# Patient Record
Sex: MALE | Race: WHITE | Employment: PART TIME | URBAN - METROPOLITAN AREA
[De-identification: names, ages, dates, MRNs, and addresses within clinical notes are randomized per-mention and may not be internally consistent; named-entity substitution may affect disease eponyms.]

---

## 2017-01-18 RX ORDER — VENLAFAXINE 75 MG/1
TABLET ORAL
Qty: 90 TABLET | Refills: 3 | Status: SHIPPED | OUTPATIENT
Start: 2017-01-18 | End: 2018-07-07 | Stop reason: SDUPTHER

## 2017-03-20 RX ORDER — ALLOPURINOL 100 MG/1
TABLET ORAL
Qty: 90 TABLET | Refills: 3 | Status: SHIPPED | OUTPATIENT
Start: 2017-03-20 | End: 2018-03-18 | Stop reason: SDUPTHER

## 2017-04-20 ENCOUNTER — OFFICE VISIT (OUTPATIENT)
Dept: INTERNAL MEDICINE CLINIC | Age: 67
End: 2017-04-20

## 2017-04-20 VITALS
BODY MASS INDEX: 28.64 KG/M2 | SYSTOLIC BLOOD PRESSURE: 156 MMHG | DIASTOLIC BLOOD PRESSURE: 70 MMHG | HEART RATE: 64 BPM | WEIGHT: 189 LBS | HEIGHT: 68 IN

## 2017-04-20 DIAGNOSIS — I10 ESSENTIAL HYPERTENSION, BENIGN: Primary | ICD-10-CM

## 2017-04-20 PROCEDURE — 4040F PNEUMOC VAC/ADMIN/RCVD: CPT | Performed by: INTERNAL MEDICINE

## 2017-04-20 PROCEDURE — 1123F ACP DISCUSS/DSCN MKR DOCD: CPT | Performed by: INTERNAL MEDICINE

## 2017-04-20 PROCEDURE — G8428 CUR MEDS NOT DOCUMENT: HCPCS | Performed by: INTERNAL MEDICINE

## 2017-04-20 PROCEDURE — 1036F TOBACCO NON-USER: CPT | Performed by: INTERNAL MEDICINE

## 2017-04-20 PROCEDURE — 99213 OFFICE O/P EST LOW 20 MIN: CPT | Performed by: INTERNAL MEDICINE

## 2017-04-20 PROCEDURE — G8420 CALC BMI NORM PARAMETERS: HCPCS | Performed by: INTERNAL MEDICINE

## 2017-04-20 PROCEDURE — 3017F COLORECTAL CA SCREEN DOC REV: CPT | Performed by: INTERNAL MEDICINE

## 2017-04-20 RX ORDER — ATENOLOL 25 MG/1
25 TABLET ORAL DAILY
Qty: 90 TABLET | Refills: 3 | Status: SHIPPED | OUTPATIENT
Start: 2017-04-20 | End: 2018-04-15 | Stop reason: SDUPTHER

## 2017-04-20 ASSESSMENT — PATIENT HEALTH QUESTIONNAIRE - PHQ9
SUM OF ALL RESPONSES TO PHQ9 QUESTIONS 1 & 2: 0
2. FEELING DOWN, DEPRESSED OR HOPELESS: 0
SUM OF ALL RESPONSES TO PHQ QUESTIONS 1-9: 0
1. LITTLE INTEREST OR PLEASURE IN DOING THINGS: 0

## 2017-05-24 RX ORDER — LISINOPRIL 40 MG/1
TABLET ORAL
Qty: 90 TABLET | Refills: 3 | Status: SHIPPED | OUTPATIENT
Start: 2017-05-24 | End: 2017-08-01 | Stop reason: SDUPTHER

## 2017-07-05 RX ORDER — SIMVASTATIN 40 MG
TABLET ORAL
Qty: 90 TABLET | Refills: 3 | Status: SHIPPED | OUTPATIENT
Start: 2017-07-05 | End: 2017-10-23 | Stop reason: ALTCHOICE

## 2017-08-01 RX ORDER — LISINOPRIL 40 MG/1
40 TABLET ORAL DAILY
Qty: 90 TABLET | Refills: 3 | Status: SHIPPED | OUTPATIENT
Start: 2017-08-01 | End: 2018-02-13 | Stop reason: SDUPTHER

## 2017-09-08 ENCOUNTER — HOSPITAL ENCOUNTER (OUTPATIENT)
Dept: CT IMAGING | Age: 67
Discharge: HOME | End: 2017-09-08
Attending: INTERNAL MEDICINE | Admitting: INTERNAL MEDICINE

## 2017-09-08 DIAGNOSIS — D48.1 NEOPLASM OF UNCERTAIN BEHAVIOR OF CONNECTIVE AND OTHER SOFT TISSUE: ICD-10-CM

## 2017-09-08 LAB
BUN BLDV-MCNC: 17 MG/DL (ref 7–20)
CREAT SERPL-MCNC: 1.2 MG/DL (ref 0.8–1.3)
GFR AFRICAN AMERICAN: >60
GFR NON-AFRICAN AMERICAN: >60

## 2017-10-19 ENCOUNTER — OFFICE VISIT (OUTPATIENT)
Dept: INTERNAL MEDICINE CLINIC | Age: 67
End: 2017-10-19

## 2017-10-19 VITALS
SYSTOLIC BLOOD PRESSURE: 142 MMHG | WEIGHT: 187 LBS | HEIGHT: 69 IN | DIASTOLIC BLOOD PRESSURE: 80 MMHG | BODY MASS INDEX: 27.7 KG/M2 | HEART RATE: 60 BPM

## 2017-10-19 DIAGNOSIS — Z00.00 ROUTINE GENERAL MEDICAL EXAMINATION AT A HEALTH CARE FACILITY: ICD-10-CM

## 2017-10-19 DIAGNOSIS — I10 ESSENTIAL HYPERTENSION, BENIGN: Primary | ICD-10-CM

## 2017-10-19 DIAGNOSIS — Z12.5 PROSTATE CANCER SCREENING: ICD-10-CM

## 2017-10-19 DIAGNOSIS — R73.9 HYPERGLYCEMIA: ICD-10-CM

## 2017-10-19 LAB
A/G RATIO: 1.4 (ref 1.1–2.2)
ALBUMIN SERPL-MCNC: 4.4 G/DL (ref 3.4–5)
ALP BLD-CCNC: 89 U/L (ref 40–129)
ALT SERPL-CCNC: 31 U/L (ref 10–40)
ANION GAP SERPL CALCULATED.3IONS-SCNC: 13 MMOL/L (ref 3–16)
AST SERPL-CCNC: 20 U/L (ref 15–37)
BILIRUB SERPL-MCNC: 0.6 MG/DL (ref 0–1)
BUN BLDV-MCNC: 17 MG/DL (ref 7–20)
CALCIUM SERPL-MCNC: 9.9 MG/DL (ref 8.3–10.6)
CHLORIDE BLD-SCNC: 100 MMOL/L (ref 99–110)
CHOLESTEROL, TOTAL: 189 MG/DL (ref 0–199)
CO2: 30 MMOL/L (ref 21–32)
CREAT SERPL-MCNC: 1.1 MG/DL (ref 0.8–1.3)
GFR AFRICAN AMERICAN: >60
GFR NON-AFRICAN AMERICAN: >60
GLOBULIN: 3.1 G/DL
GLUCOSE BLD-MCNC: 100 MG/DL (ref 70–99)
HDLC SERPL-MCNC: 38 MG/DL (ref 40–60)
LDL CHOLESTEROL CALCULATED: ABNORMAL MG/DL
LDL CHOLESTEROL DIRECT: 76 MG/DL
POTASSIUM SERPL-SCNC: 4.4 MMOL/L (ref 3.5–5.1)
PROSTATE SPECIFIC ANTIGEN: 0.98 NG/ML (ref 0–4)
SODIUM BLD-SCNC: 143 MMOL/L (ref 136–145)
TOTAL PROTEIN: 7.5 G/DL (ref 6.4–8.2)
TRIGL SERPL-MCNC: 513 MG/DL (ref 0–150)
TSH REFLEX FT4: 2.25 UIU/ML (ref 0.27–4.2)
VLDLC SERPL CALC-MCNC: ABNORMAL MG/DL

## 2017-10-19 PROCEDURE — 99215 OFFICE O/P EST HI 40 MIN: CPT | Performed by: INTERNAL MEDICINE

## 2017-10-19 PROCEDURE — 3017F COLORECTAL CA SCREEN DOC REV: CPT | Performed by: INTERNAL MEDICINE

## 2017-10-19 PROCEDURE — G8427 DOCREV CUR MEDS BY ELIG CLIN: HCPCS | Performed by: INTERNAL MEDICINE

## 2017-10-19 PROCEDURE — 1036F TOBACCO NON-USER: CPT | Performed by: INTERNAL MEDICINE

## 2017-10-19 PROCEDURE — G8417 CALC BMI ABV UP PARAM F/U: HCPCS | Performed by: INTERNAL MEDICINE

## 2017-10-19 PROCEDURE — G8484 FLU IMMUNIZE NO ADMIN: HCPCS | Performed by: INTERNAL MEDICINE

## 2017-10-19 PROCEDURE — 4040F PNEUMOC VAC/ADMIN/RCVD: CPT | Performed by: INTERNAL MEDICINE

## 2017-10-19 PROCEDURE — 1123F ACP DISCUSS/DSCN MKR DOCD: CPT | Performed by: INTERNAL MEDICINE

## 2017-10-19 NOTE — PROGRESS NOTES
Chief Complaint   Patient presents with    Annual Exam       Xiang Hunter 78 y.o. male is here for   hypertension, Hyperlipidemia, status post colon resection in for comprehensive physical examination. He recently had a CT scan done in follow-up of the colon resection. He had some changes of fatty liver, prostatic hypertrophy and some thickening of the bladder wall. Examination is otherwise unremarkable. He has no personal history of diabetes but his father was diabetic in his later years. He does not have polydipsia polyuria or polyphagia. The patient denies   symptoms suggesting end organ damage from their hypertension. There has been compliance to medications      Current Outpatient Prescriptions on File Prior to Visit   Medication Sig Dispense Refill    lisinopril (PRINIVIL;ZESTRIL) 40 MG tablet Take 1 tablet by mouth daily 90 tablet 3    simvastatin (ZOCOR) 40 MG tablet TAKE ONE TABLET BY MOUTH EVERY NIGHT AT BEDTIME 90 tablet 3    atenolol (TENORMIN) 25 MG tablet Take 1 tablet by mouth daily 90 tablet 3    allopurinol (ZYLOPRIM) 100 MG tablet TAKE ONE TABLET BY MOUTH DAILY 90 tablet 3    venlafaxine (EFFEXOR) 75 MG tablet TAKE ONE TABLET BY MOUTH DAILY 90 tablet 3    Fish Oil-Cholecalciferol (FISH OIL + D3 PO) Take by mouth daily      aspirin 81 MG EC tablet Take 81 mg by mouth daily. No current facility-administered medications on file prior to visit. Past Medical History:   Diagnosis Date    Hyperlipidemia     Hypertension     Sleep apnea     uses c-pap     Review of systems does have some urinary hesitancy otherwise 10 system review negative      BP (!) 142/80   Pulse 60   Ht 5' 8.5\" (1.74 m)   Wt 187 lb (84.8 kg)   BMI 28.02 kg/m²   Physical Exam   Constitutional: He appears well-developed and well-nourished. HENT:   Head: Normocephalic and atraumatic.    Right Ear: External ear normal.   Left Ear: External ear normal.   Eyes: Conjunctivae are normal. No

## 2017-10-20 LAB
ESTIMATED AVERAGE GLUCOSE: 128.4 MG/DL
HBA1C MFR BLD: 6.1 %

## 2017-10-23 RX ORDER — ATORVASTATIN CALCIUM 40 MG/1
40 TABLET, FILM COATED ORAL DAILY
Qty: 90 TABLET | Refills: 3 | Status: SHIPPED | OUTPATIENT
Start: 2017-10-23 | End: 2018-12-06 | Stop reason: SDUPTHER

## 2018-02-13 DIAGNOSIS — I10 ESSENTIAL HYPERTENSION, BENIGN: Primary | ICD-10-CM

## 2018-02-13 RX ORDER — LISINOPRIL 40 MG/1
40 TABLET ORAL DAILY
Qty: 90 TABLET | Refills: 3 | Status: SHIPPED | OUTPATIENT
Start: 2018-02-13 | End: 2019-02-13 | Stop reason: SDUPTHER

## 2018-02-22 ENCOUNTER — OFFICE VISIT (OUTPATIENT)
Dept: INTERNAL MEDICINE CLINIC | Age: 68
End: 2018-02-22

## 2018-02-22 ENCOUNTER — TELEPHONE (OUTPATIENT)
Dept: INTERNAL MEDICINE CLINIC | Age: 68
End: 2018-02-22

## 2018-02-22 VITALS
HEART RATE: 112 BPM | OXYGEN SATURATION: 98 % | BODY MASS INDEX: 28.77 KG/M2 | DIASTOLIC BLOOD PRESSURE: 90 MMHG | WEIGHT: 192 LBS | SYSTOLIC BLOOD PRESSURE: 170 MMHG

## 2018-02-22 DIAGNOSIS — J10.1 INFLUENZA A: Primary | ICD-10-CM

## 2018-02-22 LAB
INFLUENZA A ANTIBODY: NORMAL
INFLUENZA B ANTIBODY: NORMAL

## 2018-02-22 PROCEDURE — 99213 OFFICE O/P EST LOW 20 MIN: CPT | Performed by: FAMILY MEDICINE

## 2018-02-22 PROCEDURE — G8417 CALC BMI ABV UP PARAM F/U: HCPCS | Performed by: FAMILY MEDICINE

## 2018-02-22 PROCEDURE — 1123F ACP DISCUSS/DSCN MKR DOCD: CPT | Performed by: FAMILY MEDICINE

## 2018-02-22 PROCEDURE — G8427 DOCREV CUR MEDS BY ELIG CLIN: HCPCS | Performed by: FAMILY MEDICINE

## 2018-02-22 PROCEDURE — 4040F PNEUMOC VAC/ADMIN/RCVD: CPT | Performed by: FAMILY MEDICINE

## 2018-02-22 PROCEDURE — 1036F TOBACCO NON-USER: CPT | Performed by: FAMILY MEDICINE

## 2018-02-22 PROCEDURE — G8484 FLU IMMUNIZE NO ADMIN: HCPCS | Performed by: FAMILY MEDICINE

## 2018-02-22 PROCEDURE — 3017F COLORECTAL CA SCREEN DOC REV: CPT | Performed by: FAMILY MEDICINE

## 2018-02-22 PROCEDURE — 87804 INFLUENZA ASSAY W/OPTIC: CPT | Performed by: FAMILY MEDICINE

## 2018-02-22 RX ORDER — OSELTAMIVIR PHOSPHATE 75 MG/1
75 CAPSULE ORAL 2 TIMES DAILY
Qty: 10 CAPSULE | Refills: 0 | Status: SHIPPED | OUTPATIENT
Start: 2018-02-22 | End: 2018-02-27

## 2018-02-22 RX ORDER — BENZONATATE 100 MG/1
100 CAPSULE ORAL EVERY 6 HOURS PRN
Qty: 30 CAPSULE | Refills: 0 | Status: SHIPPED | OUTPATIENT
Start: 2018-02-22 | End: 2018-03-24

## 2018-02-22 ASSESSMENT — ENCOUNTER SYMPTOMS
SHORTNESS OF BREATH: 0
COUGH: 1
TROUBLE SWALLOWING: 0
STRIDOR: 0
EYE ITCHING: 0
FACIAL SWELLING: 0
EYE DISCHARGE: 0
CHEST TIGHTNESS: 0

## 2018-02-22 NOTE — TELEPHONE ENCOUNTER
Pt called and said that he started getting sick on tue this week and has head/chest congestion, cough, body aches and he had a fever of 101 last night    No appt left with demian today or tomorrow    Can he be worked in

## 2018-03-19 RX ORDER — ALLOPURINOL 100 MG/1
TABLET ORAL
Qty: 90 TABLET | Refills: 2 | Status: SHIPPED | OUTPATIENT
Start: 2018-03-19 | End: 2018-12-14 | Stop reason: SDUPTHER

## 2018-04-18 ENCOUNTER — OFFICE VISIT (OUTPATIENT)
Dept: INTERNAL MEDICINE CLINIC | Age: 68
End: 2018-04-18

## 2018-04-18 VITALS
DIASTOLIC BLOOD PRESSURE: 80 MMHG | HEART RATE: 60 BPM | SYSTOLIC BLOOD PRESSURE: 158 MMHG | BODY MASS INDEX: 28.92 KG/M2 | WEIGHT: 193 LBS

## 2018-04-18 DIAGNOSIS — I10 ESSENTIAL HYPERTENSION, BENIGN: Primary | ICD-10-CM

## 2018-04-18 PROCEDURE — 3017F COLORECTAL CA SCREEN DOC REV: CPT | Performed by: INTERNAL MEDICINE

## 2018-04-18 PROCEDURE — G8417 CALC BMI ABV UP PARAM F/U: HCPCS | Performed by: INTERNAL MEDICINE

## 2018-04-18 PROCEDURE — G8427 DOCREV CUR MEDS BY ELIG CLIN: HCPCS | Performed by: INTERNAL MEDICINE

## 2018-04-18 PROCEDURE — 1123F ACP DISCUSS/DSCN MKR DOCD: CPT | Performed by: INTERNAL MEDICINE

## 2018-04-18 PROCEDURE — 99213 OFFICE O/P EST LOW 20 MIN: CPT | Performed by: INTERNAL MEDICINE

## 2018-04-18 PROCEDURE — 4040F PNEUMOC VAC/ADMIN/RCVD: CPT | Performed by: INTERNAL MEDICINE

## 2018-04-18 PROCEDURE — 1036F TOBACCO NON-USER: CPT | Performed by: INTERNAL MEDICINE

## 2018-04-18 RX ORDER — ATENOLOL 50 MG/1
50 TABLET ORAL DAILY
Qty: 90 TABLET | Refills: 3 | Status: SHIPPED | OUTPATIENT
Start: 2018-04-18 | End: 2019-04-11 | Stop reason: SDUPTHER

## 2018-07-09 RX ORDER — VENLAFAXINE 75 MG/1
TABLET ORAL
Qty: 90 TABLET | Refills: 3 | Status: SHIPPED | OUTPATIENT
Start: 2018-07-09 | End: 2019-12-31

## 2018-08-05 PROBLEM — H53.462 HOMONYMOUS HEMIANOPIA, LEFT: Status: ACTIVE | Noted: 2018-08-05

## 2018-08-07 ENCOUNTER — NURSE ONLY (OUTPATIENT)
Dept: CARDIOLOGY CLINIC | Age: 68
End: 2018-08-07

## 2018-08-07 DIAGNOSIS — R00.2 PALPITATIONS: Primary | ICD-10-CM

## 2018-08-07 DIAGNOSIS — R00.2 PALPITATIONS: ICD-10-CM

## 2018-08-09 ENCOUNTER — TELEPHONE (OUTPATIENT)
Dept: INTERNAL MEDICINE CLINIC | Age: 68
End: 2018-08-09

## 2018-08-21 ENCOUNTER — OFFICE VISIT (OUTPATIENT)
Dept: INTERNAL MEDICINE CLINIC | Age: 68
End: 2018-08-21

## 2018-08-21 VITALS
SYSTOLIC BLOOD PRESSURE: 124 MMHG | HEART RATE: 72 BPM | BODY MASS INDEX: 26.88 KG/M2 | WEIGHT: 182 LBS | DIASTOLIC BLOOD PRESSURE: 80 MMHG

## 2018-08-21 DIAGNOSIS — I63.443 CEREBROVASCULAR ACCIDENT (CVA) DUE TO BILATERAL EMBOLISM OF CEREBELLAR ARTERIES (HCC): ICD-10-CM

## 2018-08-21 DIAGNOSIS — R00.0 TACHYCARDIA: Primary | ICD-10-CM

## 2018-08-21 PROCEDURE — G8510 SCR DEP NEG, NO PLAN REQD: HCPCS | Performed by: INTERNAL MEDICINE

## 2018-08-21 PROCEDURE — 99495 TRANSJ CARE MGMT MOD F2F 14D: CPT | Performed by: INTERNAL MEDICINE

## 2018-08-21 PROCEDURE — 93000 ELECTROCARDIOGRAM COMPLETE: CPT | Performed by: INTERNAL MEDICINE

## 2018-08-21 PROCEDURE — 1111F DSCHRG MED/CURRENT MED MERGE: CPT | Performed by: INTERNAL MEDICINE

## 2018-08-21 PROCEDURE — 3288F FALL RISK ASSESSMENT DOCD: CPT | Performed by: INTERNAL MEDICINE

## 2018-08-21 ASSESSMENT — PATIENT HEALTH QUESTIONNAIRE - PHQ9
SUM OF ALL RESPONSES TO PHQ QUESTIONS 1-9: 0
1. LITTLE INTEREST OR PLEASURE IN DOING THINGS: 0
SUM OF ALL RESPONSES TO PHQ9 QUESTIONS 1 & 2: 0
2. FEELING DOWN, DEPRESSED OR HOPELESS: 0
SUM OF ALL RESPONSES TO PHQ QUESTIONS 1-9: 0

## 2018-08-22 NOTE — PROGRESS NOTES
Post-Discharge Transitional Care Management Services or Hospital Follow Up      Huyen Gil   YOB: 1950    Date of Office Visit:  8/21/2018  Date of Hospital Admission: 8/5/18  Date of Hospital Discharge: 8/7/18  Readmission Risk Score(high >=14%.  Medium >=10%):Readmission Risk Score: 9    Care management risk score Rising risk (score 2-5) and Complex Care (Scores >=6): 0     Non face to face  following discharge, date last encounter closed (first attempt may have been earlier): 8/9/2018  4:51 PM 8/9/2018  4:51 PM    Call initiated 2 business days of discharge: Yes     Patient Active Problem List   Diagnosis    Essential hypertension, benign    Colonic mass;  benign    Homonymous hemianopia, left    Cerebrovascular accident (CVA) due to embolism of right vertebral artery ([address]    Cerebral infarction due to embolism of right posterior cerebral artery ([address]       No Known Allergies    Medications listed as ordered at the time of discharge from [facility] Medication Instructions FRANCES:    Printed on:08/22/18 0801   Medication Information                      allopurinol (ZYLOPRIM) 100 MG tablet  TAKE ONE TABLET BY MOUTH DAILY             aspirin 81 MG chewable tablet  Take 1 tablet by mouth daily             atenolol (TENORMIN) 50 MG tablet  Take 1 tablet by mouth daily             atorvastatin (LIPITOR) 40 MG tablet  Take 1 tablet by mouth daily             clopidogrel (PLAVIX) 75 MG tablet  Take 1 tablet by mouth daily             Fish Oil-Cholecalciferol (FISH OIL + D3 PO)  Take by mouth daily             lisinopril (PRINIVIL;ZESTRIL) 40 MG tablet  Take 1 tablet by mouth daily             venlafaxine (EFFEXOR) 75 MG tablet  TAKE ONE TABLET BY MOUTH DAILY                   Medications marked \"taking\" at this time  No outpatient prescriptions have been marked as taking for the 8/21/18 encounter (Office Visit) with Scott Gillespie MD.        Medications patient taking as of now reconciled against medications ordered at time of hospital discharge: Yes    Chief Complaint   Patient presents with    Follow-Up from Hospital     CVA       HPI    Inpatient course: Discharge summary reviewed- see chart. Interval history/Current status:  Slow improvement since dismissal from the hospital    The patient was admitted to the hospitalist cerebellar CVA due to embolic disease of the vertebral arteries. Review the results of hospitalization in detail. Transesophageal echo was negative for intramural thrombus or ASD. He had no significant carotid stenosis. Atrial fibrillation was not documented during the course of his hospitalization although the patient states he has had some irregularities in his heart rhythm and a indwelling monitor is in place for possible atrial fibrillation. Review of Systems    Vitals:    08/21/18 1629   BP: 124/80   Pulse: 72   Weight: 182 lb (82.6 kg)     Body mass index is 26.88 kg/m². Wt Readings from Last 3 Encounters:   08/21/18 182 lb (82.6 kg)   08/07/18 192 lb 8 oz (87.3 kg)   04/18/18 193 lb (87.5 kg)     BP Readings from Last 3 Encounters:   08/21/18 124/80   08/07/18 (!) 169/92   04/18/18 (!) 158/80       Physical Exam  Rhythm was initially noted to be irregular  Lungs clear  Cardiac exam normal  Neurological examination he is alert and oriented, cranial nerves II through XII are intact, documented left-sided visual field defect        Assessment/Plan:  1. Tachycardia  Normal sinus rhythm  - EKG 12 Lead    2. Cerebrovascular accident (CVA) due to bilateral embolism of cerebellar arteries ([address]  Because of the noted arrhythmia at the beginning of the visit we performed an EKG, he continues to be in sinus rhythm    We discussed at length activity level diet, exercise, current medical regimen with both the patient and his wife was in attendance during the visit.     At this point in time it appears antiplatelet agents are most appropriate. I did discuss with him. Anticoagulation if his monitor shows evidence of atrial fibrillation.   He currently is in sinus rhythm  - ID DISCHARGE MEDS RECONCILED W/ CURRENT OUTPATIENT MED LIST        Medical Decision Making: high complexity

## 2018-08-28 ENCOUNTER — TELEPHONE (OUTPATIENT)
Dept: CARDIOLOGY CLINIC | Age: 68
End: 2018-08-28

## 2018-08-29 ENCOUNTER — OFFICE VISIT (OUTPATIENT)
Dept: CARDIOLOGY CLINIC | Age: 68
End: 2018-08-29

## 2018-08-29 VITALS
SYSTOLIC BLOOD PRESSURE: 172 MMHG | HEIGHT: 69 IN | OXYGEN SATURATION: 99 % | BODY MASS INDEX: 27.46 KG/M2 | WEIGHT: 185.4 LBS | DIASTOLIC BLOOD PRESSURE: 92 MMHG | HEART RATE: 55 BPM

## 2018-08-29 DIAGNOSIS — I48.0 PAROXYSMAL ATRIAL FIBRILLATION (HCC): ICD-10-CM

## 2018-08-29 DIAGNOSIS — I63.111 CEREBROVASCULAR ACCIDENT (CVA) DUE TO EMBOLISM OF RIGHT VERTEBRAL ARTERY (HCC): Primary | ICD-10-CM

## 2018-08-29 DIAGNOSIS — I10 ESSENTIAL HYPERTENSION, BENIGN: ICD-10-CM

## 2018-08-29 PROCEDURE — 99214 OFFICE O/P EST MOD 30 MIN: CPT | Performed by: NURSE PRACTITIONER

## 2018-08-29 PROCEDURE — 3017F COLORECTAL CA SCREEN DOC REV: CPT | Performed by: NURSE PRACTITIONER

## 2018-08-29 PROCEDURE — 1123F ACP DISCUSS/DSCN MKR DOCD: CPT | Performed by: NURSE PRACTITIONER

## 2018-08-29 PROCEDURE — 4040F PNEUMOC VAC/ADMIN/RCVD: CPT | Performed by: NURSE PRACTITIONER

## 2018-08-29 PROCEDURE — 1111F DSCHRG MED/CURRENT MED MERGE: CPT | Performed by: NURSE PRACTITIONER

## 2018-08-29 PROCEDURE — G8598 ASA/ANTIPLAT THER USED: HCPCS | Performed by: NURSE PRACTITIONER

## 2018-08-29 PROCEDURE — 1101F PT FALLS ASSESS-DOCD LE1/YR: CPT | Performed by: NURSE PRACTITIONER

## 2018-08-29 PROCEDURE — 1036F TOBACCO NON-USER: CPT | Performed by: NURSE PRACTITIONER

## 2018-08-29 PROCEDURE — G8427 DOCREV CUR MEDS BY ELIG CLIN: HCPCS | Performed by: NURSE PRACTITIONER

## 2018-08-29 PROCEDURE — G8417 CALC BMI ABV UP PARAM F/U: HCPCS | Performed by: NURSE PRACTITIONER

## 2018-08-29 PROCEDURE — 93000 ELECTROCARDIOGRAM COMPLETE: CPT | Performed by: NURSE PRACTITIONER

## 2018-08-29 NOTE — PROGRESS NOTES
[facility]     Outpatient Follow Up Note    CHIEF COMPLAINT / HPI: Hospital Follow Up secondary to atrial fibrillation/ CVA/ Hypertension/ and hyperlipidemia       Clara Haines is 80 y.o. male who presents today for a routine follow up related to the above mentioned issues. Subjective:   Since the time of discharge, the patient admits their symptoms have improved. Today patient is being seen due to atrial fibrillation. Patient was admitted to [facility] on 8/5/18  due to acute  CVA. He underwent a BRI results negative findings. He was discharge from the hospital with an event monitor, on 8/25/18 patient had an episode of atrial fibrillation . Patient was asymptomatic. Today's EKG sinus bradycardia 50's. He denies any chest pain, palpitations, SOB, dizziness, or edema. His wife is at today's OV. With regard to medication therapy the patient has been compliant with prescribed regimen. They have tolerated therapy to date.      Past Medical History:   Diagnosis Date    Hyperlipidemia     Hypertension     Sleep apnea     uses c-pap     Social History:    History   Smoking Status    Never Smoker   Smokeless Tobacco    Never Used     Current Medications:  Current Outpatient Prescriptions   Medication Sig Dispense Refill    aspirin 81 MG chewable tablet Take 1 tablet by mouth daily 30 tablet 3    clopidogrel (PLAVIX) 75 MG tablet Take 1 tablet by mouth daily 30 tablet 3    venlafaxine (EFFEXOR) 75 MG tablet TAKE ONE TABLET BY MOUTH DAILY 90 tablet 3    atenolol (TENORMIN) 50 MG tablet Take 1 tablet by mouth daily 90 tablet 3    allopurinol (ZYLOPRIM) 100 MG tablet TAKE ONE TABLET BY MOUTH DAILY 90 tablet 2    lisinopril (PRINIVIL;ZESTRIL) 40 MG tablet Take 1 tablet by mouth daily 90 tablet 3    atorvastatin (LIPITOR) 40 MG tablet Take 1 tablet by mouth daily 90 tablet 3    Fish Oil-Cholecalciferol (FISH OIL + D3 PO) Take by mouth daily       No current facility-administered medications for this visit. REVIEW OF SYSTEMS:   CONSTITUTIONAL: No major weight gain or loss, fatigue, weakness, night sweats or fever. There's been no change in energy level, sleep pattern, or activity level. HEENT: No new vision difficulties or ringing in the ears. RESPIRATORY: No new SOB, PND, orthopnea or cough. CARDIOVASCULAR: See HPI  GI: No nausea, vomiting, diarrhea, constipation, abdominal pain or changes in bowel habits. : No urinary frequency, urgency, incontinence hematuria or dysuria. SKIN: No cyanosis or skin lesions. MUSCULOSKELETAL: No new muscle or joint pain. NEUROLOGICAL: No syncope or TIA-like symptoms. PSYCHIATRIC: No anxiety, pain, insomnia or depression    Objective:   PHYSICAL EXAM:        VITALS:    Vitals:    08/29/18 1046   BP: (!) 172/92   Pulse:    SpO2:          CONSTITUTIONAL: Cooperative, no apparent distress, and appears well nourished / developed  NEUROLOGIC:  Awake and orientated to person, place and time. PSYCH: Calm affect. SKIN: Warm and dry. HEENT: Sclera non-icteric, normocephalic, neck supple, no elevation of JVP, normal carotid pulses with no bruits and thyroid normal size. LUNGS:  No increased work of breathing and clear to auscultation, no crackles or wheezing. CARDIOVASCULAR:  Regular rate and rhythm with no murmurs, gallops, rubs, or abnormal heart sounds, normal PMI. The apical impulses not displaced. Heart tones are crisp and normal                                                       Cervical veins are not engorged                 JVP less than 8 cm H2O                                                                              The carotid upstroke is normal in amplitude and contour without delay or bruit    ABDOMEN:  Normal bowel sounds, non-distended and non-tender to palpation   EXT: No edema, no calf tenderness. Pulses are present bilaterally.     DATA:    Lab Results   Component Value Date    ALT 27 08/05/2018 prescribed for this patient. Education conducted on adverse reactions including bleeding was discussed. The patient verbalizes understanding. Pt is on a BB  Pt is on an ace-i/ARB  Pt is on a statin    Saturated fat diet discussed  Exercise program discussed    Thank you for allowing to us to participate in the care of Jaclyn Steele CNP

## 2018-09-10 ENCOUNTER — TELEPHONE (OUTPATIENT)
Dept: CARDIOLOGY CLINIC | Age: 68
End: 2018-09-10

## 2018-09-10 PROCEDURE — 93228 REMOTE 30 DAY ECG REV/REPORT: CPT | Performed by: INTERNAL MEDICINE

## 2018-09-10 NOTE — TELEPHONE ENCOUNTER
Pt calling to speak with npkk about blood pressure since taking the medication rivaroxaban (XARELTO) 20 MG TABS tablet. Please call to advise,Thank You!

## 2018-09-13 ENCOUNTER — TELEPHONE (OUTPATIENT)
Dept: CARDIOLOGY CLINIC | Age: 68
End: 2018-09-13

## 2018-09-17 ENCOUNTER — TELEPHONE (OUTPATIENT)
Dept: CARDIOLOGY CLINIC | Age: 68
End: 2018-09-17

## 2018-09-26 PROBLEM — I49.5 TACHY-BRADY SYNDROME (HCC): Status: ACTIVE | Noted: 2018-09-26

## 2018-09-26 NOTE — PROGRESS NOTES
regurgitation with a estimated RVSP of 43 mmHg.   -Moderate pulmonic regurgitation present.   -The right ventricle is borderline enlarged.   -A bubble study was performed and fails to show evidence of shunting.   -IVC size is normal (<2.1 cm) but collapses < 50% with respiration.   -Normal diastolic function. BRI:  Indication:  CVA     Findings:      Normal LV function  No cardiac source of emboli  Negative bubble study  Mild-moderate MR and AI  Cath: N/A    Medication:  Current Outpatient Prescriptions   Medication Sig Dispense Refill    rivaroxaban (XARELTO) 20 MG TABS tablet Take 1 tablet by mouth daily (with breakfast) 30 tablet 11    aspirin 81 MG chewable tablet Take 1 tablet by mouth daily 30 tablet 3    venlafaxine (EFFEXOR) 75 MG tablet TAKE ONE TABLET BY MOUTH DAILY (Patient taking differently: TAKE ONE  HALF TABLET BY MOUTH DAILY) 90 tablet 3    atenolol (TENORMIN) 50 MG tablet Take 1 tablet by mouth daily 90 tablet 3    allopurinol (ZYLOPRIM) 100 MG tablet TAKE ONE TABLET BY MOUTH DAILY 90 tablet 2    lisinopril (PRINIVIL;ZESTRIL) 40 MG tablet Take 1 tablet by mouth daily 90 tablet 3    atorvastatin (LIPITOR) 40 MG tablet Take 1 tablet by mouth daily 90 tablet 3    Fish Oil-Cholecalciferol (FISH OIL + D3 PO) Take by mouth daily       No current facility-administered medications for this visit. Assessment and plan:     - Symptomatic paroxysmal atrial fibrillation with antiarrhythmic therapy failure. I have discussed treatment options including antiarrhythmics, rate control with anticoagulation and pacemaker implantation, and ablation in detail with patient. Risks, benefits and alternatives were explained to patient. He is bradycardic at baseline and adding antiarrhythmic therapy will make him more bradycardic. Atrial fibrillation ablation procedure has been discussed in detail with patient.   Risk, benefit, alternative, and rationale of the procedure has been discussed with the

## 2018-10-01 ENCOUNTER — OFFICE VISIT (OUTPATIENT)
Dept: CARDIOLOGY CLINIC | Age: 68
End: 2018-10-01
Payer: MEDICARE

## 2018-10-01 VITALS
DIASTOLIC BLOOD PRESSURE: 82 MMHG | HEART RATE: 57 BPM | WEIGHT: 182.12 LBS | BODY MASS INDEX: 26.97 KG/M2 | SYSTOLIC BLOOD PRESSURE: 138 MMHG | HEIGHT: 69 IN

## 2018-10-01 DIAGNOSIS — I63.111 CEREBROVASCULAR ACCIDENT (CVA) DUE TO EMBOLISM OF RIGHT VERTEBRAL ARTERY (HCC): ICD-10-CM

## 2018-10-01 DIAGNOSIS — I49.5 TACHY-BRADY SYNDROME (HCC): Primary | ICD-10-CM

## 2018-10-01 DIAGNOSIS — I48.0 PAROXYSMAL ATRIAL FIBRILLATION (HCC): ICD-10-CM

## 2018-10-01 DIAGNOSIS — I10 ESSENTIAL HYPERTENSION, BENIGN: ICD-10-CM

## 2018-10-01 DIAGNOSIS — R94.31 ABNORMAL EKG: ICD-10-CM

## 2018-10-01 PROCEDURE — G8427 DOCREV CUR MEDS BY ELIG CLIN: HCPCS | Performed by: INTERNAL MEDICINE

## 2018-10-01 PROCEDURE — G8417 CALC BMI ABV UP PARAM F/U: HCPCS | Performed by: INTERNAL MEDICINE

## 2018-10-01 PROCEDURE — 93000 ELECTROCARDIOGRAM COMPLETE: CPT | Performed by: INTERNAL MEDICINE

## 2018-10-01 PROCEDURE — G8484 FLU IMMUNIZE NO ADMIN: HCPCS | Performed by: INTERNAL MEDICINE

## 2018-10-01 PROCEDURE — G8598 ASA/ANTIPLAT THER USED: HCPCS | Performed by: INTERNAL MEDICINE

## 2018-10-01 PROCEDURE — 1101F PT FALLS ASSESS-DOCD LE1/YR: CPT | Performed by: INTERNAL MEDICINE

## 2018-10-01 PROCEDURE — 1123F ACP DISCUSS/DSCN MKR DOCD: CPT | Performed by: INTERNAL MEDICINE

## 2018-10-01 PROCEDURE — 3017F COLORECTAL CA SCREEN DOC REV: CPT | Performed by: INTERNAL MEDICINE

## 2018-10-01 PROCEDURE — 99204 OFFICE O/P NEW MOD 45 MIN: CPT | Performed by: INTERNAL MEDICINE

## 2018-10-01 PROCEDURE — 4040F PNEUMOC VAC/ADMIN/RCVD: CPT | Performed by: INTERNAL MEDICINE

## 2018-10-01 PROCEDURE — 1036F TOBACCO NON-USER: CPT | Performed by: INTERNAL MEDICINE

## 2018-10-01 NOTE — COMMUNICATION BODY
[address]   Electrophysiology Consultation   Date: 10/1/2018  Reason for Consultation: Atrial fibrillation   Consult Requesting Physician: Katherine Cortez MD    CC: Atrial fibrillation   HPI: Angie Kate is a 80 y.o. male being seen today as a new patient for atrial fibrillation and tachy delroy syndrome referred by Dr Byron Hernandez. He has a past medical history of CVA, paroxysmal atrial fibrillation, hypertension, hyperlipidemia, and sleep apnea (treated with cpap). [place] [facility] [place] with acute CVA. BRI was normal, negative bubble study. He was discharged home with an [facility]. MCOT revealed many episodes of atrial fib with several low and high heart rates. The lowest heart rate being 29 and highest being 196. Patient did not report symptoms. He continued Atenolol and Aspirin. Xarelto was started and Plavix discontinued. He reports having feelings of a rapid heart rate for a few months prior to his hospitalization. He states that when he wore the mcot he did not feel his heart race at all. He is physically active and walks on occasion. He is a nonsmoker but uses alcohol occasionally. His father has history of CAD with CABG and carotid endarterectomy. He is planning a knee surgery in the near future. Past Medical History:   Diagnosis Date    Hyperlipidemia     Hypertension     Sleep apnea     uses c-pap        Past Surgical History:   Procedure Laterality Date    COLONOSCOPY  11/09    HEMICOLECTOMY  10/21/2016     OPEN RIGHT HEMICOLECTOMY                  TONSILLECTOMY         Allergies:  No Known Allergies    Social History:   reports that he has never smoked. He has never used smokeless tobacco. He reports that he drinks alcohol. He reports that he does not use drugs. Family History:  family history includes Coronary Art Dis in his brother; High Blood Pressure in his father and mother. Review of System:  All other systems reviewed except for that noted above. regurgitation with a estimated RVSP of 43 mmHg.   -Moderate pulmonic regurgitation present.   -The right ventricle is borderline enlarged.   -A bubble study was performed and fails to show evidence of shunting.   -IVC size is normal (<2.1 cm) but collapses < 50% with respiration.   -Normal diastolic function. BRI:  Indication:  CVA     Findings:      Normal LV function  No cardiac source of emboli  Negative bubble study  Mild-moderate MR and AI  Cath: N/A    Medication:  Current Outpatient Prescriptions   Medication Sig Dispense Refill    rivaroxaban (XARELTO) 20 MG TABS tablet Take 1 tablet by mouth daily (with breakfast) 30 tablet 11    aspirin 81 MG chewable tablet Take 1 tablet by mouth daily 30 tablet 3    venlafaxine (EFFEXOR) 75 MG tablet TAKE ONE TABLET BY MOUTH DAILY (Patient taking differently: TAKE ONE  HALF TABLET BY MOUTH DAILY) 90 tablet 3    atenolol (TENORMIN) 50 MG tablet Take 1 tablet by mouth daily 90 tablet 3    allopurinol (ZYLOPRIM) 100 MG tablet TAKE ONE TABLET BY MOUTH DAILY 90 tablet 2    lisinopril (PRINIVIL;ZESTRIL) 40 MG tablet Take 1 tablet by mouth daily 90 tablet 3    atorvastatin (LIPITOR) 40 MG tablet Take 1 tablet by mouth daily 90 tablet 3    Fish Oil-Cholecalciferol (FISH OIL + D3 PO) Take by mouth daily       No current facility-administered medications for this visit. Assessment and plan:     - Symptomatic paroxysmal atrial fibrillation with antiarrhythmic therapy failure. I have discussed treatment options including antiarrhythmics, rate control with anticoagulation and pacemaker implantation, and ablation in detail with patient. Risks, benefits and alternatives were explained to patient. He is bradycardic at baseline and adding antiarrhythmic therapy will make him more bradycardic. Atrial fibrillation ablation procedure has been discussed in detail with patient.   Risk, benefit, alternative, and rationale of the procedure has been discussed with the

## 2018-10-01 NOTE — LETTER
[address] [address] [address] [address]  Phone: [phone]  Fax: [phone]    Olive Navas MD        October 1, 2018     Jenaro Babin, [address] [place] [address]    Patient: Mercedes Ashraf  MR Number: [id number]  YOB: 1950  Date of Visit: 10/1/2018    Dear Dr. Jenaro Babin:      [address]   Electrophysiology Consultation   Date: 10/1/2018  Reason for Consultation: Atrial fibrillation   Consult Requesting Physician: Jenaro Babin MD    CC: Atrial fibrillation   HPI: Mercedes Ashraf is a 80 y.o. male being seen today as a new patient for atrial fibrillation and tachy delroy syndrome referred by Dr Joni Schmitz. He has a past medical history of CVA, paroxysmal atrial fibrillation, hypertension, hyperlipidemia, and sleep apnea (treated with cpap). He was admitted to [facility] 8/5/18 with acute CVA. BRI was normal, negative bubble study. He was discharged home with an [facility]. MCOT revealed many episodes of atrial fib with several low and high heart rates. The lowest heart rate being 29 and highest being 196. Patient did not report symptoms. He continued Atenolol and Aspirin. Xarelto was started and Plavix discontinued. He reports having feelings of a rapid heart rate for a few months prior to his hospitalization. He states that when he wore the mcot he did not feel his heart race at all. He is physically active and walks on occasion. He is a nonsmoker but uses alcohol occasionally. His father has history of CAD with CABG and carotid endarterectomy. He is planning a knee surgery in the near future.       Past Medical History:   Diagnosis Date    Hyperlipidemia     Hypertension     Sleep apnea     uses c-pap        Past Surgical History:   Procedure Laterality Date    COLONOSCOPY  11/09    HEMICOLECTOMY  10/21/2016     OPEN RIGHT HEMICOLECTOMY                  TONSILLECTOMY         Allergies:

## 2018-10-02 ENCOUNTER — HOSPITAL ENCOUNTER (OUTPATIENT)
Dept: NON INVASIVE DIAGNOSTICS | Age: 68
Discharge: HOME | End: 2018-10-02
Payer: MEDICARE

## 2018-10-02 DIAGNOSIS — I48.0 PAROXYSMAL ATRIAL FIBRILLATION (HCC): ICD-10-CM

## 2018-10-02 DIAGNOSIS — I48.19 PERSISTENT ATRIAL FIBRILLATION (HCC): Primary | ICD-10-CM

## 2018-10-02 DIAGNOSIS — R94.31 ABNORMAL EKG: ICD-10-CM

## 2018-10-02 DIAGNOSIS — I49.5 TACHY-BRADY SYNDROME (HCC): ICD-10-CM

## 2018-10-02 LAB
LV EF: 67 %
LVEF MODALITY: NORMAL

## 2018-10-02 PROCEDURE — A9502 TC99M TETROFOSMIN: HCPCS | Performed by: INTERNAL MEDICINE

## 2018-10-02 PROCEDURE — 93017 CV STRESS TEST TRACING ONLY: CPT | Performed by: INTERNAL MEDICINE

## 2018-10-02 PROCEDURE — 3430000000 HC RX DIAGNOSTIC RADIOPHARMACEUTICAL: Performed by: INTERNAL MEDICINE

## 2018-10-02 PROCEDURE — 78452 HT MUSCLE IMAGE SPECT MULT: CPT

## 2018-10-02 RX ADMIN — TETROFOSMIN 10 MILLICURIE: 0.23 INJECTION, POWDER, LYOPHILIZED, FOR SOLUTION INTRAVENOUS at 13:55

## 2018-10-02 RX ADMIN — TETROFOSMIN 30 MILLICURIE: 0.23 INJECTION, POWDER, LYOPHILIZED, FOR SOLUTION INTRAVENOUS at 15:19

## 2018-10-04 ENCOUNTER — OFFICE VISIT (OUTPATIENT)
Dept: INTERNAL MEDICINE CLINIC | Age: 68
End: 2018-10-04
Payer: MEDICARE

## 2018-10-04 VITALS
DIASTOLIC BLOOD PRESSURE: 80 MMHG | WEIGHT: 184 LBS | BODY MASS INDEX: 27.17 KG/M2 | SYSTOLIC BLOOD PRESSURE: 120 MMHG | HEART RATE: 60 BPM

## 2018-10-04 DIAGNOSIS — I10 ESSENTIAL HYPERTENSION: Primary | ICD-10-CM

## 2018-10-04 DIAGNOSIS — Z23 NEED FOR PROPHYLACTIC VACCINATION AGAINST STREPTOCOCCUS PNEUMONIAE (PNEUMOCOCCUS) AND INFLUENZA: ICD-10-CM

## 2018-10-04 DIAGNOSIS — Z23 NEED FOR INFLUENZA VACCINATION: ICD-10-CM

## 2018-10-04 PROCEDURE — G8598 ASA/ANTIPLAT THER USED: HCPCS | Performed by: INTERNAL MEDICINE

## 2018-10-04 PROCEDURE — 99213 OFFICE O/P EST LOW 20 MIN: CPT | Performed by: INTERNAL MEDICINE

## 2018-10-04 PROCEDURE — 90732 PPSV23 VACC 2 YRS+ SUBQ/IM: CPT | Performed by: INTERNAL MEDICINE

## 2018-10-04 PROCEDURE — 1101F PT FALLS ASSESS-DOCD LE1/YR: CPT | Performed by: INTERNAL MEDICINE

## 2018-10-04 PROCEDURE — G0009 ADMIN PNEUMOCOCCAL VACCINE: HCPCS | Performed by: INTERNAL MEDICINE

## 2018-10-04 PROCEDURE — G0008 ADMIN INFLUENZA VIRUS VAC: HCPCS | Performed by: INTERNAL MEDICINE

## 2018-10-04 PROCEDURE — G8427 DOCREV CUR MEDS BY ELIG CLIN: HCPCS | Performed by: INTERNAL MEDICINE

## 2018-10-04 PROCEDURE — G8417 CALC BMI ABV UP PARAM F/U: HCPCS | Performed by: INTERNAL MEDICINE

## 2018-10-04 PROCEDURE — 1123F ACP DISCUSS/DSCN MKR DOCD: CPT | Performed by: INTERNAL MEDICINE

## 2018-10-04 PROCEDURE — 3017F COLORECTAL CA SCREEN DOC REV: CPT | Performed by: INTERNAL MEDICINE

## 2018-10-04 PROCEDURE — 90662 IIV NO PRSV INCREASED AG IM: CPT | Performed by: INTERNAL MEDICINE

## 2018-10-04 PROCEDURE — G8482 FLU IMMUNIZE ORDER/ADMIN: HCPCS | Performed by: INTERNAL MEDICINE

## 2018-10-04 PROCEDURE — 4040F PNEUMOC VAC/ADMIN/RCVD: CPT | Performed by: INTERNAL MEDICINE

## 2018-10-04 PROCEDURE — 1036F TOBACCO NON-USER: CPT | Performed by: INTERNAL MEDICINE

## 2018-10-05 NOTE — PROGRESS NOTES
Chief Complaint   Patient presents with    Hypertension    Other     CVA       Fara Corral 80 y.o. male is here for follow-up of hypertension. He is making a good recovery from his stroke. He continues having one quadrant visual field loss but has been given permission for driving. Review of his record shows that his monitor has shown recurrent episodes of atrial fibrillation. His Plavix was discontinued and Xarelto has been started. No new focal neurological symptoms are recorded    He is scheduled for a transesophageal echo, CT angiography of his coronaries and ablation later this year     Current Outpatient Prescriptions on File Prior to Visit   Medication Sig Dispense Refill    rivaroxaban (XARELTO) 20 MG TABS tablet Take 1 tablet by mouth daily (with breakfast) 30 tablet 11    aspirin 81 MG chewable tablet Take 1 tablet by mouth daily 30 tablet 3    venlafaxine (EFFEXOR) 75 MG tablet TAKE ONE TABLET BY MOUTH DAILY (Patient taking differently: TAKE ONE  HALF TABLET BY MOUTH DAILY) 90 tablet 3    atenolol (TENORMIN) 50 MG tablet Take 1 tablet by mouth daily 90 tablet 3    allopurinol (ZYLOPRIM) 100 MG tablet TAKE ONE TABLET BY MOUTH DAILY 90 tablet 2    lisinopril (PRINIVIL;ZESTRIL) 40 MG tablet Take 1 tablet by mouth daily 90 tablet 3    atorvastatin (LIPITOR) 40 MG tablet Take 1 tablet by mouth daily 90 tablet 3    Fish Oil-Cholecalciferol (FISH OIL + D3 PO) Take by mouth daily       No current facility-administered medications on file prior to visit.         Past Medical History:   Diagnosis Date    Hyperlipidemia     Hypertension     Sleep apnea     uses c-pap           /80   Pulse 60   Wt 184 lb (83.5 kg)   BMI 27.17 kg/m²     General Appearance:  Alert, cooperative, no distress, appears stated age   Head:  Normocephalic, without obvious abnormality, atraumatic   Neck: Supple, symmetrical, trachea midline, no adenopathy, thyroid: not enlarged, symmetric, no

## 2018-11-20 ENCOUNTER — HOSPITAL ENCOUNTER (OUTPATIENT)
Age: 68
Discharge: HOME | End: 2018-11-20
Payer: MEDICARE

## 2018-11-20 DIAGNOSIS — I48.19 PERSISTENT ATRIAL FIBRILLATION (HCC): ICD-10-CM

## 2018-11-20 LAB
ANION GAP SERPL CALCULATED.3IONS-SCNC: 11 MMOL/L (ref 3–16)
BUN BLDV-MCNC: 14 MG/DL (ref 7–20)
CALCIUM SERPL-MCNC: 9.4 MG/DL (ref 8.3–10.6)
CHLORIDE BLD-SCNC: 104 MMOL/L (ref 99–110)
CO2: 28 MMOL/L (ref 21–32)
CREAT SERPL-MCNC: 1 MG/DL (ref 0.8–1.3)
GFR AFRICAN AMERICAN: >60
GFR NON-AFRICAN AMERICAN: >60
GLUCOSE BLD-MCNC: 96 MG/DL (ref 70–99)
POTASSIUM SERPL-SCNC: 4.5 MMOL/L (ref 3.5–5.1)
SODIUM BLD-SCNC: 143 MMOL/L (ref 136–145)

## 2018-11-20 PROCEDURE — 80048 BASIC METABOLIC PNL TOTAL CA: CPT

## 2018-11-20 PROCEDURE — 36415 COLL VENOUS BLD VENIPUNCTURE: CPT

## 2018-11-27 ENCOUNTER — HOSPITAL ENCOUNTER (OUTPATIENT)
Dept: CT IMAGING | Age: 68
Discharge: HOME | End: 2018-11-27
Payer: MEDICARE

## 2018-11-27 DIAGNOSIS — I48.19 PERSISTENT ATRIAL FIBRILLATION (HCC): ICD-10-CM

## 2018-11-27 PROCEDURE — 71275 CT ANGIOGRAPHY CHEST: CPT

## 2018-11-27 PROCEDURE — 6360000004 HC RX CONTRAST MEDICATION: Performed by: INTERNAL MEDICINE

## 2018-11-27 RX ADMIN — IOPAMIDOL 75 ML: 755 INJECTION, SOLUTION INTRAVENOUS at 08:48

## 2018-12-06 RX ORDER — ATORVASTATIN CALCIUM 40 MG/1
TABLET, FILM COATED ORAL
Qty: 90 TABLET | Refills: 3 | Status: SHIPPED | OUTPATIENT
Start: 2018-12-06 | End: 2019-12-09 | Stop reason: SDUPTHER

## 2018-12-10 ENCOUNTER — TELEPHONE (OUTPATIENT)
Dept: CARDIOLOGY CLINIC | Age: 68
End: 2018-12-10

## 2018-12-10 NOTE — TELEPHONE ENCOUNTER
Pt calling is [facility] for an Ablation on 12/12/18 with ELAINE and on his paper it says to \"stop taking Xarelto __ days prior\" pt needs to know how many days prior? Also what about the ASA? Pt hasn't taking any today last one taken was yesterday.  Pls call to advise Thank you

## 2018-12-12 ENCOUNTER — ANESTHESIA (OUTPATIENT)
Dept: CARDIAC CATH/INVASIVE PROCEDURES | Age: 68
End: 2018-12-12
Payer: MEDICARE

## 2018-12-12 ENCOUNTER — ANESTHESIA EVENT (OUTPATIENT)
Dept: CARDIAC CATH/INVASIVE PROCEDURES | Age: 68
End: 2018-12-12
Payer: MEDICARE

## 2018-12-12 ENCOUNTER — HOSPITAL ENCOUNTER (OUTPATIENT)
Dept: CARDIAC CATH/INVASIVE PROCEDURES | Age: 68
Discharge: HOME HEALTH | End: 2018-12-13
Attending: INTERNAL MEDICINE | Admitting: INTERNAL MEDICINE
Payer: MEDICARE

## 2018-12-12 VITALS
TEMPERATURE: 97.7 F | RESPIRATION RATE: 1 BRPM | DIASTOLIC BLOOD PRESSURE: 71 MMHG | SYSTOLIC BLOOD PRESSURE: 168 MMHG | OXYGEN SATURATION: 92 %

## 2018-12-12 PROBLEM — I48.0 PAF (PAROXYSMAL ATRIAL FIBRILLATION) (HCC): Status: ACTIVE | Noted: 2018-12-12

## 2018-12-12 LAB
A/G RATIO: 1.2 (ref 1.1–2.2)
ABO/RH: NORMAL
ALBUMIN SERPL-MCNC: 4.1 G/DL (ref 3.4–5)
ALP BLD-CCNC: 98 U/L (ref 40–129)
ALT SERPL-CCNC: 22 U/L (ref 10–40)
ANION GAP SERPL CALCULATED.3IONS-SCNC: 10 MMOL/L (ref 3–16)
ANTIBODY SCREEN: NORMAL
AST SERPL-CCNC: 17 U/L (ref 15–37)
BILIRUB SERPL-MCNC: 0.5 MG/DL (ref 0–1)
BUN BLDV-MCNC: 17 MG/DL (ref 7–20)
CALCIUM SERPL-MCNC: 9.4 MG/DL (ref 8.3–10.6)
CHLORIDE BLD-SCNC: 105 MMOL/L (ref 99–110)
CO2: 26 MMOL/L (ref 21–32)
CREAT SERPL-MCNC: 1 MG/DL (ref 0.8–1.3)
EKG ATRIAL RATE: 53 BPM
EKG DIAGNOSIS: NORMAL
EKG P AXIS: 61 DEGREES
EKG P-R INTERVAL: 194 MS
EKG Q-T INTERVAL: 436 MS
EKG QRS DURATION: 80 MS
EKG QTC CALCULATION (BAZETT): 409 MS
EKG R AXIS: 54 DEGREES
EKG T AXIS: 25 DEGREES
EKG VENTRICULAR RATE: 53 BPM
GFR AFRICAN AMERICAN: >60
GFR NON-AFRICAN AMERICAN: >60
GLOBULIN: 3.4 G/DL
GLUCOSE BLD-MCNC: 100 MG/DL (ref 70–99)
HCT VFR BLD CALC: 45 % (ref 40.5–52.5)
HEMOGLOBIN: 15.2 G/DL (ref 13.5–17.5)
INR BLD: 1.25 (ref 0.86–1.14)
LV EF: 58 %
LVEF MODALITY: NORMAL
MAGNESIUM: 2.3 MG/DL (ref 1.8–2.4)
MCH RBC QN AUTO: 30.3 PG (ref 26–34)
MCHC RBC AUTO-ENTMCNC: 33.7 G/DL (ref 31–36)
MCV RBC AUTO: 90 FL (ref 80–100)
PDW BLD-RTO: 13.4 % (ref 12.4–15.4)
PLATELET # BLD: 170 K/UL (ref 135–450)
PMV BLD AUTO: 9 FL (ref 5–10.5)
POC ACT LR: 257 SEC
POC ACT LR: 303 SEC
POC ACT LR: 310 SEC
POC ACT LR: 376 SEC
POC ACT LR: 383 SEC
POTASSIUM SERPL-SCNC: 4.2 MMOL/L (ref 3.5–5.1)
PROTHROMBIN TIME: 14.2 SEC (ref 9.8–13)
RBC # BLD: 5.01 M/UL (ref 4.2–5.9)
SODIUM BLD-SCNC: 141 MMOL/L (ref 136–145)
TOTAL PROTEIN: 7.5 G/DL (ref 6.4–8.2)
WBC # BLD: 5.6 K/UL (ref 4–11)

## 2018-12-12 PROCEDURE — 2580000003 HC RX 258

## 2018-12-12 PROCEDURE — 83735 ASSAY OF MAGNESIUM: CPT

## 2018-12-12 PROCEDURE — 93662 INTRACARDIAC ECG (ICE): CPT | Performed by: INTERNAL MEDICINE

## 2018-12-12 PROCEDURE — 93312 ECHO TRANSESOPHAGEAL: CPT | Performed by: INTERNAL MEDICINE

## 2018-12-12 PROCEDURE — C1732 CATH, EP, DIAG/ABL, 3D/VECT: HCPCS

## 2018-12-12 PROCEDURE — 93655 ICAR CATH ABLTJ DSCRT ARRHYT: CPT | Performed by: INTERNAL MEDICINE

## 2018-12-12 PROCEDURE — 93656 COMPRE EP EVAL ABLTJ ATR FIB: CPT | Performed by: INTERNAL MEDICINE

## 2018-12-12 PROCEDURE — 80053 COMPREHEN METABOLIC PANEL: CPT

## 2018-12-12 PROCEDURE — 93010 ELECTROCARDIOGRAM REPORT: CPT | Performed by: INTERNAL MEDICINE

## 2018-12-12 PROCEDURE — 93613 INTRACARDIAC EPHYS 3D MAPG: CPT | Performed by: INTERNAL MEDICINE

## 2018-12-12 PROCEDURE — 2500000003 HC RX 250 WO HCPCS

## 2018-12-12 PROCEDURE — 3700000000 HC ANESTHESIA ATTENDED CARE

## 2018-12-12 PROCEDURE — 93623 PRGRMD STIMJ&PACG IV RX NFS: CPT | Performed by: INTERNAL MEDICINE

## 2018-12-12 PROCEDURE — 85347 COAGULATION TIME ACTIVATED: CPT

## 2018-12-12 PROCEDURE — 6370000000 HC RX 637 (ALT 250 FOR IP): Performed by: INTERNAL MEDICINE

## 2018-12-12 PROCEDURE — 85610 PROTHROMBIN TIME: CPT

## 2018-12-12 PROCEDURE — 6360000002 HC RX W HCPCS

## 2018-12-12 PROCEDURE — C1759 CATH, INTRA ECHOCARDIOGRAPHY: HCPCS

## 2018-12-12 PROCEDURE — 6360000002 HC RX W HCPCS: Performed by: NURSE ANESTHETIST, CERTIFIED REGISTERED

## 2018-12-12 PROCEDURE — 86901 BLOOD TYPING SEROLOGIC RH(D): CPT

## 2018-12-12 PROCEDURE — 2500000003 HC RX 250 WO HCPCS: Performed by: NURSE ANESTHETIST, CERTIFIED REGISTERED

## 2018-12-12 PROCEDURE — 2580000003 HC RX 258: Performed by: NURSE ANESTHETIST, CERTIFIED REGISTERED

## 2018-12-12 PROCEDURE — 3700000001 HC ADD 15 MINUTES (ANESTHESIA)

## 2018-12-12 PROCEDURE — 86850 RBC ANTIBODY SCREEN: CPT

## 2018-12-12 PROCEDURE — 2580000003 HC RX 258: Performed by: INTERNAL MEDICINE

## 2018-12-12 PROCEDURE — C1769 GUIDE WIRE: HCPCS

## 2018-12-12 PROCEDURE — C1894 INTRO/SHEATH, NON-LASER: HCPCS

## 2018-12-12 PROCEDURE — 93325 DOPPLER ECHO COLOR FLOW MAPG: CPT | Performed by: INTERNAL MEDICINE

## 2018-12-12 PROCEDURE — 36415 COLL VENOUS BLD VENIPUNCTURE: CPT

## 2018-12-12 PROCEDURE — 93005 ELECTROCARDIOGRAM TRACING: CPT | Performed by: INTERNAL MEDICINE

## 2018-12-12 PROCEDURE — 85027 COMPLETE CBC AUTOMATED: CPT

## 2018-12-12 PROCEDURE — 2720000010 HC SURG SUPPLY STERILE

## 2018-12-12 PROCEDURE — 86900 BLOOD TYPING SEROLOGIC ABO: CPT

## 2018-12-12 PROCEDURE — 93320 DOPPLER ECHO COMPLETE: CPT | Performed by: INTERNAL MEDICINE

## 2018-12-12 RX ORDER — SODIUM CHLORIDE 9 MG/ML
INJECTION, SOLUTION INTRAVENOUS CONTINUOUS PRN
Status: DISCONTINUED | OUTPATIENT
Start: 2018-12-12 | End: 2018-12-12 | Stop reason: SDUPTHER

## 2018-12-12 RX ORDER — ATORVASTATIN CALCIUM 40 MG/1
40 TABLET, FILM COATED ORAL NIGHTLY
Status: DISCONTINUED | OUTPATIENT
Start: 2018-12-12 | End: 2018-12-13 | Stop reason: HOSPADM

## 2018-12-12 RX ORDER — SODIUM CHLORIDE, SODIUM LACTATE, POTASSIUM CHLORIDE, CALCIUM CHLORIDE 600; 310; 30; 20 MG/100ML; MG/100ML; MG/100ML; MG/100ML
INJECTION, SOLUTION INTRAVENOUS CONTINUOUS PRN
Status: DISCONTINUED | OUTPATIENT
Start: 2018-12-12 | End: 2018-12-12 | Stop reason: SDUPTHER

## 2018-12-12 RX ORDER — ONDANSETRON 2 MG/ML
INJECTION INTRAMUSCULAR; INTRAVENOUS PRN
Status: DISCONTINUED | OUTPATIENT
Start: 2018-12-12 | End: 2018-12-12 | Stop reason: SDUPTHER

## 2018-12-12 RX ORDER — PROPOFOL 10 MG/ML
INJECTION, EMULSION INTRAVENOUS PRN
Status: DISCONTINUED | OUTPATIENT
Start: 2018-12-12 | End: 2018-12-12 | Stop reason: SDUPTHER

## 2018-12-12 RX ORDER — LIDOCAINE HYDROCHLORIDE 20 MG/ML
INJECTION, SOLUTION INFILTRATION; PERINEURAL PRN
Status: DISCONTINUED | OUTPATIENT
Start: 2018-12-12 | End: 2018-12-12 | Stop reason: SDUPTHER

## 2018-12-12 RX ORDER — SUCCINYLCHOLINE/SOD CL,ISO/PF 100 MG/5ML
SYRINGE (ML) INTRAVENOUS PRN
Status: DISCONTINUED | OUTPATIENT
Start: 2018-12-12 | End: 2018-12-12 | Stop reason: SDUPTHER

## 2018-12-12 RX ORDER — FENTANYL CITRATE 50 UG/ML
INJECTION, SOLUTION INTRAMUSCULAR; INTRAVENOUS PRN
Status: DISCONTINUED | OUTPATIENT
Start: 2018-12-12 | End: 2018-12-12 | Stop reason: SDUPTHER

## 2018-12-12 RX ORDER — EPHEDRINE SULFATE 50 MG/ML
INJECTION INTRAVENOUS PRN
Status: DISCONTINUED | OUTPATIENT
Start: 2018-12-12 | End: 2018-12-12 | Stop reason: SDUPTHER

## 2018-12-12 RX ORDER — ALLOPURINOL 100 MG/1
100 TABLET ORAL DAILY
Status: DISCONTINUED | OUTPATIENT
Start: 2018-12-13 | End: 2018-12-13 | Stop reason: HOSPADM

## 2018-12-12 RX ORDER — HEPARIN SODIUM 1000 [USP'U]/ML
INJECTION, SOLUTION INTRAVENOUS; SUBCUTANEOUS PRN
Status: DISCONTINUED | OUTPATIENT
Start: 2018-12-12 | End: 2018-12-12 | Stop reason: SDUPTHER

## 2018-12-12 RX ORDER — ASPIRIN 81 MG/1
81 TABLET, CHEWABLE ORAL DAILY
Status: DISCONTINUED | OUTPATIENT
Start: 2018-12-12 | End: 2018-12-13 | Stop reason: HOSPADM

## 2018-12-12 RX ORDER — HEPARIN SODIUM 10000 [USP'U]/100ML
INJECTION, SOLUTION INTRAVENOUS CONTINUOUS PRN
Status: DISCONTINUED | OUTPATIENT
Start: 2018-12-12 | End: 2018-12-12 | Stop reason: SDUPTHER

## 2018-12-12 RX ORDER — GLYCOPYRROLATE 1 MG/5 ML
SYRINGE (ML) INTRAVENOUS PRN
Status: DISCONTINUED | OUTPATIENT
Start: 2018-12-12 | End: 2018-12-12 | Stop reason: SDUPTHER

## 2018-12-12 RX ORDER — ATENOLOL 50 MG/1
50 TABLET ORAL DAILY
Status: DISCONTINUED | OUTPATIENT
Start: 2018-12-13 | End: 2018-12-13 | Stop reason: HOSPADM

## 2018-12-12 RX ORDER — VENLAFAXINE HYDROCHLORIDE 37.5 MG/1
75 CAPSULE, EXTENDED RELEASE ORAL
Status: DISCONTINUED | OUTPATIENT
Start: 2018-12-13 | End: 2018-12-13 | Stop reason: HOSPADM

## 2018-12-12 RX ORDER — LISINOPRIL 20 MG/1
40 TABLET ORAL DAILY
Status: DISCONTINUED | OUTPATIENT
Start: 2018-12-13 | End: 2018-12-13 | Stop reason: HOSPADM

## 2018-12-12 RX ORDER — SODIUM CHLORIDE 0.9 % (FLUSH) 0.9 %
10 SYRINGE (ML) INJECTION PRN
Status: DISCONTINUED | OUTPATIENT
Start: 2018-12-12 | End: 2018-12-13 | Stop reason: HOSPADM

## 2018-12-12 RX ORDER — SODIUM CHLORIDE 0.9 % (FLUSH) 0.9 %
10 SYRINGE (ML) INJECTION EVERY 12 HOURS SCHEDULED
Status: DISCONTINUED | OUTPATIENT
Start: 2018-12-12 | End: 2018-12-13 | Stop reason: HOSPADM

## 2018-12-12 RX ORDER — MIDAZOLAM HYDROCHLORIDE 1 MG/ML
INJECTION INTRAMUSCULAR; INTRAVENOUS PRN
Status: DISCONTINUED | OUTPATIENT
Start: 2018-12-12 | End: 2018-12-12 | Stop reason: SDUPTHER

## 2018-12-12 RX ORDER — VENLAFAXINE 75 MG/1
1 TABLET ORAL DAILY
Status: DISCONTINUED | OUTPATIENT
Start: 2018-12-12 | End: 2018-12-12

## 2018-12-12 RX ORDER — ACETAMINOPHEN 325 MG/1
650 TABLET ORAL EVERY 4 HOURS PRN
Status: DISCONTINUED | OUTPATIENT
Start: 2018-12-12 | End: 2018-12-13 | Stop reason: HOSPADM

## 2018-12-12 RX ADMIN — PHENYLEPHRINE HYDROCHLORIDE 25 MCG/MIN: 10 INJECTION INTRAVENOUS at 08:26

## 2018-12-12 RX ADMIN — RIVAROXABAN 20 MG: 20 TABLET, FILM COATED ORAL at 11:42

## 2018-12-12 RX ADMIN — SODIUM CHLORIDE: 9 INJECTION, SOLUTION INTRAVENOUS at 08:39

## 2018-12-12 RX ADMIN — ISOPROTERENOL HYDROCHLORIDE 5 MCG/MIN: 0.2 INJECTION, SOLUTION INTRAMUSCULAR; INTRAVENOUS at 09:47

## 2018-12-12 RX ADMIN — FENTANYL CITRATE 50 MCG: 50 INJECTION, SOLUTION INTRAMUSCULAR; INTRAVENOUS at 10:47

## 2018-12-12 RX ADMIN — HEPARIN SODIUM 1000 UNITS: 1000 INJECTION INTRAVENOUS; SUBCUTANEOUS at 09:11

## 2018-12-12 RX ADMIN — MIDAZOLAM HYDROCHLORIDE 2 MG: 1 INJECTION, SOLUTION INTRAMUSCULAR; INTRAVENOUS at 07:28

## 2018-12-12 RX ADMIN — EPHEDRINE SULFATE 10 MG: 50 INJECTION INTRAVENOUS at 07:53

## 2018-12-12 RX ADMIN — HEPARIN SODIUM 1000 UNITS/HR: 10000 INJECTION, SOLUTION INTRAVENOUS at 08:50

## 2018-12-12 RX ADMIN — ASPIRIN 81 MG CHEWABLE TABLET 81 MG: 81 TABLET CHEWABLE at 11:42

## 2018-12-12 RX ADMIN — ONDANSETRON 4 MG: 2 INJECTION INTRAMUSCULAR; INTRAVENOUS at 10:39

## 2018-12-12 RX ADMIN — FENTANYL CITRATE 50 MCG: 50 INJECTION, SOLUTION INTRAMUSCULAR; INTRAVENOUS at 10:19

## 2018-12-12 RX ADMIN — DESMOPRESSIN ACETATE 40 MG: 0.2 TABLET ORAL at 20:48

## 2018-12-12 RX ADMIN — Medication 0.2 MG: at 07:46

## 2018-12-12 RX ADMIN — Medication 60 MG: at 07:37

## 2018-12-12 RX ADMIN — PROPOFOL 160 MG: 10 INJECTION, EMULSION INTRAVENOUS at 07:36

## 2018-12-12 RX ADMIN — HEPARIN SODIUM 3000 UNITS: 1000 INJECTION INTRAVENOUS; SUBCUTANEOUS at 08:27

## 2018-12-12 RX ADMIN — SODIUM CHLORIDE: 9 INJECTION, SOLUTION INTRAVENOUS at 07:30

## 2018-12-12 RX ADMIN — EPHEDRINE SULFATE 10 MG: 50 INJECTION INTRAVENOUS at 08:19

## 2018-12-12 RX ADMIN — Medication 10 ML: at 11:42

## 2018-12-12 RX ADMIN — Medication 10 ML: at 20:49

## 2018-12-12 RX ADMIN — SODIUM CHLORIDE, POTASSIUM CHLORIDE, SODIUM LACTATE AND CALCIUM CHLORIDE: 600; 310; 30; 20 INJECTION, SOLUTION INTRAVENOUS at 10:49

## 2018-12-12 RX ADMIN — FENTANYL CITRATE 50 MCG: 50 INJECTION, SOLUTION INTRAMUSCULAR; INTRAVENOUS at 07:32

## 2018-12-12 RX ADMIN — HEPARIN SODIUM 5000 UNITS: 1000 INJECTION INTRAVENOUS; SUBCUTANEOUS at 08:31

## 2018-12-12 RX ADMIN — LIDOCAINE HYDROCHLORIDE 100 MG: 20 INJECTION, SOLUTION INFILTRATION; PERINEURAL at 07:36

## 2018-12-12 ASSESSMENT — PULMONARY FUNCTION TESTS
PIF_VALUE: 16
PIF_VALUE: 13
PIF_VALUE: 12
PIF_VALUE: 13
PIF_VALUE: 13
PIF_VALUE: 16
PIF_VALUE: 12
PIF_VALUE: 16
PIF_VALUE: 13
PIF_VALUE: 16
PIF_VALUE: 13
PIF_VALUE: 12
PIF_VALUE: 16
PIF_VALUE: 14
PIF_VALUE: 16
PIF_VALUE: 13
PIF_VALUE: 12
PIF_VALUE: 13
PIF_VALUE: 1
PIF_VALUE: 16
PIF_VALUE: 13
PIF_VALUE: 16
PIF_VALUE: 1
PIF_VALUE: 14
PIF_VALUE: 13
PIF_VALUE: 13
PIF_VALUE: 14
PIF_VALUE: 14
PIF_VALUE: 13
PIF_VALUE: 16
PIF_VALUE: 14
PIF_VALUE: 0
PIF_VALUE: 13
PIF_VALUE: 13
PIF_VALUE: 12
PIF_VALUE: 13
PIF_VALUE: 13
PIF_VALUE: 14
PIF_VALUE: 14
PIF_VALUE: 13
PIF_VALUE: 1
PIF_VALUE: 13
PIF_VALUE: 1
PIF_VALUE: 14
PIF_VALUE: 13
PIF_VALUE: 0
PIF_VALUE: 13
PIF_VALUE: 12
PIF_VALUE: 13
PIF_VALUE: 13
PIF_VALUE: 1
PIF_VALUE: 13
PIF_VALUE: 12
PIF_VALUE: 12
PIF_VALUE: 13
PIF_VALUE: 14
PIF_VALUE: 14
PIF_VALUE: 12
PIF_VALUE: 1
PIF_VALUE: 13
PIF_VALUE: 12
PIF_VALUE: 12
PIF_VALUE: 14
PIF_VALUE: 13
PIF_VALUE: 16
PIF_VALUE: 1
PIF_VALUE: 12
PIF_VALUE: 14
PIF_VALUE: 13
PIF_VALUE: 4
PIF_VALUE: 16
PIF_VALUE: 13
PIF_VALUE: 16
PIF_VALUE: 13
PIF_VALUE: 12
PIF_VALUE: 13
PIF_VALUE: 14
PIF_VALUE: 14
PIF_VALUE: 2
PIF_VALUE: 13
PIF_VALUE: 13
PIF_VALUE: 12
PIF_VALUE: 1
PIF_VALUE: 12
PIF_VALUE: 13
PIF_VALUE: 14
PIF_VALUE: 13
PIF_VALUE: 14
PIF_VALUE: 14
PIF_VALUE: 13
PIF_VALUE: 13
PIF_VALUE: 1
PIF_VALUE: 14
PIF_VALUE: 0
PIF_VALUE: 13
PIF_VALUE: 14
PIF_VALUE: 12
PIF_VALUE: 14
PIF_VALUE: 16
PIF_VALUE: 13
PIF_VALUE: 14
PIF_VALUE: 12
PIF_VALUE: 13
PIF_VALUE: 13
PIF_VALUE: 14
PIF_VALUE: 1
PIF_VALUE: 13
PIF_VALUE: 16
PIF_VALUE: 16
PIF_VALUE: 13
PIF_VALUE: 12
PIF_VALUE: 12
PIF_VALUE: 16
PIF_VALUE: 16
PIF_VALUE: 13
PIF_VALUE: 13
PIF_VALUE: 14
PIF_VALUE: 12
PIF_VALUE: 13
PIF_VALUE: 14
PIF_VALUE: 13
PIF_VALUE: 13
PIF_VALUE: 24
PIF_VALUE: 13
PIF_VALUE: 17
PIF_VALUE: 13
PIF_VALUE: 14
PIF_VALUE: 13
PIF_VALUE: 0
PIF_VALUE: 12
PIF_VALUE: 16
PIF_VALUE: 12
PIF_VALUE: 14
PIF_VALUE: 17
PIF_VALUE: 13
PIF_VALUE: 12
PIF_VALUE: 13
PIF_VALUE: 12
PIF_VALUE: 19
PIF_VALUE: 13
PIF_VALUE: 13
PIF_VALUE: 14
PIF_VALUE: 0
PIF_VALUE: 12
PIF_VALUE: 13
PIF_VALUE: 12
PIF_VALUE: 12
PIF_VALUE: 13
PIF_VALUE: 14
PIF_VALUE: 13
PIF_VALUE: 14
PIF_VALUE: 14
PIF_VALUE: 13
PIF_VALUE: 16
PIF_VALUE: 13
PIF_VALUE: 12
PIF_VALUE: 14
PIF_VALUE: 1
PIF_VALUE: 14
PIF_VALUE: 16
PIF_VALUE: 1
PIF_VALUE: 16
PIF_VALUE: 12
PIF_VALUE: 14
PIF_VALUE: 14
PIF_VALUE: 13
PIF_VALUE: 16
PIF_VALUE: 12
PIF_VALUE: 14
PIF_VALUE: 14
PIF_VALUE: 12
PIF_VALUE: 12
PIF_VALUE: 13
PIF_VALUE: 13
PIF_VALUE: 14
PIF_VALUE: 13
PIF_VALUE: 13
PIF_VALUE: 2
PIF_VALUE: 16
PIF_VALUE: 0
PIF_VALUE: 16
PIF_VALUE: 13
PIF_VALUE: 13
PIF_VALUE: 14

## 2018-12-12 ASSESSMENT — PAIN SCALES - GENERAL
PAINLEVEL_OUTOF10: 0

## 2018-12-12 NOTE — PLAN OF CARE
Problem: PAIN  Goal: Patient's pain/discomfort is manageable  Outcome: Ongoing  Pain level assessment complete. Pt denied c/o pain. Pt educated on pain scale and control interventions. PRN pain medication given per pt request. Pt VU to call out c new onset of pain or unrelieved pain. Will continue to monitor. Lizabeth Deutsch RN      Problem: Falls - Risk of:  Goal: Absence of physical injury  Absence of physical injury   Outcome: Ongoing  Fall risk precautions in place, Fall risk assessment protocol followed. Bed in lowest position with wheels locked, SR up X2, non-skid socks on pt, call light in reach, pt encouraged to call before getting out of bed and for any other needs or complaints. No falls this shift. Will continue to monitor S. Lyle Gaines RN

## 2018-12-12 NOTE — ANESTHESIA PRE PROCEDURE
Type & Screen (If Applicable):  No results found for: LABABO, [address]    Anesthesia Evaluation  Patient summary reviewed and Nursing notes reviewed no history of anesthetic complications:   Airway: Mallampati: II  TM distance: >3 FB   Neck ROM: full  Mouth opening: > = 3 FB Dental: normal exam         Pulmonary:normal exam  breath sounds clear to auscultation  (+) sleep apnea: on CPAP,      (-) COPD and asthma                           Cardiovascular:    (+) hypertension:, dysrhythmias (PAF, rate controlled and anticoagulated): atrial fibrillation, hyperlipidemia    (-) past MI, CAD (10/2/18 neg pefusion issues, rapid a-fib), CABG/stent,  angina and  CHF (echo 8/18 Ef 55-60%, mod AR/WA, mild MR, no RWMA, RVSP 43)    ECG reviewed  Rhythm: regular  Rate: normal  Echocardiogram reviewed  Stress test reviewed  Cleared by cardiology     Beta Blocker:  Dose within 24 Hrs         Neuro/Psych:   (+) CVA (8/18, residual vision issues, 2/2 a-fib, neg CTA at time):, depression/anxiety    (-) seizures           GI/Hepatic/Renal: Neg GI/Hepatic/Renal ROS       (-) GERD, liver disease and no renal disease       Endo/Other:    (+) : arthritis (Gout):., .    (-) diabetes mellitus, hypothyroidism, hyperthyroidism               Abdominal:           Vascular:                                        Anesthesia Plan      general     ASA 3       Induction: intravenous. MIPS: Postoperative opioids intended and Prophylactic antiemetics administered. Anesthetic plan and risks discussed with patient. Plan discussed with CRNA.                   Radha Ornelas MD   12/12/2018

## 2018-12-12 NOTE — H&P
enlarged.   -A bubble study was performed and fails to show evidence of shunting.   -IVC size is normal (<2.1 cm) but collapses < 50% with respiration.   -Normal diastolic function. BRI:  Indication:  CVA     Findings:      Normal LV function  No cardiac source of emboli  Negative bubble study  Mild-moderate MR and AI  Cath: N/A    Medication:  No current facility-administered medications for this encounter. Assessment and plan:     - Symptomatic paroxysmal atrial fibrillation with antiarrhythmic therapy failure. I have discussed treatment options including antiarrhythmics, rate control with anticoagulation and pacemaker implantation, and ablation in detail with patient. Risks, benefits and alternatives were explained to patient. He is bradycardic at baseline and adding antiarrhythmic therapy will make him more bradycardic. Atrial fibrillation ablation procedure has been discussed in detail with patient. Risk, benefit, alternative, and rationale of the procedure has been discussed with the patient. Risks associated with procedure include but not limited to allergic reaction to the medications, pain, bleeding, infection, nerve injury, injury to diaphragm(breathing muscle), pulmonary embolus(blood clot in lungs), deep vein blood clot, pneumothorax, hemothorax, acute renal failure, cardiac perforation,  tamponade, need for emergent surgery (open heart), permanent pacemaker, pulmonary vein stenosis, left atrial to esophageal fistula, stroke, myocardial infarction and death. I have explained that ablation therapy is symptoms driven and alternative such as anticoagulation and rate control can be considered. I also explained that atrial fibrillation cannot be cured by ablation. On average patients need two ablation procedures. The success rate with one procedure is about 65-70%.  A detailed educational information about ablation has been provided and patient has been encouraged to review information and voice recognition software. Every effort was made to ensure accuracy, however, inadvertent computerized transcription errors may be present.        Blanca Gordon MD, MPH  [place] [address]   Office: [phone]

## 2018-12-12 NOTE — PROGRESS NOTES
Figure 8 suture removed from right femoral ablation site. Pressure held x5 minutes. Walker catheter removed, pt tolerated well. Pt assisted up to BR, able to void on own. Mild burning and blood noted in urine. Encouraged to drink ample water. Pt ambulated back to chair. Right groin cath site soft, no hematoma/oozing noted.  Blake Fontaine RN

## 2018-12-13 VITALS
HEIGHT: 69 IN | HEART RATE: 70 BPM | WEIGHT: 184.75 LBS | BODY MASS INDEX: 27.36 KG/M2 | OXYGEN SATURATION: 97 % | RESPIRATION RATE: 18 BRPM | TEMPERATURE: 98.4 F | DIASTOLIC BLOOD PRESSURE: 64 MMHG | SYSTOLIC BLOOD PRESSURE: 139 MMHG

## 2018-12-13 LAB
ANION GAP SERPL CALCULATED.3IONS-SCNC: 10 MMOL/L (ref 3–16)
BILIRUBIN URINE: NEGATIVE
BLOOD, URINE: ABNORMAL
BUN BLDV-MCNC: 11 MG/DL (ref 7–20)
CALCIUM SERPL-MCNC: 8.4 MG/DL (ref 8.3–10.6)
CHLORIDE BLD-SCNC: 104 MMOL/L (ref 99–110)
CLARITY: ABNORMAL
CO2: 25 MMOL/L (ref 21–32)
COLOR: YELLOW
CREAT SERPL-MCNC: 1 MG/DL (ref 0.8–1.3)
EPITHELIAL CELLS, UA: 0 /HPF (ref 0–5)
GFR AFRICAN AMERICAN: >60
GFR NON-AFRICAN AMERICAN: >60
GLUCOSE BLD-MCNC: 113 MG/DL (ref 70–99)
GLUCOSE URINE: NEGATIVE MG/DL
HCT VFR BLD CALC: 41.6 % (ref 40.5–52.5)
HEMOGLOBIN: 14.3 G/DL (ref 13.5–17.5)
HYALINE CASTS: 0 /LPF (ref 0–8)
KETONES, URINE: NEGATIVE MG/DL
LEUKOCYTE ESTERASE, URINE: NEGATIVE
MCH RBC QN AUTO: 30.6 PG (ref 26–34)
MCHC RBC AUTO-ENTMCNC: 34.4 G/DL (ref 31–36)
MCV RBC AUTO: 88.9 FL (ref 80–100)
MICROSCOPIC EXAMINATION: YES
NITRITE, URINE: NEGATIVE
PDW BLD-RTO: 14.1 % (ref 12.4–15.4)
PH UA: 5.5
PLATELET # BLD: 151 K/UL (ref 135–450)
PMV BLD AUTO: 8.8 FL (ref 5–10.5)
POTASSIUM SERPL-SCNC: 4.2 MMOL/L (ref 3.5–5.1)
PROTEIN UA: NEGATIVE MG/DL
RBC # BLD: 4.68 M/UL (ref 4.2–5.9)
RBC UA: 4 /HPF (ref 0–4)
REASON FOR REJECTION: NORMAL
REASON FOR REJECTION: NORMAL
REJECTED TEST: NORMAL
REJECTED TEST: NORMAL
SODIUM BLD-SCNC: 139 MMOL/L (ref 136–145)
SPECIFIC GRAVITY UA: 1.01
URINE TYPE: ABNORMAL
UROBILINOGEN, URINE: 0.2 E.U./DL
WBC # BLD: 9.1 K/UL (ref 4–11)
WBC UA: 0 /HPF (ref 0–5)

## 2018-12-13 PROCEDURE — 36415 COLL VENOUS BLD VENIPUNCTURE: CPT

## 2018-12-13 PROCEDURE — 6370000000 HC RX 637 (ALT 250 FOR IP): Performed by: INTERNAL MEDICINE

## 2018-12-13 PROCEDURE — APPNB30 APP NON BILLABLE TIME 0-30 MINS: Performed by: NURSE PRACTITIONER

## 2018-12-13 PROCEDURE — 2580000003 HC RX 258: Performed by: INTERNAL MEDICINE

## 2018-12-13 PROCEDURE — 81001 URINALYSIS AUTO W/SCOPE: CPT

## 2018-12-13 PROCEDURE — 85027 COMPLETE CBC AUTOMATED: CPT

## 2018-12-13 PROCEDURE — 80048 BASIC METABOLIC PNL TOTAL CA: CPT

## 2018-12-13 RX ORDER — PANTOPRAZOLE SODIUM 40 MG/1
40 TABLET, DELAYED RELEASE ORAL
Qty: 30 TABLET | Refills: 1 | Status: SHIPPED | OUTPATIENT
Start: 2018-12-13 | End: 2020-07-07

## 2018-12-13 RX ADMIN — ASPIRIN 81 MG CHEWABLE TABLET 81 MG: 81 TABLET CHEWABLE at 09:16

## 2018-12-13 RX ADMIN — Medication 10 ML: at 09:27

## 2018-12-13 RX ADMIN — RIVAROXABAN 20 MG: 20 TABLET, FILM COATED ORAL at 09:16

## 2018-12-13 RX ADMIN — LISINOPRIL 40 MG: 20 TABLET ORAL at 09:15

## 2018-12-13 RX ADMIN — VENLAFAXINE HYDROCHLORIDE 75 MG: 37.5 CAPSULE, EXTENDED RELEASE ORAL at 09:16

## 2018-12-13 RX ADMIN — ATENOLOL 50 MG: 50 TABLET ORAL at 09:16

## 2018-12-13 RX ADMIN — ALLOPURINOL 100 MG: 100 TABLET ORAL at 09:16

## 2018-12-13 ASSESSMENT — PAIN SCALES - GENERAL
PAINLEVEL_OUTOF10: 0

## 2018-12-13 NOTE — DISCHARGE SUMMARY
a statin  The patient is not on antiplatelet therapy  The patient does have history of AF, and is not on anticoagulation    1. Overall the patient is stable from CV standpoint  2. Most recent cardiac testing has been reviewed as above. Further testing is not required at this time. 3. The patient is not currently smoking. The risks related to smoking were reviewed with the patient. Recommend maintaining a smoke-free lifestyle. Products available for smoking cessation were discussed in detail. Activity: activity as tolerated  Diet: cardiac diet  Wound Care: keep wound clean and dry    Follow-up with [address] in 2 weeks. Recommend follow up with PCP in 3-4 weeks. Signed:  Katie Mixon CNP, 12/13/2018, 12:34 PM  Time spent on discharge of patient: >31 minutes, including plan of care, patient education, and care coordination.

## 2018-12-13 NOTE — PROGRESS NOTES
Discharge instructions reviewed with patient and family member. Patient and family verbalized understanding. All home medications have been reviewed, questions answered and patient voiced understanding. All medication side effects reviewed and patient and family verbalized understanding. Patient given prescriptions, discharge instructions, and appointment times. Patient discharged to home with family via private car. Taken to lobby via wheelchair. Follow up appointment(s) reviewed with patient and all attempts made to schedule within 7 days of discharge. [facility]  Depression Screen    1. During the past month, have you often been bothered by feeling down, depressed, or hopeless? No    2. During the past month, have you often been bothered by little interest or pleasure in       doing things?    No

## 2018-12-14 RX ORDER — ALLOPURINOL 100 MG/1
TABLET ORAL
Qty: 90 TABLET | Refills: 1 | Status: SHIPPED | OUTPATIENT
Start: 2018-12-14 | End: 2019-06-09 | Stop reason: SDUPTHER

## 2019-01-15 ENCOUNTER — OFFICE VISIT (OUTPATIENT)
Dept: CARDIOLOGY CLINIC | Age: 69
End: 2019-01-15
Payer: MEDICARE

## 2019-01-15 VITALS
DIASTOLIC BLOOD PRESSURE: 98 MMHG | BODY MASS INDEX: 26.66 KG/M2 | HEIGHT: 69 IN | WEIGHT: 180 LBS | SYSTOLIC BLOOD PRESSURE: 182 MMHG

## 2019-01-15 DIAGNOSIS — I48.3 TYPICAL ATRIAL FLUTTER (HCC): ICD-10-CM

## 2019-01-15 DIAGNOSIS — I10 ESSENTIAL HYPERTENSION: ICD-10-CM

## 2019-01-15 DIAGNOSIS — I48.0 PAROXYSMAL ATRIAL FIBRILLATION (HCC): Primary | ICD-10-CM

## 2019-01-15 DIAGNOSIS — I49.5 TACHY-BRADY SYNDROME (HCC): ICD-10-CM

## 2019-01-15 PROCEDURE — 1101F PT FALLS ASSESS-DOCD LE1/YR: CPT | Performed by: INTERNAL MEDICINE

## 2019-01-15 PROCEDURE — 99214 OFFICE O/P EST MOD 30 MIN: CPT | Performed by: INTERNAL MEDICINE

## 2019-01-15 PROCEDURE — 4040F PNEUMOC VAC/ADMIN/RCVD: CPT | Performed by: INTERNAL MEDICINE

## 2019-01-15 PROCEDURE — G8598 ASA/ANTIPLAT THER USED: HCPCS | Performed by: INTERNAL MEDICINE

## 2019-01-15 PROCEDURE — 1123F ACP DISCUSS/DSCN MKR DOCD: CPT | Performed by: INTERNAL MEDICINE

## 2019-01-15 PROCEDURE — 93000 ELECTROCARDIOGRAM COMPLETE: CPT | Performed by: INTERNAL MEDICINE

## 2019-01-15 PROCEDURE — G8427 DOCREV CUR MEDS BY ELIG CLIN: HCPCS | Performed by: INTERNAL MEDICINE

## 2019-01-15 PROCEDURE — G8417 CALC BMI ABV UP PARAM F/U: HCPCS | Performed by: INTERNAL MEDICINE

## 2019-01-15 PROCEDURE — G8482 FLU IMMUNIZE ORDER/ADMIN: HCPCS | Performed by: INTERNAL MEDICINE

## 2019-01-15 PROCEDURE — 1036F TOBACCO NON-USER: CPT | Performed by: INTERNAL MEDICINE

## 2019-01-15 PROCEDURE — 3017F COLORECTAL CA SCREEN DOC REV: CPT | Performed by: INTERNAL MEDICINE

## 2019-01-15 RX ORDER — AMLODIPINE BESYLATE 5 MG/1
5 TABLET ORAL DAILY
Qty: 90 TABLET | Refills: 3 | Status: SHIPPED | OUTPATIENT
Start: 2019-01-15 | End: 2019-04-16

## 2019-02-05 ENCOUNTER — TELEPHONE (OUTPATIENT)
Dept: CARDIOLOGY CLINIC | Age: 69
End: 2019-02-05

## 2019-02-07 ENCOUNTER — TELEPHONE (OUTPATIENT)
Dept: INTERNAL MEDICINE CLINIC | Age: 69
End: 2019-02-07

## 2019-02-07 DIAGNOSIS — R31.0 GROSS HEMATURIA: Primary | ICD-10-CM

## 2019-02-11 ENCOUNTER — TELEPHONE (OUTPATIENT)
Dept: CARDIOLOGY CLINIC | Age: 69
End: 2019-02-11

## 2019-02-12 RX ORDER — RIVAROXABAN 20 MG/1
TABLET, FILM COATED ORAL
Qty: 30 TABLET | Refills: 11 | Status: SHIPPED | OUTPATIENT
Start: 2019-02-12 | End: 2019-04-16 | Stop reason: SDUPTHER

## 2019-02-13 DIAGNOSIS — I10 ESSENTIAL HYPERTENSION, BENIGN: ICD-10-CM

## 2019-02-14 RX ORDER — LISINOPRIL 40 MG/1
TABLET ORAL
Qty: 90 TABLET | Refills: 3 | Status: SHIPPED | OUTPATIENT
Start: 2019-02-14 | End: 2020-02-11

## 2019-02-20 ENCOUNTER — TELEPHONE (OUTPATIENT)
Dept: INTERNAL MEDICINE CLINIC | Age: 69
End: 2019-02-20

## 2019-03-06 ENCOUNTER — HOSPITAL ENCOUNTER (OUTPATIENT)
Dept: CT IMAGING | Age: 69
Discharge: HOME | End: 2019-03-06
Payer: MEDICARE

## 2019-03-06 ENCOUNTER — HOSPITAL ENCOUNTER (OUTPATIENT)
Age: 69
Discharge: HOME | End: 2019-03-06
Payer: MEDICARE

## 2019-03-06 DIAGNOSIS — R31.0 GROSS HEMATURIA: ICD-10-CM

## 2019-03-06 LAB — PROSTATE SPECIFIC ANTIGEN: 1.52 NG/ML (ref 0–4)

## 2019-03-06 PROCEDURE — 36415 COLL VENOUS BLD VENIPUNCTURE: CPT

## 2019-03-06 PROCEDURE — 84153 ASSAY OF PSA TOTAL: CPT

## 2019-03-06 PROCEDURE — 6360000004 HC RX CONTRAST MEDICATION: Performed by: UROLOGY

## 2019-03-06 PROCEDURE — 74178 CT ABD&PLV WO CNTR FLWD CNTR: CPT

## 2019-03-06 RX ADMIN — IOPAMIDOL 100 ML: 755 INJECTION, SOLUTION INTRAVENOUS at 07:01

## 2019-04-04 ENCOUNTER — NURSE ONLY (OUTPATIENT)
Dept: CARDIOLOGY CLINIC | Age: 69
End: 2019-04-04

## 2019-04-11 NOTE — PROGRESS NOTES
[address]   Electrophysiology Follow Up  Date: 4/16/2019  CC: Nose bleeding   HPI: Rani Cardenas is a 77 y.o. male history of CVA, paroxysmal atrial fibrillation, hypertension, hyperlipidemia, and sleep apnea (treated with cpap). He was admitted to [facility] 8/5/18 with acute CVA. BRI was normal, negative bubble study. He was discharged home with an [address]. MCOT revealed many episodes of atrial fib with several low and high heart rates. The lowest heart rate being 29 and highest being 196. Patient did not report symptoms. He continued Atenolol and Aspirin. Xarelto was started and Plavix discontinued. 12/12/18   - Pulmonary vein isolations using wide area circumferential radiofrequency ablation   - Additional ablation of CTI right atrial flutter     Interval History: Patient states that he hasn't had any irregular heartbeat or palpitations since ablation. He has occasional nose bleeding which is brief. He also had some hematuria and saw his urologist and was evaluated and all the tests were negative according to the patient. He was told that he had enlarged prostate. Denies having any chest pressure or shortness of breath or palpitation. Past Medical History:   Diagnosis Date    Cerebral artery occlusion with cerebral infarction ([address]     Hyperlipidemia     Hypertension     Sleep apnea     uses c-pap        Past Surgical History:   Procedure Laterality Date    APPENDECTOMY      ATRIAL ABLATION SURGERY  12/12/2018    Performed by Dr. Adryan Haines.  COLONOSCOPY  11/09    HEMICOLECTOMY  10/21/2016     OPEN RIGHT HEMICOLECTOMY                  TONSILLECTOMY         Allergies:  No Known Allergies    Social History:   reports that he has never smoked. He has never used smokeless tobacco. He reports that he drinks alcohol. He reports that he does not use drugs. Family History:  family history includes Coronary Art Dis in his brother; High Blood Pressure in his father and mother. thickness and systolic function with an   estimated ejection fraction of 55-60%.  -No regional wall motion abnormalities are seen.   -Mild-moderate aortic regurgitation.   -Mild mitral regurgitation.   -Mild tricuspid regurgitation with a estimated RVSP of 43 mmHg.   -Moderate pulmonic regurgitation present.   -The right ventricle is borderline enlarged.   -A bubble study was performed and fails to show evidence of shunting.   -IVC size is normal (<2.1 cm) but collapses < 50% with respiration.   -Normal diastolic function. BRI:  Indication:  CVA     Findings:      Normal LV function  No cardiac source of emboli  Negative bubble study  Mild-moderate MR and AI  Cath: N/A    Medication:  Current Outpatient Medications   Medication Sig Dispense Refill    amLODIPine (NORVASC) 10 MG tablet Take 1 tablet by mouth daily 90 tablet 3    atenolol (TENORMIN) 50 MG tablet TAKE ONE TABLET BY MOUTH DAILY 90 tablet 0    lisinopril (PRINIVIL;ZESTRIL) 40 MG tablet TAKE ONE TABLET BY MOUTH DAILY 90 tablet 3    allopurinol (ZYLOPRIM) 100 MG tablet TAKE ONE TABLET BY MOUTH DAILY 90 tablet 1    atorvastatin (LIPITOR) 40 MG tablet TAKE ONE TABLET BY MOUTH DAILY 90 tablet 3    rivaroxaban (XARELTO) 20 MG TABS tablet Take 1 tablet by mouth daily (with breakfast) 30 tablet 11    venlafaxine (EFFEXOR) 75 MG tablet TAKE ONE TABLET BY MOUTH DAILY (Patient taking differently: TAKE ONE  HALF TABLET BY MOUTH DAILY) 90 tablet 3    pantoprazole (PROTONIX) 40 MG tablet Take 1 tablet by mouth every morning (before breakfast) 30 tablet 1     No current facility-administered medications for this visit. Assessment and plan:       - Symptomatic paroxysmal atrial fibrillation with antiarrhythmic therapy failure. No recurrence of atrial fibrillation    Holter monitoring with normal atrial fibrillation. He had very brief episodes of PAT for a few beats.   Asymptomatic    12/12/18 PVI and additional ablation of CTI   Feeling much better Alia Benavides, RN    Physician Attestation: I, Dr. Melissa So, confirm that the scribe's documentation has been prepared under my direction and personally reviewed by me in its entirety. I also confirm that the note above accurately reflects all work, treatment, procedures, and medical decision making performed by me.

## 2019-04-16 ENCOUNTER — OFFICE VISIT (OUTPATIENT)
Dept: CARDIOLOGY CLINIC | Age: 69
End: 2019-04-16
Payer: MEDICARE

## 2019-04-16 VITALS
RESPIRATION RATE: 16 BRPM | BODY MASS INDEX: 27.55 KG/M2 | SYSTOLIC BLOOD PRESSURE: 152 MMHG | WEIGHT: 186 LBS | DIASTOLIC BLOOD PRESSURE: 79 MMHG | HEART RATE: 51 BPM | HEIGHT: 69 IN

## 2019-04-16 DIAGNOSIS — I49.5 TACHY-BRADY SYNDROME (HCC): ICD-10-CM

## 2019-04-16 DIAGNOSIS — I48.0 PAROXYSMAL ATRIAL FIBRILLATION (HCC): Primary | ICD-10-CM

## 2019-04-16 PROCEDURE — G8427 DOCREV CUR MEDS BY ELIG CLIN: HCPCS | Performed by: INTERNAL MEDICINE

## 2019-04-16 PROCEDURE — 1036F TOBACCO NON-USER: CPT | Performed by: INTERNAL MEDICINE

## 2019-04-16 PROCEDURE — 1123F ACP DISCUSS/DSCN MKR DOCD: CPT | Performed by: INTERNAL MEDICINE

## 2019-04-16 PROCEDURE — 4040F PNEUMOC VAC/ADMIN/RCVD: CPT | Performed by: INTERNAL MEDICINE

## 2019-04-16 PROCEDURE — 3017F COLORECTAL CA SCREEN DOC REV: CPT | Performed by: INTERNAL MEDICINE

## 2019-04-16 PROCEDURE — 93227 XTRNL ECG REC<48 HR R&I: CPT | Performed by: INTERNAL MEDICINE

## 2019-04-16 PROCEDURE — 99214 OFFICE O/P EST MOD 30 MIN: CPT | Performed by: INTERNAL MEDICINE

## 2019-04-16 PROCEDURE — 93000 ELECTROCARDIOGRAM COMPLETE: CPT | Performed by: INTERNAL MEDICINE

## 2019-04-16 PROCEDURE — G8417 CALC BMI ABV UP PARAM F/U: HCPCS | Performed by: INTERNAL MEDICINE

## 2019-04-16 PROCEDURE — G8598 ASA/ANTIPLAT THER USED: HCPCS | Performed by: INTERNAL MEDICINE

## 2019-04-16 RX ORDER — AMLODIPINE BESYLATE 10 MG/1
10 TABLET ORAL DAILY
Qty: 90 TABLET | Refills: 3 | Status: SHIPPED | OUTPATIENT
Start: 2019-04-16 | End: 2020-05-18 | Stop reason: SDUPTHER

## 2019-04-16 NOTE — LETTER
[address] [place] [address] [address] [address]  Phone: [phone]  Fax: [phone]    Fox Lee MD        April 16, 2019     Wenceslao Dawson, [address] [place] [address]    Patient: Vicente Aden  MR Number: [id number]  YOB: 1950  Date of Visit: 4/16/2019    Dear Dr. Wenceslao Dawson:    Below are the relevant portions of my assessment and plan of care. [employer]   Electrophysiology Follow Up  Date: 4/16/2019  CC: Nose bleeding   HPI: Vicente Aden is a 77 y.o. male history of CVA, paroxysmal atrial fibrillation, hypertension, hyperlipidemia, and sleep apnea (treated with cpap). He was admitted to [facility] 8/5/18 with acute CVA. BRI was normal, negative bubble study. He was discharged home with an [address] [facility]. MCOT revealed many episodes of atrial fib with several low and high heart rates. The lowest heart rate being 29 and highest being 196. Patient did not report symptoms. He continued Atenolol and Aspirin. Xarelto was started and Plavix discontinued. 12/12/18   - Pulmonary vein isolations using wide area circumferential radiofrequency ablation   - Additional ablation of CTI right atrial flutter     Interval History: Patient states that he hasn't had any irregular heartbeat or palpitations since ablation. He has occasional nose bleeding which is brief. He also had some hematuria and saw his urologist and was evaluated and all the tests were negative according to the patient. He was told that he had enlarged prostate. Denies having any chest pressure or shortness of breath or palpitation. Past Medical History:   Diagnosis Date    Cerebral artery occlusion with cerebral infarction [address] [address]     Hyperlipidemia     Hypertension     Sleep apnea     uses c-pap        Past Surgical History:   Procedure Laterality Date    APPENDECTOMY      ATRIAL ABLATION SURGERY  12/12/2018    Performed by Dr. Vasyl Ram Chief Complaint   Patient presents with   • Ear Problem     rm 2       HISTORY OF PRESENT ILLNESS:  Patient is a 16 mo M brought in by mom for cough, congestion, and ear tugging. Patient was treated for a similar episode 1 month ago with augmentin. Mom states the patient got better with the antibiotic and that this new episode began 4 days ago. States he has been having congestion and a cough. The cough started out dry but has become more productive since yesterday. She also notes that he has been pulling on his ears, the left more than the right. They have used the nebulizer which has helped, last time was yesterday afternoon. States he has been more tired and crabby. No known fevers.      Allergies as of 04/18/2017   • (No Known Allergies)       Current Outpatient Prescriptions on File Prior to Visit:  beclomethasone diprop (QVAR) 40 MCG/ACT inhaler   Spacer/Aero-Holding Chambers (AEROCHAMBER PLUS W/MASK) Misc   albuterol (VENTOLIN) (2.5 MG/3ML) 0.083% nebulizer solution     No current facility-administered medications on file prior to visit.   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      • Patent ductus arteriosus    • PDA (patent ductus arteriosus) 3/7/2016    S/p ligation at [facility]. 1/8/16 3/2016 Maria L--doing well, neg exam--f/u PRN    • Premature birth     27 weeks gestation   • Prematurity, 1,000-1,249 grams, 27-28 completed weeks 3/7/2016    - born after placental abruption Due for f/u clinic at 2 month? (said on d/c summary)--will ask mom--would think it is 6 months?    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  Nontoxic and well nourished.  HEAD:  Normocephalic, atraumatic.  EYES:  No conjunctival injection.  Pupils equal and reactive to light.  EARS:  External auditory canals patent.  Bilateral tympanic membranes erythematous and bulging with pus present.   NOSE:  Nares patent and clear.  THROAT:  Moist mucous membranes, no erythema or exudates.  NECK:  Supple.  No lymphadenopathy.  HEART:  Regular rate and rhythm.  No murmurs.  No gallop or heave.  LUNGS:  Diffuse wheezing throughout. No rhonchi, retractions.  No increased work of breathing. Albuterol neb given in office. Follow-up exam is much improved. Scattered wheezes present.  Good air entry to the bases  ABDOMEN:  Soft, non-distended, no hepatosplenomegaly, no masses.  EXTREMITIES:  Normal.  SKIN:  No rashes or nodules.    ASSESSMENT:  Otitis media, bilateral  -Omnicef QD for 10 days  -Watch for fevers, new symptoms  -Follow up if worsening or new symptoms arise.     Cough/Wheezing  -Albuterol nebs Q3-4 hours for 48 hours. Continue to monitor for new or worsening symptoms. Follow up in 2-3 days to recheck or sooner if needed    COLONOSCOPY  11/09    HEMICOLECTOMY  10/21/2016     OPEN RIGHT HEMICOLECTOMY                  TONSILLECTOMY         Allergies:  No Known Allergies    Social History:   reports that he has never smoked. He has never used smokeless tobacco. He reports that he drinks alcohol. He reports that he does not use drugs. Family History:  family history includes Coronary Art Dis in his brother; High Blood Pressure in his father and mother. Review of System:  All other systems reviewed except for that noted above. Pertinent negatives and positives are:     · General: negative for fever, chills   · Ophthalmic ROS: negative for - eye pain or loss of vision  · ENT ROS: negative for - headaches, sore throat   · Respiratory: negative for - cough, sputum  · Cardiovascular: Reviewed in HPI  · Gastrointestinal: negative for - abdominal pain, diarrhea, N/V  · Hematology: negative for - bleeding, blood clots, bruising or jaundice  · Genito-Urinary:  negative for - Dysuria or incontinence  · Musculoskeletal: negative for - Joint swelling, muscle pain  · Neurological: negative for - confusion, dizziness, headaches   · Psychiatric: No anxiety, no depression. · Dermatological: negative for - rash      Physical Examination:  Vitals:    04/16/19 0804   BP: (!) 152/79   Pulse: 51   Resp:       Wt Readings from Last 3 Encounters:   04/16/19 186 lb (84.4 kg)   01/15/19 180 lb (81.6 kg)   12/13/18 184 lb 11.9 oz (83.8 kg)   Constitutional: Oriented. No distress. Head: Normocephalic and atraumatic. Mouth/Throat: Oropharynx is clear and moist.   Eyes: Conjunctivae normal. EOM are normal.   Neck: Neck supple. No JVD present. Cardiovascular: Normal rate, regular rhythm, S1&S2. Pulmonary/Chest: Bilateral respiratory sounds. No rhonchi. Abdominal: Soft. No tenderness. Musculoskeletal: No tenderness. No edema    Lymphadenopathy: Has no cervical adenopathy. Neurological: Alert and oriented.  Follows command, No Gross deficit Skin: Skin is warm, No rash noted. Psychiatric: Has a normal behavior     Labs, diagnostic and imaging results reviewed. Reviewed. Lab Results   Component Value Date    TSH 2.54 2014    TSH 3.37 10/14/2011    TSH 2.36 10/06/2010    CREATININE 1.0 2018    CREATININE 1.0 2018    AST 17 2018    ALT 22 2018     EC19  Sinus rhythm     Echo:   TTE:   Summary   -Normal left ventricle size, wall thickness and systolic function with an   estimated ejection fraction of 55-60%.  -No regional wall motion abnormalities are seen.   -Mild-moderate aortic regurgitation.   -Mild mitral regurgitation.   -Mild tricuspid regurgitation with a estimated RVSP of 43 mmHg.   -Moderate pulmonic regurgitation present.   -The right ventricle is borderline enlarged.   -A bubble study was performed and fails to show evidence of shunting.   -IVC size is normal (<2.1 cm) but collapses < 50% with respiration.   -Normal diastolic function.     BRI:  Indication:  CVA     Findings:      Normal LV function  No cardiac source of emboli  Negative bubble study  Mild-moderate MR and AI  Cath: N/A    Medication:  Current Outpatient Medications   Medication Sig Dispense Refill    amLODIPine (NORVASC) 10 MG tablet Take 1 tablet by mouth daily 90 tablet 3    atenolol (TENORMIN) 50 MG tablet TAKE ONE TABLET BY MOUTH DAILY 90 tablet 0    lisinopril (PRINIVIL;ZESTRIL) 40 MG tablet TAKE ONE TABLET BY MOUTH DAILY 90 tablet 3    allopurinol (ZYLOPRIM) 100 MG tablet TAKE ONE TABLET BY MOUTH DAILY 90 tablet 1    atorvastatin (LIPITOR) 40 MG tablet TAKE ONE TABLET BY MOUTH DAILY 90 tablet 3    rivaroxaban (XARELTO) 20 MG TABS tablet Take 1 tablet by mouth daily (with breakfast) 30 tablet 11    venlafaxine (EFFEXOR) 75 MG tablet TAKE ONE TABLET BY MOUTH DAILY (Patient taking differently: TAKE ONE  HALF TABLET BY MOUTH DAILY) 90 tablet 3    pantoprazole (PROTONIX) 40 MG tablet Take 1 tablet by mouth every morning (before breakfast) 30 tablet 1     No current facility-administered medications for this visit. Assessment and plan:       - Symptomatic paroxysmal atrial fibrillation with antiarrhythmic therapy failure. No recurrence of atrial fibrillation    Holter monitoring with normal atrial fibrillation. He had very brief episodes of PAT for a few beats. Asymptomatic    12/12/18 PVI and additional ablation of CTI   Feeling much better since ablation when he is in normal rhythm. Continue with Xarelto. Continue on atenolol  [id number] score of 4 (Age, HTN, CVA) and high risk for stroke and thromboembolism. Anticoagulation is recommended. D/c ASA due to nose bleeding and history of hematuria     - Prior MCOT revealed tachycardic episodes up to 196 and bradycardic episodes as low as 29 mostly asymptomatic. No symptoms reported. Patient remains asymptomatic. Holter with no pauses. - CVA:    Resolved. Secondary to paroxysmal atrial fibrillation. Needs lifelong anticoagulations therapy    - HTN: Not controlled today. Increase Norvasc to 10 mg daily. Continue checking outpatient ambulatory blood pressure monitoring. Follow up with PCP. - The patient is counseled to follow a low salt diet to assure blood pressure remains controlled for cardiovascular risk factor modification.   - The patient is counseled to avoid excess caffeine, and energy drinks as this may exacerbated ectopy and arrhythmia. - The patient is counseled to get regular exercise 3-5 times per week to control cardiovascular risk factors. - The patient is counseled to lose weigt to control cardiovascular risk factors. - The patient is counseled to avoid tobacco use. Thank you for allowing me to participate in the care of Lidia Kimbrough. Further evaluation will be based upon the patient's clinical course and testing results. All questions and concerns were addressed to the patient/family. Alternatives to my treatment were discussed. I have discussed the above stated plan and the patient verbalized understanding and agreed with the plan. NOTE: This report was transcribed using voice recognition software. Every effort was made to ensure accuracy, however, inadvertent computerized transcription errors may be present. Lonnie Joshi MD, MPH  [place] [address]   Office: [phone]     Scribe attestation: This note was scribed by Diane Rich RN    Physician Attestation: I, Dr. Lonnie Joshi, confirm that the scribe's documentation has been prepared under my direction and personally reviewed by me in its entirety. I also confirm that the note above accurately reflects all work, treatment, procedures, and medical decision making performed by me. If you have questions, please do not hesitate to call me. I look forward to following Suze Yuba along with you.     Sincerely,        Lonnie Joshi MD

## 2019-04-25 ENCOUNTER — OFFICE VISIT (OUTPATIENT)
Dept: INTERNAL MEDICINE CLINIC | Age: 69
End: 2019-04-25
Payer: MEDICARE

## 2019-04-25 VITALS
SYSTOLIC BLOOD PRESSURE: 138 MMHG | BODY MASS INDEX: 27.02 KG/M2 | WEIGHT: 183 LBS | DIASTOLIC BLOOD PRESSURE: 76 MMHG | HEART RATE: 64 BPM

## 2019-04-25 DIAGNOSIS — I10 ESSENTIAL HYPERTENSION: Primary | ICD-10-CM

## 2019-04-25 PROCEDURE — G8510 SCR DEP NEG, NO PLAN REQD: HCPCS | Performed by: INTERNAL MEDICINE

## 2019-04-25 PROCEDURE — 4040F PNEUMOC VAC/ADMIN/RCVD: CPT | Performed by: INTERNAL MEDICINE

## 2019-04-25 PROCEDURE — G8427 DOCREV CUR MEDS BY ELIG CLIN: HCPCS | Performed by: INTERNAL MEDICINE

## 2019-04-25 PROCEDURE — G8598 ASA/ANTIPLAT THER USED: HCPCS | Performed by: INTERNAL MEDICINE

## 2019-04-25 PROCEDURE — 1123F ACP DISCUSS/DSCN MKR DOCD: CPT | Performed by: INTERNAL MEDICINE

## 2019-04-25 PROCEDURE — 1036F TOBACCO NON-USER: CPT | Performed by: INTERNAL MEDICINE

## 2019-04-25 PROCEDURE — 99213 OFFICE O/P EST LOW 20 MIN: CPT | Performed by: INTERNAL MEDICINE

## 2019-04-25 PROCEDURE — G8417 CALC BMI ABV UP PARAM F/U: HCPCS | Performed by: INTERNAL MEDICINE

## 2019-04-25 PROCEDURE — 3017F COLORECTAL CA SCREEN DOC REV: CPT | Performed by: INTERNAL MEDICINE

## 2019-04-25 ASSESSMENT — PATIENT HEALTH QUESTIONNAIRE - PHQ9
1. LITTLE INTEREST OR PLEASURE IN DOING THINGS: 0
SUM OF ALL RESPONSES TO PHQ QUESTIONS 1-9: 0
SUM OF ALL RESPONSES TO PHQ QUESTIONS 1-9: 0
SUM OF ALL RESPONSES TO PHQ9 QUESTIONS 1 & 2: 0
2. FEELING DOWN, DEPRESSED OR HOPELESS: 0

## 2019-06-10 RX ORDER — ALLOPURINOL 100 MG/1
TABLET ORAL
Qty: 90 TABLET | Refills: 0 | Status: SHIPPED | OUTPATIENT
Start: 2019-06-10 | End: 2019-09-11 | Stop reason: SDUPTHER

## 2019-07-15 RX ORDER — ATENOLOL 50 MG/1
TABLET ORAL
Qty: 90 TABLET | Refills: 0 | Status: SHIPPED | OUTPATIENT
Start: 2019-07-15 | End: 2019-10-14 | Stop reason: SDUPTHER

## 2019-09-12 RX ORDER — ALLOPURINOL 100 MG/1
TABLET ORAL
Qty: 90 TABLET | Refills: 0 | Status: SHIPPED | OUTPATIENT
Start: 2019-09-12 | End: 2019-12-09 | Stop reason: SDUPTHER

## 2019-10-15 RX ORDER — ATENOLOL 50 MG/1
TABLET ORAL
Qty: 90 TABLET | Refills: 0 | Status: SHIPPED | OUTPATIENT
Start: 2019-10-15 | End: 2020-01-07

## 2019-10-23 ENCOUNTER — OFFICE VISIT (OUTPATIENT)
Dept: INTERNAL MEDICINE CLINIC | Age: 69
End: 2019-10-23
Payer: MEDICARE

## 2019-10-23 VITALS
HEART RATE: 68 BPM | SYSTOLIC BLOOD PRESSURE: 140 MMHG | DIASTOLIC BLOOD PRESSURE: 74 MMHG | BODY MASS INDEX: 26.43 KG/M2 | WEIGHT: 179 LBS

## 2019-10-23 DIAGNOSIS — I10 ESSENTIAL HYPERTENSION, BENIGN: Primary | ICD-10-CM

## 2019-10-23 DIAGNOSIS — Z23 NEED FOR INFLUENZA VACCINATION: ICD-10-CM

## 2019-10-23 LAB
A/G RATIO: 2.6 (ref 1.1–2.2)
ALBUMIN SERPL-MCNC: 5.4 G/DL (ref 3.4–5)
ALP BLD-CCNC: 113 U/L (ref 40–129)
ALT SERPL-CCNC: 25 U/L (ref 10–40)
ANION GAP SERPL CALCULATED.3IONS-SCNC: 15 MMOL/L (ref 3–16)
AST SERPL-CCNC: 20 U/L (ref 15–37)
BILIRUB SERPL-MCNC: 0.5 MG/DL (ref 0–1)
BUN BLDV-MCNC: 15 MG/DL (ref 7–20)
CALCIUM SERPL-MCNC: 9.6 MG/DL (ref 8.3–10.6)
CHLORIDE BLD-SCNC: 104 MMOL/L (ref 99–110)
CHOLESTEROL, TOTAL: 142 MG/DL (ref 0–199)
CO2: 24 MMOL/L (ref 21–32)
CREAT SERPL-MCNC: 0.9 MG/DL (ref 0.8–1.3)
GFR AFRICAN AMERICAN: >60
GFR NON-AFRICAN AMERICAN: >60
GLOBULIN: 2.1 G/DL
GLUCOSE BLD-MCNC: 103 MG/DL (ref 70–99)
HDLC SERPL-MCNC: 46 MG/DL (ref 40–60)
LDL CHOLESTEROL CALCULATED: 62 MG/DL
POTASSIUM SERPL-SCNC: 4.6 MMOL/L (ref 3.5–5.1)
SODIUM BLD-SCNC: 143 MMOL/L (ref 136–145)
TOTAL PROTEIN: 7.5 G/DL (ref 6.4–8.2)
TRIGL SERPL-MCNC: 168 MG/DL (ref 0–150)
VLDLC SERPL CALC-MCNC: 34 MG/DL

## 2019-10-23 PROCEDURE — G0008 ADMIN INFLUENZA VIRUS VAC: HCPCS | Performed by: INTERNAL MEDICINE

## 2019-10-23 PROCEDURE — G8598 ASA/ANTIPLAT THER USED: HCPCS | Performed by: INTERNAL MEDICINE

## 2019-10-23 PROCEDURE — G8427 DOCREV CUR MEDS BY ELIG CLIN: HCPCS | Performed by: INTERNAL MEDICINE

## 2019-10-23 PROCEDURE — 1036F TOBACCO NON-USER: CPT | Performed by: INTERNAL MEDICINE

## 2019-10-23 PROCEDURE — 4040F PNEUMOC VAC/ADMIN/RCVD: CPT | Performed by: INTERNAL MEDICINE

## 2019-10-23 PROCEDURE — 3288F FALL RISK ASSESSMENT DOCD: CPT | Performed by: INTERNAL MEDICINE

## 2019-10-23 PROCEDURE — G8417 CALC BMI ABV UP PARAM F/U: HCPCS | Performed by: INTERNAL MEDICINE

## 2019-10-23 PROCEDURE — 3017F COLORECTAL CA SCREEN DOC REV: CPT | Performed by: INTERNAL MEDICINE

## 2019-10-23 PROCEDURE — 90653 IIV ADJUVANT VACCINE IM: CPT | Performed by: INTERNAL MEDICINE

## 2019-10-23 PROCEDURE — 99213 OFFICE O/P EST LOW 20 MIN: CPT | Performed by: INTERNAL MEDICINE

## 2019-10-23 PROCEDURE — 1123F ACP DISCUSS/DSCN MKR DOCD: CPT | Performed by: INTERNAL MEDICINE

## 2019-10-23 PROCEDURE — G8482 FLU IMMUNIZE ORDER/ADMIN: HCPCS | Performed by: INTERNAL MEDICINE

## 2019-11-05 ENCOUNTER — OFFICE VISIT (OUTPATIENT)
Dept: CARDIOLOGY CLINIC | Age: 69
End: 2019-11-05
Payer: MEDICARE

## 2019-11-05 VITALS
WEIGHT: 179 LBS | HEIGHT: 69 IN | HEART RATE: 53 BPM | BODY MASS INDEX: 26.51 KG/M2 | RESPIRATION RATE: 16 BRPM | SYSTOLIC BLOOD PRESSURE: 149 MMHG | DIASTOLIC BLOOD PRESSURE: 73 MMHG

## 2019-11-05 DIAGNOSIS — I48.3 TYPICAL ATRIAL FLUTTER (HCC): ICD-10-CM

## 2019-11-05 DIAGNOSIS — I63.9 CEREBROVASCULAR ACCIDENT (CVA), UNSPECIFIED MECHANISM (HCC): ICD-10-CM

## 2019-11-05 DIAGNOSIS — I49.5 TACHY-BRADY SYNDROME (HCC): ICD-10-CM

## 2019-11-05 DIAGNOSIS — I48.0 PAROXYSMAL ATRIAL FIBRILLATION (HCC): Primary | ICD-10-CM

## 2019-11-05 PROCEDURE — G8598 ASA/ANTIPLAT THER USED: HCPCS | Performed by: INTERNAL MEDICINE

## 2019-11-05 PROCEDURE — 1036F TOBACCO NON-USER: CPT | Performed by: INTERNAL MEDICINE

## 2019-11-05 PROCEDURE — 93000 ELECTROCARDIOGRAM COMPLETE: CPT | Performed by: INTERNAL MEDICINE

## 2019-11-05 PROCEDURE — 1123F ACP DISCUSS/DSCN MKR DOCD: CPT | Performed by: INTERNAL MEDICINE

## 2019-11-05 PROCEDURE — 99214 OFFICE O/P EST MOD 30 MIN: CPT | Performed by: INTERNAL MEDICINE

## 2019-11-05 PROCEDURE — 3017F COLORECTAL CA SCREEN DOC REV: CPT | Performed by: INTERNAL MEDICINE

## 2019-11-05 PROCEDURE — 4040F PNEUMOC VAC/ADMIN/RCVD: CPT | Performed by: INTERNAL MEDICINE

## 2019-11-05 PROCEDURE — G8482 FLU IMMUNIZE ORDER/ADMIN: HCPCS | Performed by: INTERNAL MEDICINE

## 2019-11-05 PROCEDURE — G8417 CALC BMI ABV UP PARAM F/U: HCPCS | Performed by: INTERNAL MEDICINE

## 2019-11-05 PROCEDURE — G8427 DOCREV CUR MEDS BY ELIG CLIN: HCPCS | Performed by: INTERNAL MEDICINE

## 2019-11-13 ENCOUNTER — APPOINTMENT (OUTPATIENT)
Dept: GENERAL RADIOLOGY | Age: 69
End: 2019-11-13
Payer: MEDICARE

## 2019-11-13 ENCOUNTER — HOSPITAL ENCOUNTER (EMERGENCY)
Age: 69
Discharge: HOME | End: 2019-11-13
Attending: EMERGENCY MEDICINE
Payer: MEDICARE

## 2019-11-13 VITALS
OXYGEN SATURATION: 98 % | DIASTOLIC BLOOD PRESSURE: 70 MMHG | HEIGHT: 69 IN | HEART RATE: 62 BPM | BODY MASS INDEX: 26.51 KG/M2 | RESPIRATION RATE: 16 BRPM | TEMPERATURE: 98.1 F | WEIGHT: 179 LBS | SYSTOLIC BLOOD PRESSURE: 177 MMHG

## 2019-11-13 DIAGNOSIS — S29.011A MUSCLE STRAIN OF CHEST WALL, INITIAL ENCOUNTER: ICD-10-CM

## 2019-11-13 DIAGNOSIS — R07.9 CHEST PAIN, UNSPECIFIED TYPE: Primary | ICD-10-CM

## 2019-11-13 LAB
ANION GAP SERPL CALCULATED.3IONS-SCNC: 12 MMOL/L (ref 3–16)
BASOPHILS ABSOLUTE: 0 K/UL (ref 0–0.2)
BASOPHILS RELATIVE PERCENT: 0.1 %
BUN BLDV-MCNC: 16 MG/DL (ref 7–20)
CALCIUM SERPL-MCNC: 9.3 MG/DL (ref 8.3–10.6)
CHLORIDE BLD-SCNC: 103 MMOL/L (ref 99–110)
CO2: 24 MMOL/L (ref 21–32)
CREAT SERPL-MCNC: 0.9 MG/DL (ref 0.8–1.3)
EOSINOPHILS ABSOLUTE: 0.2 K/UL (ref 0–0.6)
EOSINOPHILS RELATIVE PERCENT: 2.6 %
GFR AFRICAN AMERICAN: >60
GFR NON-AFRICAN AMERICAN: >60
GLUCOSE BLD-MCNC: 109 MG/DL (ref 70–99)
HCT VFR BLD CALC: 44.5 % (ref 40.5–52.5)
HEMOGLOBIN: 14.6 G/DL (ref 13.5–17.5)
LYMPHOCYTES ABSOLUTE: 1.6 K/UL (ref 1–5.1)
LYMPHOCYTES RELATIVE PERCENT: 25.5 %
MCH RBC QN AUTO: 30 PG (ref 26–34)
MCHC RBC AUTO-ENTMCNC: 32.9 G/DL (ref 31–36)
MCV RBC AUTO: 91.2 FL (ref 80–100)
MONOCYTES ABSOLUTE: 0.5 K/UL (ref 0–1.3)
MONOCYTES RELATIVE PERCENT: 7.7 %
NEUTROPHILS ABSOLUTE: 4.1 K/UL (ref 1.7–7.7)
NEUTROPHILS RELATIVE PERCENT: 64.1 %
PDW BLD-RTO: 13.4 % (ref 12.4–15.4)
PLATELET # BLD: 208 K/UL (ref 135–450)
PMV BLD AUTO: 8.7 FL (ref 5–10.5)
POTASSIUM SERPL-SCNC: 4.2 MMOL/L (ref 3.5–5.1)
RBC # BLD: 4.87 M/UL (ref 4.2–5.9)
SODIUM BLD-SCNC: 139 MMOL/L (ref 136–145)
TROPONIN: <0.01 NG/ML
TROPONIN: <0.01 NG/ML
WBC # BLD: 6.4 K/UL (ref 4–11)

## 2019-11-13 PROCEDURE — 99285 EMERGENCY DEPT VISIT HI MDM: CPT

## 2019-11-13 PROCEDURE — 71046 X-RAY EXAM CHEST 2 VIEWS: CPT

## 2019-11-13 PROCEDURE — 36415 COLL VENOUS BLD VENIPUNCTURE: CPT

## 2019-11-13 PROCEDURE — 80048 BASIC METABOLIC PNL TOTAL CA: CPT

## 2019-11-13 PROCEDURE — 85025 COMPLETE CBC W/AUTO DIFF WBC: CPT

## 2019-11-13 PROCEDURE — 93005 ELECTROCARDIOGRAM TRACING: CPT | Performed by: EMERGENCY MEDICINE

## 2019-11-13 PROCEDURE — 84484 ASSAY OF TROPONIN QUANT: CPT

## 2019-11-13 RX ORDER — CYCLOBENZAPRINE HCL 10 MG
10 TABLET ORAL ONCE
Status: DISCONTINUED | OUTPATIENT
Start: 2019-11-13 | End: 2019-11-13

## 2019-11-13 RX ORDER — ASPIRIN 81 MG/1
324 TABLET, CHEWABLE ORAL ONCE
Status: DISCONTINUED | OUTPATIENT
Start: 2019-11-13 | End: 2019-11-14 | Stop reason: HOSPADM

## 2019-11-13 RX ORDER — ACETAMINOPHEN 500 MG
1000 TABLET ORAL ONCE
Status: DISCONTINUED | OUTPATIENT
Start: 2019-11-13 | End: 2019-11-13

## 2019-11-13 ASSESSMENT — PAIN SCALES - GENERAL: PAINLEVEL_OUTOF10: 3

## 2019-11-14 LAB
EKG ATRIAL RATE: 61 BPM
EKG DIAGNOSIS: NORMAL
EKG P AXIS: 77 DEGREES
EKG P-R INTERVAL: 190 MS
EKG Q-T INTERVAL: 432 MS
EKG QRS DURATION: 88 MS
EKG QTC CALCULATION (BAZETT): 434 MS
EKG R AXIS: 44 DEGREES
EKG T AXIS: 28 DEGREES
EKG VENTRICULAR RATE: 61 BPM

## 2019-11-14 PROCEDURE — 93010 ELECTROCARDIOGRAM REPORT: CPT | Performed by: INTERNAL MEDICINE

## 2019-11-14 ASSESSMENT — ENCOUNTER SYMPTOMS
NAUSEA: 0
ABDOMINAL PAIN: 0
COUGH: 0
SHORTNESS OF BREATH: 0
VOMITING: 0
RHINORRHEA: 0
DIARRHEA: 0

## 2019-12-10 RX ORDER — ALLOPURINOL 100 MG/1
TABLET ORAL
Qty: 90 TABLET | Refills: 3 | Status: SHIPPED | OUTPATIENT
Start: 2019-12-10 | End: 2020-12-10

## 2019-12-10 RX ORDER — ATORVASTATIN CALCIUM 40 MG/1
TABLET, FILM COATED ORAL
Qty: 90 TABLET | Refills: 3 | Status: SHIPPED | OUTPATIENT
Start: 2019-12-10 | End: 2020-12-10

## 2019-12-31 RX ORDER — VENLAFAXINE 75 MG/1
TABLET ORAL
Qty: 90 TABLET | Refills: 2 | Status: SHIPPED | OUTPATIENT
Start: 2019-12-31 | End: 2021-07-01 | Stop reason: SDUPTHER

## 2020-01-07 RX ORDER — ATENOLOL 50 MG/1
TABLET ORAL
Qty: 90 TABLET | Refills: 3 | Status: SHIPPED | OUTPATIENT
Start: 2020-01-07 | End: 2021-01-05 | Stop reason: SDUPTHER

## 2020-01-07 NOTE — TELEPHONE ENCOUNTER
Called patient and informed him that he DOES needs to continue taking Xarelto at this time with his history of stroke and afib. Discussed that this will likely be necessary life-long for stoke prevention. He was concerned about cost of Xarelto, I discussed other [address] [place] [address] options including Eliquis, Pradaxa, or Coumadin. He would like to stick with Xarelto for now. He does not need an Rx refill at this time. I did give him the [employer] phone number for possible financial assistance.       Bhakti Christianson, AMANDO-CNP

## 2020-02-04 NOTE — TELEPHONE ENCOUNTER
Pended a refill request for your approval for Xarelto 20 mg #90. Last OV with RMM 11/5/19     - Symptomatic paroxysmal atrial fibrillation with antiarrhythmic therapy failure. Patient has remained in sinus rhythm              12/12/18 PVI and additional ablation of CTI               Denies having any palpitation or chest pressure. Feeling better ins sinus rhythm. Continue with Xarelto.                 Continue on atenolol

## 2020-02-11 RX ORDER — LISINOPRIL 40 MG/1
TABLET ORAL
Qty: 90 TABLET | Refills: 2 | Status: SHIPPED | OUTPATIENT
Start: 2020-02-11 | End: 2020-11-13

## 2020-04-13 ENCOUNTER — TELEPHONE (OUTPATIENT)
Dept: INTERNAL MEDICINE CLINIC | Age: 70
End: 2020-04-13

## 2020-05-18 RX ORDER — AMLODIPINE BESYLATE 10 MG/1
TABLET ORAL
Qty: 90 TABLET | Refills: 3 | Status: SHIPPED | OUTPATIENT
Start: 2020-05-18 | End: 2021-05-03

## 2020-05-28 ENCOUNTER — TELEPHONE (OUTPATIENT)
Dept: INTERNAL MEDICINE CLINIC | Age: 70
End: 2020-05-28

## 2020-05-28 ENCOUNTER — TELEPHONE (OUTPATIENT)
Dept: CARDIOLOGY CLINIC | Age: 70
End: 2020-05-28

## 2020-05-29 NOTE — TELEPHONE ENCOUNTER
Discussed with Kiera Sol and she will write letter. Will print and have him sign.     AMANDO Juárez-CNP

## 2020-06-05 ENCOUNTER — OFFICE VISIT (OUTPATIENT)
Dept: INTERNAL MEDICINE CLINIC | Age: 70
End: 2020-06-05
Payer: MEDICARE

## 2020-06-05 VITALS
SYSTOLIC BLOOD PRESSURE: 142 MMHG | WEIGHT: 177 LBS | HEART RATE: 56 BPM | OXYGEN SATURATION: 98 % | DIASTOLIC BLOOD PRESSURE: 80 MMHG | TEMPERATURE: 98.6 F | HEIGHT: 69 IN | BODY MASS INDEX: 26.22 KG/M2

## 2020-06-05 PROCEDURE — 1036F TOBACCO NON-USER: CPT | Performed by: INTERNAL MEDICINE

## 2020-06-05 PROCEDURE — G8427 DOCREV CUR MEDS BY ELIG CLIN: HCPCS | Performed by: INTERNAL MEDICINE

## 2020-06-05 PROCEDURE — 99214 OFFICE O/P EST MOD 30 MIN: CPT | Performed by: INTERNAL MEDICINE

## 2020-06-05 PROCEDURE — 4040F PNEUMOC VAC/ADMIN/RCVD: CPT | Performed by: INTERNAL MEDICINE

## 2020-06-05 PROCEDURE — G8417 CALC BMI ABV UP PARAM F/U: HCPCS | Performed by: INTERNAL MEDICINE

## 2020-06-05 PROCEDURE — 3017F COLORECTAL CA SCREEN DOC REV: CPT | Performed by: INTERNAL MEDICINE

## 2020-06-05 PROCEDURE — 1123F ACP DISCUSS/DSCN MKR DOCD: CPT | Performed by: INTERNAL MEDICINE

## 2020-06-05 SDOH — ECONOMIC STABILITY: TRANSPORTATION INSECURITY
IN THE PAST 12 MONTHS, HAS LACK OF TRANSPORTATION KEPT YOU FROM MEETINGS, WORK, OR FROM GETTING THINGS NEEDED FOR DAILY LIVING?: NO

## 2020-06-05 SDOH — ECONOMIC STABILITY: INCOME INSECURITY: HOW HARD IS IT FOR YOU TO PAY FOR THE VERY BASICS LIKE FOOD, HOUSING, MEDICAL CARE, AND HEATING?: NOT HARD AT ALL

## 2020-06-05 SDOH — ECONOMIC STABILITY: TRANSPORTATION INSECURITY
IN THE PAST 12 MONTHS, HAS THE LACK OF TRANSPORTATION KEPT YOU FROM MEDICAL APPOINTMENTS OR FROM GETTING MEDICATIONS?: NO

## 2020-06-05 ASSESSMENT — PATIENT HEALTH QUESTIONNAIRE - PHQ9
SUM OF ALL RESPONSES TO PHQ QUESTIONS 1-9: 0
SUM OF ALL RESPONSES TO PHQ QUESTIONS 1-9: 0
SUM OF ALL RESPONSES TO PHQ9 QUESTIONS 1 & 2: 0
1. LITTLE INTEREST OR PLEASURE IN DOING THINGS: 0
2. FEELING DOWN, DEPRESSED OR HOPELESS: 0

## 2020-06-05 NOTE — PROGRESS NOTES
DAILY 90 tablet 3    allopurinol (ZYLOPRIM) 100 MG tablet TAKE ONE TABLET BY MOUTH DAILY 90 tablet 3    pantoprazole (PROTONIX) 40 MG tablet Take 1 tablet by mouth every morning (before breakfast) 30 tablet 1     No current facility-administered medications on file prior to visit.         Past Medical History:   Diagnosis Date    Cerebral artery occlusion with cerebral infarction ([facility] [address]     Hyperlipidemia     Hypertension     Sleep apnea     uses c-pap           BP (!) 142/80   Pulse 56   Temp 98.6 °F (37 °C)   Ht 5' 9\" (1.753 m)   Wt 177 lb (80.3 kg)   SpO2 98%   BMI 26.14 kg/m²     General Appearance:  Alert, cooperative, no distress, appears stated age   Head:  Normocephalic, without obvious abnormality, atraumatic   Neck: Supple, symmetrical, trachea midline, no adenopathy, thyroid: not enlarged, symmetric, no tenderness/mass/nodules, no carotid bruit or JVD   Lungs:   Clear to auscultation bilaterally, respirations unlabored   Chest Wall:  No tenderness or deformity   Heart:  Regular rate and rhythm, S1, S2 normal, no murmur, rub or gallop   Abdomen:   Soft, non-tender, bowel sounds active all four quadrants,  no masses, no organomegaly   Skin: Skin color, texture, turgor normal, no rashes or lesions   Lymph nodes:    Musculoskeletal Cervical, supraclavicular  Adenopathy is absent    Bilateral knee effusions         No components found for: CHLPL  Lab Results   Component Value Date    TRIG 168 (H) 10/23/2019    TRIG 370 (H) 08/06/2018    TRIG 513 (H) 10/19/2017     Lab Results   Component Value Date    HDL 46 10/23/2019    HDL 32 (L) 08/06/2018    HDL 38 (L) 10/19/2017     Lab Results   Component Value Date    LDLCALC 62 10/23/2019    LDLCALC see below 08/06/2018    LDLCALC see below 10/19/2017     Lab Results   Component Value Date    LABVLDL 34 10/23/2019    LABVLDL see below 08/06/2018    LABVLDL see below 10/19/2017     Lab Results   Component Value Date    CREATININE 0.9 11/13/2019

## 2020-07-06 NOTE — PROGRESS NOTES
[facility]   Electrophysiology Follow Up  Date: 7/7/2020    CC: Atrial fibrillation     HPI: Wilmer Rose is a 72 y.o. male being seen today as a new patient for atrial fibrillation and tachy delroy syndrome referred by Dr Ebony Stacy. He has a past medical history of CVA, paroxysmal atrial fibrillation, hypertension, hyperlipidemia, and sleep apnea (treated with cpap). He was admitted to [facility] 8/5/18 with acute CVA. BRI was normal, negative bubble study. He was discharged home with an [facility]. MCOT revealed many episodes of atrial fib with several low and high heart rates. The lowest heart rate being 29 and highest being 196. Patient did not report symptoms. He continued Atenolol and Aspirin. Xarelto was started and Plavix discontinued. S/p 12/12/18  - Pulmonary vein isolations using wide area circumferential radiofrequency ablation   - Additional ablation of CTI right atrial flutter     Interval history: Today he states that he had his knee surgery done. Denies having any chest pain or shortness of breath. He has not had any atrial fibrillation since last visit. Compliant with his medication. Past Medical History:   Diagnosis Date    Atrial fibrillation ([address]     Cerebral artery occlusion with cerebral infarction ([address]     Hyperlipidemia     Hypertension     Sleep apnea     uses c-pap        Past Surgical History:   Procedure Laterality Date    APPENDECTOMY      ATRIAL ABLATION SURGERY  12/12/2018    Performed by Dr. Soto Muro.  COLONOSCOPY  11/09    HEMICOLECTOMY  10/21/2016     OPEN RIGHT HEMICOLECTOMY                  REVISION TOTAL KNEE ARTHROPLASTY Right 06/11/2020    TONSILLECTOMY         Allergies:  No Known Allergies    Social History:   reports that he has never smoked. He has never used smokeless tobacco. He reports current alcohol use. He reports that he does not use drugs.      Family History:  family history includes Coronary Art Dis in his brother; High Blood Pressure in his father and mother. Review of System:  All other systems reviewed except for that noted above. Pertinent negatives and positives are:      General: negative for fever, chills   Ophthalmic ROS: negative for - eye pain or loss of vision  ENT ROS: negative for - headaches, sore throat + occasional nosebleed  Respiratory: negative for - cough, sputum  Cardiovascular: Reviewed in HPI  Gastrointestinal: negative for - abdominal pain, diarrhea, N/V  Hematology: negative for - bleeding, blood clots, bruising or jaundice  Genito-Urinary:  negative for - Dysuria or incontinence  Musculoskeletal: negative for - Joint swelling, muscle pain  Neurological: negative for - confusion, dizziness, headaches   Psychiatric: No anxiety, no depression. Dermatological: negative for - rash    Physical Examination:  Vitals:    20 0734   BP: (!) 128/52   Pulse: 56      Wt Readings from Last 3 Encounters:   20 170 lb (77.1 kg)   20 177 lb (80.3 kg)   19 179 lb (81.2 kg)     · Constitutional: Oriented. No distress. · Head: Normocephalic and atraumatic. · Mouth/Throat: Oropharynx is clear and moist.   · Eyes: Conjunctivae normal. EOM are normal.   · Neck: Neck supple. No JVD present. · Cardiovascular: Normal rate, regular rhythm, S1&S2. · Pulmonary/Chest: Bilateral respiratory sounds. No rhonchi. · Abdominal: Soft. No tenderness. · Musculoskeletal: No tenderness. No edema    · Lymphadenopathy: Has no cervical adenopathy. · Neurological: Alert and oriented. Follows command, No Gross deficit   · Skin: Skin is warm, No rash noted. · Psychiatric: Has a normal behavior       Labs, diagnostic and imaging results reviewed. Reviewed.    Lab Results   Component Value Date    TSH 2.54 2014    TSH 3.37 10/14/2011    TSH 2.36 10/06/2010    CREATININE 0.9 2019    CREATININE 0.9 10/23/2019    AST 17 2020    ALT 29 2020     EC20   Sinus Rhythm    48 hour Holter: 4/4-4/7/19  Sinus rhythm/sinus delroy  Brief PAT  High hr 147  Low HR 46  Average HR 61    Echo:  8/6/18   Summary   -Normal left ventricle size, wall thickness and systolic function with an   estimated ejection fraction of 55-60%.  -No regional wall motion abnormalities are seen.   -Mild-moderate aortic regurgitation.   -Mild mitral regurgitation.   -Mild tricuspid regurgitation with a estimated RVSP of 43 mmHg.   -Moderate pulmonic regurgitation present.   -The right ventricle is borderline enlarged.   -A bubble study was performed and fails to show evidence of shunting.   -IVC size is normal (<2.1 cm) but collapses < 50% with respiration.   -Normal diastolic function. BRI: 12/12/18  Summary   Normal left ventricle size, wall thickness, and systolic function with an   estimated ejection fraction of 55-60%. No regional wall motion abnormalities   are seen.    Mild mitral valve leaflet thickening. There is mild mitral regurgitation.    Mild to moderate aortic regurgitation.     BRI:  8/7/18   Summary   -Normal left ventricle size, wall thickness, and systolic function with an   estimated ejection fraction of 55-60%.  -The mitral valve has non specific thickening. There is mild-moderate mitral   regurgitation with multiple jets.   -Mildly dilated left atrium.   -There is no evidence of mass or thrombus in the left atrium or appendage.   -There is mild aortic sclerosis.   -Mild aortic regurgitation.   -There is mild plaque in the thoracic aorta.   -A bubble study was performed and fails to show evidence of shunting.   -No cardiac source of emboli detected. Nuclear stress:  10/2/18  Summary    Normal myocardial perfusion.    Normal LV size and systolic function.    Rapid atrial fib with exercise and 2 mm ST depression.  Asymptomatic.         Cath:  none    Medication:  Current Outpatient Medications   Medication Sig Dispense Refill    amLODIPine (NORVASC) 10 MG tablet TAKE ONE TABLET BY MOUTH DAILY 90 tablet 3  lisinopril (PRINIVIL;ZESTRIL) 40 MG tablet TAKE ONE TABLET BY MOUTH DAILY 90 tablet 2    rivaroxaban (XARELTO) 20 MG TABS tablet Take 1 tablet by mouth daily (with breakfast) 90 tablet 3    atenolol (TENORMIN) 50 MG tablet TAKE ONE TABLET BY MOUTH DAILY 90 tablet 3    venlafaxine (EFFEXOR) 75 MG tablet TAKE ONE TABLET BY MOUTH DAILY (Patient taking differently: Take 37.5 mg by mouth daily ) 90 tablet 2    atorvastatin (LIPITOR) 40 MG tablet TAKE ONE TABLET BY MOUTH DAILY 90 tablet 3    allopurinol (ZYLOPRIM) 100 MG tablet TAKE ONE TABLET BY MOUTH DAILY 90 tablet 3     No current facility-administered medications for this visit. Assessment and plan:     - Paroxysmal atrial fibrillation    12/12/18 PVI and additional ablation of CTI    Patient has no palpitation or chest pressure. Denies having a recurrence of his atrial fibrillation. Has remained in sinus rhythm. He has had occasional brief nosebleeding. We discussed risk-benefit alternative of anticoagulation with patient. He was not aware of his atrial fibrillation at the time of his stroke. High [id number] score and high risk for stroke and thromboembolism. Anticoagulation is recommended. Continue with Xarelto. CVA:    -Secondary to paroxysmal atrial fibrillation. Needs lifelong -anticoagulations therapy   On Xarelto. HTN  Vitals:    07/07/20 0734   BP: (!) 128/52   Pulse: 56     -Home BP monitoring encourage with a BP goal <130/80  -Norvasc 10 mg daily  -Atenolol 50 mg daily  -Lisinopril 40 mg daily    - The patient is counseled to follow a low salt diet to assure blood pressure remains controlled for cardiovascular risk factor modification.   - The patient is counseled to avoid excess caffeine, and energy drinks as this may exacerbated ectopy and arrhythmia. - The patient is counseled to get regular exercise 3-5 times per week to control cardiovascular risk factors.    - The patient is counseled to lose weigt to control cardiovascular risk factors. - The patient is counseled to avoid tobacco use. Thank you for allowing me to participate in the care of Juanpablo Bui. Further evaluation will be based upon the patient's clinical course and testing results. All questions and concerns were addressed to the patient/family. Alternatives to my treatment were discussed. I have discussed the above stated plan and the patient verbalized understanding and agreed with the plan. NOTE: This report was transcribed using voice recognition software. Every effort was made to ensure accuracy, however, inadvertent computerized transcription errors may be present. Audi Graham MD, MPH  [place] [address]   Office: [phone]     Scribe attestation: This note was scribed in the presence of Audi Graham MD by Rhae Fleischer, RN  Physician Attestation: I, Dr. Audi Graham, confirm that the scribe's documentation has been prepared under my direction and personally reviewed by me in its entirety. I also confirm that the note above accurately reflects all work, treatment, procedures, and medical decision making performed by me.

## 2020-07-07 ENCOUNTER — OFFICE VISIT (OUTPATIENT)
Dept: CARDIOLOGY CLINIC | Age: 70
End: 2020-07-07
Payer: MEDICARE

## 2020-07-07 VITALS
DIASTOLIC BLOOD PRESSURE: 52 MMHG | HEART RATE: 56 BPM | BODY MASS INDEX: 25.18 KG/M2 | SYSTOLIC BLOOD PRESSURE: 128 MMHG | WEIGHT: 170 LBS | HEIGHT: 69 IN

## 2020-07-07 PROCEDURE — 93000 ELECTROCARDIOGRAM COMPLETE: CPT | Performed by: INTERNAL MEDICINE

## 2020-07-07 PROCEDURE — 1123F ACP DISCUSS/DSCN MKR DOCD: CPT | Performed by: INTERNAL MEDICINE

## 2020-07-07 PROCEDURE — 1036F TOBACCO NON-USER: CPT | Performed by: INTERNAL MEDICINE

## 2020-07-07 PROCEDURE — G8427 DOCREV CUR MEDS BY ELIG CLIN: HCPCS | Performed by: INTERNAL MEDICINE

## 2020-07-07 PROCEDURE — G8417 CALC BMI ABV UP PARAM F/U: HCPCS | Performed by: INTERNAL MEDICINE

## 2020-07-07 PROCEDURE — 99214 OFFICE O/P EST MOD 30 MIN: CPT | Performed by: INTERNAL MEDICINE

## 2020-07-07 PROCEDURE — 4040F PNEUMOC VAC/ADMIN/RCVD: CPT | Performed by: INTERNAL MEDICINE

## 2020-07-07 PROCEDURE — 3017F COLORECTAL CA SCREEN DOC REV: CPT | Performed by: INTERNAL MEDICINE

## 2020-08-31 ENCOUNTER — TELEPHONE (OUTPATIENT)
Dept: INTERNAL MEDICINE CLINIC | Age: 70
End: 2020-08-31

## 2020-08-31 NOTE — TELEPHONE ENCOUNTER
Pt called spoke with call center to make apt for physical.  He is having knee surgery 11/09   I called [facility] message for pt to call me back so we can get apt.with a apt.with NP

## 2020-10-09 ENCOUNTER — OFFICE VISIT (OUTPATIENT)
Dept: INTERNAL MEDICINE CLINIC | Age: 70
End: 2020-10-09
Payer: MEDICARE

## 2020-10-09 VITALS
TEMPERATURE: 97.1 F | HEART RATE: 64 BPM | BODY MASS INDEX: 26.22 KG/M2 | DIASTOLIC BLOOD PRESSURE: 60 MMHG | OXYGEN SATURATION: 99 % | WEIGHT: 177 LBS | SYSTOLIC BLOOD PRESSURE: 140 MMHG | HEIGHT: 69 IN

## 2020-10-09 PROCEDURE — G8484 FLU IMMUNIZE NO ADMIN: HCPCS | Performed by: INTERNAL MEDICINE

## 2020-10-09 PROCEDURE — G8417 CALC BMI ABV UP PARAM F/U: HCPCS | Performed by: INTERNAL MEDICINE

## 2020-10-09 PROCEDURE — 3017F COLORECTAL CA SCREEN DOC REV: CPT | Performed by: INTERNAL MEDICINE

## 2020-10-09 PROCEDURE — 90694 VACC AIIV4 NO PRSRV 0.5ML IM: CPT | Performed by: INTERNAL MEDICINE

## 2020-10-09 PROCEDURE — G8427 DOCREV CUR MEDS BY ELIG CLIN: HCPCS | Performed by: INTERNAL MEDICINE

## 2020-10-09 PROCEDURE — 4040F PNEUMOC VAC/ADMIN/RCVD: CPT | Performed by: INTERNAL MEDICINE

## 2020-10-09 PROCEDURE — 1123F ACP DISCUSS/DSCN MKR DOCD: CPT | Performed by: INTERNAL MEDICINE

## 2020-10-09 PROCEDURE — G0008 ADMIN INFLUENZA VIRUS VAC: HCPCS | Performed by: INTERNAL MEDICINE

## 2020-10-09 PROCEDURE — 1036F TOBACCO NON-USER: CPT | Performed by: INTERNAL MEDICINE

## 2020-10-09 PROCEDURE — 99214 OFFICE O/P EST MOD 30 MIN: CPT | Performed by: INTERNAL MEDICINE

## 2020-10-09 NOTE — PROGRESS NOTES
Chief Complaint   Patient presents with    Pre-op Exam     left knee replacemenrt 11/9/20  at [address] 72 y.o. male is here for follow-up of hypertension. The patient is had a previous cerebrovascular accident and has largely recovered from this    He has a prior history of full dose anticoagulation, he is currently taking Xarelto, he continues under the care of the cardiologist    He had a previous consultation with a cardiologist regarding preoperative preparation and was advised to stop his Xarelto 3 days before the procedure    Today the patient complains of refractory pain in his knees bilaterally. He is extremely pleased with his previous response to right total knee replacement and is quite anxious to get the left knee done since if anything his symptoms are worse on the left. Review of systems no previous anesthesia complications either personally or family history of anesthesia complications    No paroxysmal nocturnal dyspnea, orthopnea, fever chills or night sweats         Current Outpatient Medications on File Prior to Visit   Medication Sig Dispense Refill    amLODIPine (NORVASC) 10 MG tablet TAKE ONE TABLET BY MOUTH DAILY 90 tablet 3    lisinopril (PRINIVIL;ZESTRIL) 40 MG tablet TAKE ONE TABLET BY MOUTH DAILY 90 tablet 2    rivaroxaban (XARELTO) 20 MG TABS tablet Take 1 tablet by mouth daily (with breakfast) 90 tablet 3    atenolol (TENORMIN) 50 MG tablet TAKE ONE TABLET BY MOUTH DAILY 90 tablet 3    venlafaxine (EFFEXOR) 75 MG tablet TAKE ONE TABLET BY MOUTH DAILY (Patient taking differently: Take 37.5 mg by mouth daily ) 90 tablet 2    atorvastatin (LIPITOR) 40 MG tablet TAKE ONE TABLET BY MOUTH DAILY 90 tablet 3    allopurinol (ZYLOPRIM) 100 MG tablet TAKE ONE TABLET BY MOUTH DAILY 90 tablet 3     No current facility-administered medications on file prior to visit.         Past Medical History:   Diagnosis Date    Atrial fibrillation ([facility]     Cerebral artery occlusion with cerebral infarction (HCC)     Hyperlipidemia     Hypertension     Sleep apnea     uses c-pap           BP (!) 140/60   Pulse 64   Temp 97.1 °F (36.2 °C)   Ht 5' 9\" (1.753 m)   Wt 177 lb (80.3 kg)   SpO2 99%   BMI 26.14 kg/m²     General Appearance:  Alert, cooperative, no distress, appears stated age   Head:  Normocephalic, without obvious abnormality, atraumatic   Neck: Supple, symmetrical, trachea midline, no adenopathy, thyroid: not enlarged, symmetric, no tenderness/mass/nodules, no carotid bruit or JVD   Lungs:   Clear to auscultation bilaterally, respirations unlabored   Chest Wall:  No tenderness or deformity   Heart:  Regular rate and rhythm, S1, S2 normal, no murmur, rub or gallop   Abdomen:   Soft, non-tender, bowel sounds active all four quadrants,  no masses, no organomegaly   Skin: Skin color, texture, turgor normal, no rashes or lesions   Lymph nodes:    Musculoskeletal Cervical, supraclavicular  Adenopathy is absent    Status post right total knee         No components found for: CHLPL  Lab Results   Component Value Date    TRIG 168 (H) 10/23/2019    TRIG 370 (H) 08/06/2018    TRIG 513 (H) 10/19/2017     Lab Results   Component Value Date    HDL 46 10/23/2019    HDL 32 (L) 08/06/2018    HDL 38 (L) 10/19/2017     Lab Results   Component Value Date    LDLCALC 62 10/23/2019    [address] see below 08/06/2018    LDLCALC see below 10/19/2017     Lab Results   Component Value Date    LABVLDL 34 10/23/2019    LABVLDL see below 08/06/2018    LABVLDL see below 10/19/2017     Lab Results   Component Value Date    CREATININE 0.9 11/13/2019         ASSESSMENT/PLAN[de-identified]    He is a good surgical candidate for knee surgery      His hypertension is controlled. In apparent sinus rhythm    Suitable candidate for total knee replacement    He continues on full dose anticoagulation therapy, he was advised previously by the cardiologist to stop this 3 days prior to the procedure.   This was again reviewed with him today. Diagnosis Orders   1. Cerebrovascular accident (CVA), unspecified mechanism ([facility]     2. Need for influenza vaccination  INFLUENZA, QUADV, ADJUVANTED, 65 YRS =, IM, PF, PREFILL SYR, 0.5ML (FLUAD)   3. Gout of multiple sites, unspecified cause, unspecified chronicity  Uric Acid   4. PAF (paroxysmal atrial fibrillation) ([facility]     5.  Preoperative general physical examination

## 2020-10-22 ENCOUNTER — NURSE TRIAGE (OUTPATIENT)
Dept: OTHER | Facility: CLINIC | Age: 70
End: 2020-10-22

## 2020-10-22 NOTE — TELEPHONE ENCOUNTER
Reason for Disposition   Caller has medication question only, adult not sick, and triager answers question    Answer Assessment - Initial Assessment Questions  1. NAME of MEDICATION: \"What medicine are you calling about? \"      Advil and Tylenol     2. QUESTION: Esther Martínez is your question? \"      What is the max does of Tylenol per day, he is going camping this weekend and wanted to know just in case      3. PRESCRIBING HCP: \"Who prescribed it? \" Reason: if prescribed by specialist, call should be referred to that group. [place]    4. SYMPTOMS: \"Do you have any symptoms? \"      None    5. SEVERITY: If symptoms are present, ask \"Are they mild, moderate or severe? \"      None    6. PREGNANCY:  \"Is there any chance that you are pregnant? \" \"When was your last menstrual period? \"      None    Protocols used: MEDICATION QUESTION CALL-ADULT-[place]    Caller provided care advice and instructed to call back with worsening symptoms. Pt given national dosing for tylenol and instructed to speak to the pharmacist about his current medication and pain medication    Attention Provider: Thank you for allowing me to participate in the care of your patient. The patient was connected to triage in response to information provided to the [facility]. Please do not respond through this encounter as the response is not directed to a shared pool.

## 2020-10-27 ENCOUNTER — TELEPHONE (OUTPATIENT)
Dept: CARDIOLOGY CLINIC | Age: 70
End: 2020-10-27

## 2020-10-27 NOTE — TELEPHONE ENCOUNTER
Assessment and plan:      - Paroxysmal atrial fibrillation               12/12/18 PVI and additional ablation of CTI               Patient has no palpitation or chest pressure. Denies having a recurrence of his atrial fibrillation. Has remained in sinus rhythm.                 He has had occasional brief nosebleeding. We discussed risk-benefit alternative of anticoagulation with patient. He was not aware of his atrial fibrillation at the time of his stroke. High [id number] score and high risk for stroke and thromboembolism. Anticoagulation is recommended. Continue with Xarelto. CVA:               -Secondary to paroxysmal atrial fibrillation.   Needs lifelong -anticoagulations therapy              On Xarelto.

## 2020-10-27 NOTE — TELEPHONE ENCOUNTER
OK to hold xarelto 2 days prior to surgery, does his surgeon need letter faxed?     Bety Medeiros, APRN-CNP

## 2020-10-27 NOTE — TELEPHONE ENCOUNTER
Left a message with instructions per Dr. Danita Mejia. Asked to call back if this needs to be in writing, for the surgeon. Will need his/her name, and fax number.

## 2020-12-10 RX ORDER — ATORVASTATIN CALCIUM 40 MG/1
TABLET, FILM COATED ORAL
Qty: 90 TABLET | Refills: 2 | Status: SHIPPED | OUTPATIENT
Start: 2020-12-10 | End: 2021-08-17 | Stop reason: SDUPTHER

## 2020-12-10 RX ORDER — ALLOPURINOL 100 MG/1
TABLET ORAL
Qty: 90 TABLET | Refills: 2 | Status: SHIPPED | OUTPATIENT
Start: 2020-12-10 | End: 2021-09-13 | Stop reason: SDUPTHER

## 2021-01-05 ENCOUNTER — TELEPHONE (OUTPATIENT)
Dept: INTERNAL MEDICINE CLINIC | Age: 71
End: 2021-01-05

## 2021-01-05 RX ORDER — ATENOLOL 50 MG/1
50 TABLET ORAL DAILY
Qty: 90 TABLET | Refills: 3 | Status: SHIPPED | OUTPATIENT
Start: 2021-01-05 | End: 2022-01-07 | Stop reason: SDUPTHER

## 2021-01-05 NOTE — TELEPHONE ENCOUNTER
Called pt and explained we needed to schedule a new pt appt in order to fill rx     Gave appt   Refill sent under Todd's name

## 2021-01-05 NOTE — TELEPHONE ENCOUNTER
Pt called back stating he was returning Gina's call. He would like for Parul Hightower to take over his care since Dr. Scottie Adamson has retired and to let her know. He declined to make an appt.

## 2021-02-02 DIAGNOSIS — I48.0 PAROXYSMAL ATRIAL FIBRILLATION (HCC): ICD-10-CM

## 2021-02-03 RX ORDER — RIVAROXABAN 20 MG/1
TABLET, FILM COATED ORAL
Qty: 90 TABLET | Refills: 2 | Status: SHIPPED | OUTPATIENT
Start: 2021-02-03 | End: 2021-10-15 | Stop reason: SDUPTHER

## 2021-02-03 NOTE — TELEPHONE ENCOUNTER
Received refill request for xarelto from [address] [place] [address].     Last ov:7/7/2020 RMM    Last Refill:2/4/2020 #90 with 3 refills    Next appointment:on recall list for [facility]

## 2021-03-22 ENCOUNTER — OFFICE VISIT (OUTPATIENT)
Dept: PRIMARY CARE CLINIC | Age: 71
End: 2021-03-22
Payer: MEDICARE

## 2021-03-22 DIAGNOSIS — Z20.828 EXPOSURE TO SARS-ASSOCIATED CORONAVIRUS: Primary | ICD-10-CM

## 2021-03-22 PROCEDURE — 99211 OFF/OP EST MAY X REQ PHY/QHP: CPT | Performed by: NURSE PRACTITIONER

## 2021-03-22 PROCEDURE — G8417 CALC BMI ABV UP PARAM F/U: HCPCS | Performed by: NURSE PRACTITIONER

## 2021-03-22 PROCEDURE — G8428 CUR MEDS NOT DOCUMENT: HCPCS | Performed by: NURSE PRACTITIONER

## 2021-03-23 LAB — SARS-COV-2: NOT DETECTED

## 2021-03-25 ENCOUNTER — OFFICE VISIT (OUTPATIENT)
Dept: PRIMARY CARE CLINIC | Age: 71
End: 2021-03-25
Payer: MEDICARE

## 2021-03-25 DIAGNOSIS — Z20.828 EXPOSURE TO SARS-ASSOCIATED CORONAVIRUS: Primary | ICD-10-CM

## 2021-03-25 PROCEDURE — G8417 CALC BMI ABV UP PARAM F/U: HCPCS | Performed by: NURSE PRACTITIONER

## 2021-03-25 PROCEDURE — G8428 CUR MEDS NOT DOCUMENT: HCPCS | Performed by: NURSE PRACTITIONER

## 2021-03-25 PROCEDURE — 99211 OFF/OP EST MAY X REQ PHY/QHP: CPT | Performed by: NURSE PRACTITIONER

## 2021-03-26 LAB — SARS-COV-2: NOT DETECTED

## 2021-03-26 NOTE — PATIENT INSTRUCTIONS

## 2021-05-03 RX ORDER — AMLODIPINE BESYLATE 10 MG/1
TABLET ORAL
Qty: 90 TABLET | Refills: 1 | Status: SHIPPED | OUTPATIENT
Start: 2021-05-03 | End: 2021-11-12

## 2021-05-03 NOTE — TELEPHONE ENCOUNTER
[facility]     Last ov:7/7/2020 RMM    Last Refill: 5/18/2020 #90 with 3 refills     Next appointment:on recall list with [facility]

## 2021-05-11 ENCOUNTER — OFFICE VISIT (OUTPATIENT)
Dept: INTERNAL MEDICINE CLINIC | Age: 71
End: 2021-05-11
Payer: MEDICARE

## 2021-05-11 VITALS
SYSTOLIC BLOOD PRESSURE: 120 MMHG | BODY MASS INDEX: 26.22 KG/M2 | DIASTOLIC BLOOD PRESSURE: 60 MMHG | OXYGEN SATURATION: 96 % | HEART RATE: 56 BPM | WEIGHT: 177 LBS | HEIGHT: 69 IN

## 2021-05-11 DIAGNOSIS — I48.0 PAROXYSMAL ATRIAL FIBRILLATION (HCC): ICD-10-CM

## 2021-05-11 DIAGNOSIS — R73.9 HYPERGLYCEMIA: ICD-10-CM

## 2021-05-11 DIAGNOSIS — I63.431 CEREBRAL INFARCTION DUE TO EMBOLISM OF RIGHT POSTERIOR CEREBRAL ARTERY (HCC): ICD-10-CM

## 2021-05-11 DIAGNOSIS — Z11.59 NEED FOR HEPATITIS C SCREENING TEST: ICD-10-CM

## 2021-05-11 DIAGNOSIS — I10 ESSENTIAL HYPERTENSION: ICD-10-CM

## 2021-05-11 DIAGNOSIS — Z12.5 PROSTATE CANCER SCREENING: ICD-10-CM

## 2021-05-11 DIAGNOSIS — E78.2 MIXED HYPERLIPIDEMIA: ICD-10-CM

## 2021-05-11 DIAGNOSIS — I49.5 TACHY-BRADY SYNDROME (HCC): ICD-10-CM

## 2021-05-11 DIAGNOSIS — Z00.00 ROUTINE GENERAL MEDICAL EXAMINATION AT A HEALTH CARE FACILITY: Primary | ICD-10-CM

## 2021-05-11 DIAGNOSIS — H91.93 DECREASED HEARING OF BOTH EARS: ICD-10-CM

## 2021-05-11 DIAGNOSIS — M10.9 GOUT OF MULTIPLE SITES, UNSPECIFIED CAUSE, UNSPECIFIED CHRONICITY: ICD-10-CM

## 2021-05-11 LAB
A/G RATIO: 1.8 (ref 1.1–2.2)
ALBUMIN SERPL-MCNC: 4.6 G/DL (ref 3.4–5)
ALP BLD-CCNC: 103 U/L (ref 40–129)
ALT SERPL-CCNC: 20 U/L (ref 10–40)
ANION GAP SERPL CALCULATED.3IONS-SCNC: 12 MMOL/L (ref 3–16)
AST SERPL-CCNC: 18 U/L (ref 15–37)
BILIRUB SERPL-MCNC: 0.5 MG/DL (ref 0–1)
BUN BLDV-MCNC: 17 MG/DL (ref 7–20)
CALCIUM SERPL-MCNC: 9.3 MG/DL (ref 8.3–10.6)
CHLORIDE BLD-SCNC: 104 MMOL/L (ref 99–110)
CHOLESTEROL, TOTAL: 148 MG/DL (ref 0–199)
CO2: 26 MMOL/L (ref 21–32)
CREAT SERPL-MCNC: 1 MG/DL (ref 0.8–1.3)
GFR AFRICAN AMERICAN: >60
GFR NON-AFRICAN AMERICAN: >60
GLOBULIN: 2.6 G/DL
GLUCOSE BLD-MCNC: 97 MG/DL (ref 70–99)
HDLC SERPL-MCNC: 48 MG/DL (ref 40–60)
HEPATITIS C ANTIBODY INTERPRETATION: NORMAL
LDL CHOLESTEROL CALCULATED: 62 MG/DL
POTASSIUM SERPL-SCNC: 4.7 MMOL/L (ref 3.5–5.1)
PROSTATE SPECIFIC ANTIGEN: 1.25 NG/ML (ref 0–4)
SODIUM BLD-SCNC: 142 MMOL/L (ref 136–145)
TOTAL PROTEIN: 7.2 G/DL (ref 6.4–8.2)
TRIGL SERPL-MCNC: 192 MG/DL (ref 0–150)
VLDLC SERPL CALC-MCNC: 38 MG/DL

## 2021-05-11 PROCEDURE — 3017F COLORECTAL CA SCREEN DOC REV: CPT | Performed by: NURSE PRACTITIONER

## 2021-05-11 PROCEDURE — 4040F PNEUMOC VAC/ADMIN/RCVD: CPT | Performed by: NURSE PRACTITIONER

## 2021-05-11 PROCEDURE — G0438 PPPS, INITIAL VISIT: HCPCS | Performed by: NURSE PRACTITIONER

## 2021-05-11 PROCEDURE — 1123F ACP DISCUSS/DSCN MKR DOCD: CPT | Performed by: NURSE PRACTITIONER

## 2021-05-11 ASSESSMENT — PATIENT HEALTH QUESTIONNAIRE - PHQ9
SUM OF ALL RESPONSES TO PHQ QUESTIONS 1-9: 0
SUM OF ALL RESPONSES TO PHQ QUESTIONS 1-9: 0
2. FEELING DOWN, DEPRESSED OR HOPELESS: 0
1. LITTLE INTEREST OR PLEASURE IN DOING THINGS: 0

## 2021-05-11 ASSESSMENT — LIFESTYLE VARIABLES
HOW OFTEN DURING THE LAST YEAR HAVE YOU FOUND THAT YOU WERE NOT ABLE TO STOP DRINKING ONCE YOU HAD STARTED: 0
AUDIT-C TOTAL SCORE: 4
HAVE YOU OR SOMEONE ELSE BEEN INJURED AS A RESULT OF YOUR DRINKING: 0
HOW OFTEN DO YOU HAVE SIX OR MORE DRINKS ON ONE OCCASION: 0
HOW MANY STANDARD DRINKS CONTAINING ALCOHOL DO YOU HAVE ON A TYPICAL DAY: 0
HOW OFTEN DURING THE LAST YEAR HAVE YOU HAD A FEELING OF GUILT OR REMORSE AFTER DRINKING: 0

## 2021-05-11 NOTE — PATIENT INSTRUCTIONS
Personalized Preventive Plan for Olya Zhang - 5/11/2021  Medicare offers a range of preventive health benefits. Some of the tests and screenings are paid in full while other may be subject to a deductible, co-insurance, and/or copay. Some of these benefits include a comprehensive review of your medical history including lifestyle, illnesses that may run in your family, and various assessments and screenings as appropriate. After reviewing your medical record and screening and assessments performed today your provider may have ordered immunizations, labs, imaging, and/or referrals for you. A list of these orders (if applicable) as well as your Preventive Care list are included within your After Visit Summary for your review. Other Preventive Recommendations:    · A preventive eye exam performed by an eye specialist is recommended every 1-2 years to screen for glaucoma; cataracts, macular degeneration, and other eye disorders. · A preventive dental visit is recommended every 6 months. · Try to get at least 150 minutes of exercise per week or 10,000 steps per day on a pedometer . · Order or download the FREE \"Exercise & Physical Activity: Your Everyday Guide\" from [employer]. Call 9-588.374.1771 or search [employer] online. · You need 8770-5377 mg of calcium and 8072-4697 IU of vitamin D per day. It is possible to meet your calcium requirement with diet alone, but a vitamin D supplement is usually necessary to meet this goal.  · When exposed to the sun, use a sunscreen that protects against both UVA and UVB radiation with an SPF of 30 or greater. Reapply every 2 to 3 hours or after sweating, drying off with a towel, or swimming. · Always wear a seat belt when traveling in a car. Always wear a helmet when riding a bicycle or motorcycle.

## 2021-05-11 NOTE — PROGRESS NOTES
Pressure Father     Coronary Art Dis Brother        [facility] (Including outside providers/suppliers regularly involved in providing care):   Patient Care Team:  AMANDO Soares CNP as PCP - General (Family Nurse Practitioner)  AMANDO Soares CNP as PCP - [facility] Empaneled Provider  Sandee Stewart MD as Surgeon (General Surgery)  Sapphire Machado MD as Consulting Physician (Gastroenterology)    Wt Readings from Last 3 Encounters:   05/11/21 177 lb (80.3 kg)   10/09/20 177 lb (80.3 kg)   07/07/20 170 lb (77.1 kg)     Vitals:    05/11/21 0813   BP: 120/60   Pulse: 56   SpO2: 96%   Weight: 177 lb (80.3 kg)   Height: 5' 9\" (1.753 m)     Body mass index is 26.14 kg/m². Based upon direct observation of the patient, evaluation of cognition reveals recent and remote memory intact. Patient's complete Health Risk Assessment and screening values have been reviewed and are found in Flowsheets. The following problems were reviewed today and where indicated follow up appointments were made and/or referrals ordered.     Positive Risk Factor Screenings with Interventions:           Health Habits/Nutrition:  Health Habits/Nutrition  Do you exercise for at least 20 minutes 2-3 times per week?: (!) No  Have you lost any weight without trying in the past 3 months?: No  Do you eat only one meal per day?: No  Have you seen the dentist within the past year?: (!) No  Body mass index: (!) 26.14  Health Habits/Nutrition Interventions:  · Inadequate physical activity:  patient is not ready to increase his/her physical activity level at this time  · Dental exam overdue:  patient encouraged to make appointment with his/her dentist    Hearing/Vision:  No exam data present  Hearing/Vision  Do you or your family notice any trouble with your hearing that hasn't been managed with hearing aids?: (!) Yes  Do you have difficulty driving, watching TV, or doing any of your daily activities because of your eyesight?: No Have you had an eye exam within the past year?: (!) No  Hearing/Vision Interventions:  · Hearing concerns:  audiology referral provided  · Vision concerns:  patient encouraged to make appointment with his/her eye specialist      Personalized Preventive Plan   Current Health Maintenance Status  Immunization History   Administered Date(s) Administered    COVID-19, Moderna, PF, 100mcg/0.5mL 03/02/2021, 03/31/2021    Influenza Virus Vaccine 12/09/2014    Influenza, High Dose (Fluzone 65 yrs and older) 10/04/2018    Influenza, Quadv, adjuvanted, 65 yrs +, IM, PF (Fluad) 10/09/2020    Influenza, Triv, inactivated, subunit, adjuvanted, IM (Fluad 65 yrs and older) 10/23/2019    Pneumococcal Conjugate 13-valent ([id number]) 10/23/2016    Pneumococcal Polysaccharide ([id number]) 10/04/2018        Health Maintenance   Topic Date Due    Hepatitis C screen  Never done    DTaP/Tdap/Td vaccine (1 - Tdap) Never done    Shingles Vaccine (1 of 2) Never done    A1C test (Diabetic or Prediabetic)  10/19/2018    Lipid screen  10/23/2020    Annual Wellness Visit (AWV)  10/23/2021 (Originally 5/29/2019)    Colon cancer screen colonoscopy  09/15/2021    Potassium monitoring  11/10/2021    Creatinine monitoring  11/10/2021    Flu vaccine  Completed    Pneumococcal 65+ years Vaccine  Completed    COVID-19 Vaccine  Completed    Hepatitis A vaccine  Aged Out    Hepatitis B vaccine  Aged Out    Hib vaccine  Aged Out    Meningococcal (ACWY) vaccine  Aged Out     Recommendations for [place] Due: see orders and patient instructions/AVS.  . Recommended screening schedule for the next 5-10 years is provided to the patient in written form: see Patient Instructions/AVS.          Assessment/Plan:  Isha Constantino was seen today for medicare awv. Diagnoses and all orders for this visit:    Routine general medical examination at a health care facility    Essential hypertension  - Normotensive  - he has met JNC standards.   - Continue current regimen.  -     LIPID PANEL; Future  -     Hemoglobin A1C; Future  -     COMPREHENSIVE METABOLIC PANEL; Future    Mixed hyperlipidemia  -     LIPID PANEL; Future  -     Hemoglobin A1C; Future  -     COMPREHENSIVE METABOLIC PANEL; Future    Paroxysmal atrial fibrillation (HCC)  -     COMPREHENSIVE METABOLIC PANEL; Future    Tachy-delroy syndrome (HCC)  -History of tachybradycardia syndrome. He is a patient of cardiology  -Continue current regimen    Gout of multiple sites, unspecified cause, unspecified chronicity  -History of gout. He is on allopurinol  -No recent exacerbation. 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

## 2021-05-12 LAB
ESTIMATED AVERAGE GLUCOSE: 116.9 MG/DL
HBA1C MFR BLD: 5.7 %

## 2021-06-07 ENCOUNTER — OFFICE VISIT (OUTPATIENT)
Dept: ENT CLINIC | Age: 71
End: 2021-06-07
Payer: MEDICARE

## 2021-06-07 ENCOUNTER — PROCEDURE VISIT (OUTPATIENT)
Dept: AUDIOLOGY | Age: 71
End: 2021-06-07
Payer: MEDICARE

## 2021-06-07 VITALS
BODY MASS INDEX: 26.66 KG/M2 | HEART RATE: 52 BPM | OXYGEN SATURATION: 98 % | WEIGHT: 180 LBS | DIASTOLIC BLOOD PRESSURE: 57 MMHG | HEIGHT: 69 IN | SYSTOLIC BLOOD PRESSURE: 156 MMHG

## 2021-06-07 DIAGNOSIS — R04.0 EPISTAXIS: ICD-10-CM

## 2021-06-07 DIAGNOSIS — H90.3 SENSORINEURAL HEARING LOSS (SNHL) OF BOTH EARS: Primary | ICD-10-CM

## 2021-06-07 DIAGNOSIS — H93.13 TINNITUS OF BOTH EARS: ICD-10-CM

## 2021-06-07 PROCEDURE — 99204 OFFICE O/P NEW MOD 45 MIN: CPT | Performed by: STUDENT IN AN ORGANIZED HEALTH CARE EDUCATION/TRAINING PROGRAM

## 2021-06-07 PROCEDURE — G8417 CALC BMI ABV UP PARAM F/U: HCPCS | Performed by: STUDENT IN AN ORGANIZED HEALTH CARE EDUCATION/TRAINING PROGRAM

## 2021-06-07 PROCEDURE — 4040F PNEUMOC VAC/ADMIN/RCVD: CPT | Performed by: STUDENT IN AN ORGANIZED HEALTH CARE EDUCATION/TRAINING PROGRAM

## 2021-06-07 PROCEDURE — 1123F ACP DISCUSS/DSCN MKR DOCD: CPT | Performed by: STUDENT IN AN ORGANIZED HEALTH CARE EDUCATION/TRAINING PROGRAM

## 2021-06-07 PROCEDURE — 3017F COLORECTAL CA SCREEN DOC REV: CPT | Performed by: STUDENT IN AN ORGANIZED HEALTH CARE EDUCATION/TRAINING PROGRAM

## 2021-06-07 PROCEDURE — 92557 COMPREHENSIVE HEARING TEST: CPT | Performed by: AUDIOLOGIST

## 2021-06-07 PROCEDURE — G8427 DOCREV CUR MEDS BY ELIG CLIN: HCPCS | Performed by: STUDENT IN AN ORGANIZED HEALTH CARE EDUCATION/TRAINING PROGRAM

## 2021-06-07 PROCEDURE — 1036F TOBACCO NON-USER: CPT | Performed by: STUDENT IN AN ORGANIZED HEALTH CARE EDUCATION/TRAINING PROGRAM

## 2021-06-07 PROCEDURE — 92567 TYMPANOMETRY: CPT | Performed by: AUDIOLOGIST

## 2021-06-07 NOTE — PROGRESS NOTES
[facility]  Division of Audiology/Otolaryngology    6/7/2021     PCP: AMANDO Soares - CNP   MRN: [id number]     AUDIOLOGIC AND OTHER PERTINENT MEDICAL HISTORY:        Mr. Florencio Montano was seen for hearing and middle ear evaluation. Otolaryngology is referral source of today's appointment. Patient presents with hearing concerns. He reports noticeable increased hearing problems, with spouse and grandchildren. AUDIOGRAM/IMMITTANCE (MIDDLE EAR FUNCTION):      Audiogram demonstrates mild-severe sensory loss of both ears. Loss is symmetrical. Speech discrimination ability was fair in right ear, good in left in quiet, at elevated levels. Immittance consistent with normal middle ear function (Type A curve)  bilaterally. Ipsilateral acoustic reflexes elevated or absent at isolated frequencies, bilaterally. Chart cc'd to: Rome Nissen, D.O    COUNSELING:          Audiogram results were reviewed with patient. RECOMMENDATIONS:         Return on annual basis to monitor hearing levels  ENT to medically advise  Patient expressed interest in amplification. We will consult following physician medial clearance.     Electronically signed by Ta Madsen on 6/7/21 at 3:03 PM.

## 2021-06-07 NOTE — PATIENT INSTRUCTIONS
DR. Kee Melton NOSEBLEED INSTRUCTIONS:    - Obtain nasal saline spray over the counter. Spray nasal saline multiple times a day (at least 5 times throughout the day) in each nostril to help with nasal mucosal moisturization for the next 3 weeks. You can get this over the counter. You cannot over use this!  - Place antibiotic ointment (I.e. triple antiobiotic ointment, bactroban) to inside of both nostrils and along the nasal septum twice daily for next 14 days. - Consider humidification machine in the bedroom to help with nasal moisturization  - Avoid nasal trauma nose picking, harsh nasal blowing, rubbing nose after blowing! If you need to blow your nose blow very gently and do not harshly wipe nose afterwards. - In case of another nosebleed: First spray AFRIN (can obtain over the counter) in affected nostril, then saturate a cottonball with Afrin and place it on the affected side and then placing unrelenting digital pressure for at least 15 minutes. After this take the cottonball allowed and reinspected. If still bleeding please repeat. If Bleeding does not stop after 1 hour or you lose a large amount of blood go to emergency department.

## 2021-06-07 NOTE — PROGRESS NOTES
[address] [facility]  NEW PATIENT HISTORY AND PHYSICAL NOTE      Patient Name: [address] Record Number:  [id number]  Primary Care Physician:  Gifty Moelleran, AMANDO - SANDIP    ChiefComplaint     Chief Complaint   Patient presents with    Epistaxis    Hearing Problem       History of Present Illness     Olya Zhang is an 80 y.o. male presenting with hearing loss. Hearing loss x 15 years, gradual. Occasional tinnitus, non-pulsatile. No otalgia. No otorrhea. No history of chronic ear infections. No history of otologic surgery. No family history of early onset hearing loss. No loud noise exposures. Denies exposure to chemotherapy or long-term IV antibiotics. Denies vertigo or dizziness. Nosebleeds started after being started on xeralto in Aug 2018 after he had a stroke. Left nostril only. Last bleed approximately 4 days ago. Occurs approximately once a month. Will stick tissues up his nose and stops after a few minutes. No nasal spray use. No humidifier use. Was worse during the winter. Past Medical History     Past Medical History:   Diagnosis Date    Atrial fibrillation ([address]     Cerebral artery occlusion with cerebral infarction ([address]     Hyperlipidemia     Hypertension     Sleep apnea     uses c-pap       Past Surgical History     Past Surgical History:   Procedure Laterality Date    APPENDECTOMY      ATRIAL ABLATION SURGERY  12/12/2018    Performed by Dr. Shawn Parry.      COLONOSCOPY  11/09    HEMICOLECTOMY  10/21/2016     OPEN RIGHT HEMICOLECTOMY                  REVISION TOTAL KNEE ARTHROPLASTY Right 06/11/2020    TONSILLECTOMY         Family History     Family History   Problem Relation Age of Onset    High Blood Pressure Mother     High Blood Pressure Father     Coronary Art Dis Brother        Social History     Social History     Tobacco Use    Smoking status: Never Smoker    Smokeless tobacco: Never Used   Vaping Use  Vaping Use: Never used   Substance Use Topics    Alcohol use: Yes     Comment: 3 weekly    Drug use: No        Allergies     No Known Allergies    Medications     Current Outpatient Medications   Medication Sig Dispense Refill    mupirocin (BACTROBAN) 2 % ointment Apply twice daily to inside of nostrils for the next 2 weeks then as needed for nasal dryness. 1 Tube 1    amLODIPine (NORVASC) 10 MG tablet TAKE ONE TABLET BY MOUTH DAILY 90 tablet 1    XARELTO 20 MG TABS tablet TAKE ONE TABLET BY MOUTH DAILY WITH BREAKFAST 90 tablet 2    atenolol (TENORMIN) 50 MG tablet Take 1 tablet by mouth daily 90 tablet 3    allopurinol (ZYLOPRIM) 100 MG tablet TAKE ONE TABLET BY MOUTH DAILY 90 tablet 2    atorvastatin (LIPITOR) 40 MG tablet TAKE ONE TABLET BY MOUTH DAILY 90 tablet 2    lisinopril (PRINIVIL;ZESTRIL) 40 MG tablet TAKE ONE TABLET BY MOUTH DAILY 90 tablet 2    venlafaxine (EFFEXOR) 75 MG tablet TAKE ONE TABLET BY MOUTH DAILY (Patient taking differently: Take 37.5 mg by mouth daily ) 90 tablet 2     No current facility-administered medications for this visit.        Review of Systems     REVIEW OF SYSTEMS  The following systems were reviewed and revealed the following in addition to any already discussed in the HPI:    CONSTITUTIONAL: no weight loss, no fever, no night sweats, no chills  EYES: no vision changes, no blurry vision  EARS: +changes in hearing, no otalgia  NOSE: +epistaxis, no rhinorrhea  THROAT: No voice changes, no sore throat, no dysphagia      PhysicalExam     Vitals:    06/07/21 1534 06/07/21 1536   BP: (!) 153/73 (!) 156/57   Pulse: 52    SpO2: 98%    Weight: 180 lb (81.6 kg)    Height: 5' 9\" (1.753 m)        PHYSICAL EXAM  BP (!) 156/57   Pulse 52   Ht 5' 9\" (1.753 m)   Wt 180 lb (81.6 kg)   SpO2 98%   BMI 26.58 kg/m²     GENERAL: No acute distress, alert and oriented, no hoarseness  EYES: EOMI, Anti-icteric  NOSE: On anterior rhinoscopy there is no epistaxis, nasal mucosa moist and normal appearing, no purulent drainage. EARS: Normal external appearance; on portable otomicroscopy:     -Ad: External auditory canal without stenosis, tympanic membrane clear, no middle ear effusions or retractions.      -As: External auditory canal without stenosis, tympanic membrane clear, no middle ear effusions or retractions. Pneumatic otoscopy: Bilateral tympanic membranes mobile pneumatic otoscopy  FACE: HB 1/6 bilaterally, symmetric appearing, sensation equal bilaterally  ORAL CAVITY: No masses or lesions palpated, uvula is midline, moist mucous membranes, absent tonsils  NECK: Normal range of motion, no thyromegaly, trachea is midline, no palpable lymphadenopathy or neck masses, no crepitus  NEURO: Cranial Nerves 2, 3, 4, 5, 6, 7, 11, 12 grossly intact bilaterally     I have performed a head and neck physical exam personally or was physically present during the key or critical portions of the service. Data/Imaging Review     Comprehensive audiological evaluation performed in the office today by Joshua BECK was independently reviewed by myself which demonstrates: Au: Mild sensorineural hearing loss with precipitous slope in the higher frequencies to severe sensorineural hearing loss after 1000 Hz. WRS 80% right, WRS 88% left. Type A tympanogram right. Type A tympanogram left. Assessment and Plan     1. Sensorineural hearing loss (SNHL) of both ears  - Would benefit from amplification with hearing aids. Provided with hearing aid clearance form as well as copy of audiogram.  - Encouraged loud noise avoidance and wearing of hearing protection when exposed. - Recommend surveillance of hearing with yearly audiogram.    2. Tinnitus of both ears  - Discussed tinnitus masking techniques (eg. Fan running in the room, radio tuned between stations giving white noise, light music, or white noise machine or a noise generator)  - Loud noise avoidance and wearing of hearing protection when exposed    3. Epistaxis  -Antibiotic ointment inside of both nostrils along the nasal septum twice daily for the next 14 days  -Frequent use of nasal saline gel sprays multiple times a day  -Consider humidification in the bedroom to help with moisturization  -Avoid nasal trauma including nose picking, harsh nasal blowing, rubbing nose after blowing.  -Epistaxis instructions given in case of future bleeding.  -Symptoms worsen will consider cauterization    Follow Up     Return in about 1 year (around 6/7/2022) for repeat audiogram.        Dr. Samanta Chu [place] [address]  Department of Otolaryngology/Head & Neck Surgery  6/8/21    Medical Decision Making: The following items were considered in medical decision making:  Independent review of images  Review / order clinical lab tests  Review / order radiology tests  Decision to obtain old records    Portions of this note were dictated using Dragon.  There may be linguistic errors secondary to the use of this program.

## 2021-07-01 RX ORDER — VENLAFAXINE 75 MG/1
37.5 TABLET ORAL DAILY
Qty: 90 TABLET | Refills: 3 | Status: SHIPPED | OUTPATIENT
Start: 2021-07-01 | End: 2022-07-28

## 2021-08-17 DIAGNOSIS — I10 ESSENTIAL HYPERTENSION, BENIGN: ICD-10-CM

## 2021-08-17 RX ORDER — LISINOPRIL 40 MG/1
40 TABLET ORAL DAILY
Qty: 90 TABLET | Refills: 3
Start: 2021-08-17 | End: 2021-11-12

## 2021-08-17 RX ORDER — ATORVASTATIN CALCIUM 40 MG/1
40 TABLET, FILM COATED ORAL DAILY
Qty: 90 TABLET | Refills: 3 | Status: SHIPPED | OUTPATIENT
Start: 2021-08-17 | End: 2022-10-03

## 2021-08-17 NOTE — TELEPHONE ENCOUNTER
Pt needs a refill on his Lisinopril 40 mg sent to [facility] on [address] He states the [facility] sent over a medication refill on this medication and got no response back.  Please advise

## 2021-09-13 RX ORDER — ALLOPURINOL 100 MG/1
100 TABLET ORAL DAILY
Qty: 90 TABLET | Refills: 2 | Status: SHIPPED | OUTPATIENT
Start: 2021-09-13 | End: 2022-06-09

## 2021-09-13 NOTE — TELEPHONE ENCOUNTER
Pt called asking for refills on:  Allopurinol 100 mg  Qd    #90    [facility] on file.      Last visit 5/11/21  Lab 5/11/21  Next visit 11/11/21

## 2021-10-15 DIAGNOSIS — I48.0 PAROXYSMAL ATRIAL FIBRILLATION (HCC): ICD-10-CM

## 2021-10-15 NOTE — TELEPHONE ENCOUNTER
Received refill request for xarelto from [facility]. Last ov:2020 RMM    Last labs:cbc 11/10/2020    Last EK2020    Last Refill:2/3/2021 #90 with 2 refills    Next appointment:no future appt scheduled.

## 2021-10-15 NOTE — TELEPHONE ENCOUNTER
Medication Refill    Medication needing refilled:XARELTO 20 MG TABS tablet    Dosage of the medication: 20 mg    How are you taking this medication (QD, BID, TID, QID, PRN): 1 tablet daily    30 or 90 day supply called in: 90    When will you run out of your medication:     Which Pharmacy are we sending the medication to?:    [facility] [address], [address] [place] [address]

## 2021-10-25 ENCOUNTER — HOSPITAL ENCOUNTER (EMERGENCY)
Age: 71
Discharge: HOME | End: 2021-10-25
Attending: EMERGENCY MEDICINE
Payer: MEDICARE

## 2021-10-25 VITALS
HEART RATE: 58 BPM | RESPIRATION RATE: 18 BRPM | OXYGEN SATURATION: 97 % | SYSTOLIC BLOOD PRESSURE: 154 MMHG | TEMPERATURE: 98.8 F | DIASTOLIC BLOOD PRESSURE: 59 MMHG

## 2021-10-25 DIAGNOSIS — M79.641 RIGHT HAND PAIN: Primary | ICD-10-CM

## 2021-10-25 DIAGNOSIS — R60.0 EDEMA OF HAND: ICD-10-CM

## 2021-10-25 PROCEDURE — 99283 EMERGENCY DEPT VISIT LOW MDM: CPT

## 2021-10-25 PROCEDURE — 6370000000 HC RX 637 (ALT 250 FOR IP): Performed by: EMERGENCY MEDICINE

## 2021-10-25 RX ORDER — COLCHICINE 0.6 MG/1
0.6 TABLET ORAL DAILY
Qty: 30 TABLET | Refills: 3 | Status: SHIPPED | OUTPATIENT
Start: 2021-10-25 | End: 2022-10-18

## 2021-10-25 RX ORDER — PREDNISONE 20 MG/1
40 TABLET ORAL ONCE
Status: COMPLETED | OUTPATIENT
Start: 2021-10-25 | End: 2021-10-25

## 2021-10-25 RX ORDER — PREDNISONE 20 MG/1
20 TABLET ORAL DAILY
Qty: 5 TABLET | Refills: 0 | Status: SHIPPED | OUTPATIENT
Start: 2021-10-25 | End: 2021-10-30

## 2021-10-25 RX ORDER — DIPHENHYDRAMINE HCL 25 MG
25 TABLET ORAL ONCE
Status: COMPLETED | OUTPATIENT
Start: 2021-10-25 | End: 2021-10-25

## 2021-10-25 RX ADMIN — PREDNISONE 40 MG: 20 TABLET ORAL at 23:42

## 2021-10-25 RX ADMIN — DIPHENHYDRAMINE HYDROCHLORIDE 25 MG: 25 TABLET ORAL at 23:42

## 2021-10-25 ASSESSMENT — PAIN SCALES - GENERAL: PAINLEVEL_OUTOF10: 0

## 2021-10-26 ENCOUNTER — TELEPHONE (OUTPATIENT)
Dept: INTERNAL MEDICINE CLINIC | Age: 71
End: 2021-10-26

## 2021-10-26 NOTE — TELEPHONE ENCOUNTER
Patient called stating he was in the ED and has an appointment coming up on Nov 11th. He mentioned moving his appointment up to a sooner date; I offered to assist in changing the appointment though patient declined. He says he would like to speak to Elma Minaya before making any changes. Please give this patient a call.

## 2021-10-26 NOTE — TELEPHONE ENCOUNTER
Pt has not started pred yet and wondered if he needs to be seen right away   Advised to take the 5 days of pred and if no better call for appt   Other wise can keep regular appt if hand improves

## 2021-10-26 NOTE — ED PROVIDER NOTES
40 MG tablet Take 1 tablet by mouth daily 90 tablet 3    venlafaxine (EFFEXOR) 75 MG tablet Take 0.5 tablets by mouth daily 90 tablet 3    mupirocin (BACTROBAN) 2 % ointment Apply twice daily to inside of nostrils for the next 2 weeks then as needed for nasal dryness. 1 Tube 1    amLODIPine (NORVASC) 10 MG tablet TAKE ONE TABLET BY MOUTH DAILY 90 tablet 1    atenolol (TENORMIN) 50 MG tablet Take 1 tablet by mouth daily 90 tablet 3       ALLERGIES    No Known Allergies    FAMILY HISTORY    Family History   Problem Relation Age of Onset    High Blood Pressure Mother     High Blood Pressure Father     Coronary Art Dis Brother        SOCIAL HISTORY    Social History     Socioeconomic History    Marital status:      Spouse name: Not on file    Number of children: Not on file    Years of education: Not on file    Highest education level: Not on file   Occupational History    Not on file   Tobacco Use    Smoking status: Never Smoker    Smokeless tobacco: Never Used   Vaping Use    Vaping Use: Never used   Substance and Sexual Activity    Alcohol use: Yes     Comment: 3 weekly    Drug use: No    Sexual activity: Not on file   Other Topics Concern    Not on file   Social History Narrative    Not on file     Social Determinants of Health     Financial Resource Strain:     Difficulty of Paying Living Expenses:    Food Insecurity:     Worried About Running Out of Food in the Last Year:     Ran Out of Food in the Last Year:    Transportation Needs:     Lack of Transportation (Medical):      Lack of Transportation (Non-Medical):    Physical Activity:     Days of Exercise per Week:     Minutes of Exercise per Session:    Stress:     Feeling of Stress :    Social Connections:     Frequency of Communication with Friends and Family:     Frequency of Social Gatherings with Friends and Family:     Attends [employer] Services:     Active Member of Clubs or Organizations:     Attends Club or Organization Meetings:     Marital Status:    Intimate Partner Violence:     Fear of Current or Ex-Partner:     Emotionally Abused:     Physically Abused:     Sexually Abused:        PHYSICAL EXAM    VITAL SIGNS: BP (!) 154/59   Pulse 58   Temp 98.8 °F (37.1 °C) (Oral)   Resp 18   SpO2 97%   Constitutional:  Well developed, well nourished, no acute distress, non-toxic appearance   HENT:  Atraumatic, external ears normal, nose normal, oropharynx moist.  Neck- normal range of motion, no tenderness, supple   Respiratory:  No respiratory distress, normal breath sounds. Cardiovascular:  Normal rate, normal rhythm, no murmurs, no gallops, no rubs   GI:  Soft, nondistended, normal bowel sounds, nontender   Musculoskeletal: Edema of the right hand no pulse deficit no radial or ulnar pulse deficit  Integument:  Well hydrated, no rash   Neurologic: No sensory or motor deficit    RADIOLOGY/PROCEDURES    none  ED COURSE & MEDICAL DECISION MAKING    Pertinent Labs & Imaging studies reviewed. (See chart for details)  Patient appears to have other allergic reaction versus acute gouty arthritis and edema. Benadryl and prednisone were given, prescription for colchicine and prednisone. He is to return if he has pulse deficit sensation or motor deficit. He has no pulse deficit no sensation deficit no range of motion deficit. Just underlying joint effusion and edema. FINAL IMPRESSION    1. Right hand edema  2.           Jamison Rivera MD  54/41/91 [id number]

## 2021-11-11 ENCOUNTER — OFFICE VISIT (OUTPATIENT)
Dept: INTERNAL MEDICINE CLINIC | Age: 71
End: 2021-11-11
Payer: MEDICARE

## 2021-11-11 VITALS
WEIGHT: 187 LBS | DIASTOLIC BLOOD PRESSURE: 64 MMHG | BODY MASS INDEX: 27.62 KG/M2 | OXYGEN SATURATION: 98 % | SYSTOLIC BLOOD PRESSURE: 138 MMHG | HEART RATE: 62 BPM

## 2021-11-11 DIAGNOSIS — M1A.09X0 CHRONIC GOUT OF MULTIPLE SITES, UNSPECIFIED CAUSE: ICD-10-CM

## 2021-11-11 DIAGNOSIS — E78.2 MIXED HYPERLIPIDEMIA: ICD-10-CM

## 2021-11-11 DIAGNOSIS — Z23 NEED FOR INFLUENZA VACCINATION: ICD-10-CM

## 2021-11-11 DIAGNOSIS — I10 ESSENTIAL HYPERTENSION: ICD-10-CM

## 2021-11-11 DIAGNOSIS — M79.641 RIGHT HAND PAIN: Primary | ICD-10-CM

## 2021-11-11 PROCEDURE — G8417 CALC BMI ABV UP PARAM F/U: HCPCS | Performed by: NURSE PRACTITIONER

## 2021-11-11 PROCEDURE — G0008 ADMIN INFLUENZA VIRUS VAC: HCPCS | Performed by: NURSE PRACTITIONER

## 2021-11-11 PROCEDURE — G8484 FLU IMMUNIZE NO ADMIN: HCPCS | Performed by: NURSE PRACTITIONER

## 2021-11-11 PROCEDURE — 99214 OFFICE O/P EST MOD 30 MIN: CPT | Performed by: NURSE PRACTITIONER

## 2021-11-11 PROCEDURE — 4040F PNEUMOC VAC/ADMIN/RCVD: CPT | Performed by: NURSE PRACTITIONER

## 2021-11-11 PROCEDURE — 90694 VACC AIIV4 NO PRSRV 0.5ML IM: CPT | Performed by: NURSE PRACTITIONER

## 2021-11-11 PROCEDURE — 1123F ACP DISCUSS/DSCN MKR DOCD: CPT | Performed by: NURSE PRACTITIONER

## 2021-11-11 PROCEDURE — 3017F COLORECTAL CA SCREEN DOC REV: CPT | Performed by: NURSE PRACTITIONER

## 2021-11-11 PROCEDURE — 1036F TOBACCO NON-USER: CPT | Performed by: NURSE PRACTITIONER

## 2021-11-11 PROCEDURE — G8427 DOCREV CUR MEDS BY ELIG CLIN: HCPCS | Performed by: NURSE PRACTITIONER

## 2021-11-11 ASSESSMENT — ENCOUNTER SYMPTOMS
GASTROINTESTINAL NEGATIVE: 1
RESPIRATORY NEGATIVE: 1

## 2021-11-11 NOTE — PROGRESS NOTES
SUBJECTIVE:    Patient ID: Mateus Toth is a 80 y.o. male. CC: Right hand pain, gout, hypertension, hyperlipidemia    HPI: The patient presents to the office today for routine follow-up of chronic medical conditions as well as for follow-up of a recent hospital visit. The patient recently developed acute right hand pain and swelling. He denied any known injury or trauma. He went to the emergency department and this was felt to be related to his history of gout. He was placed on prednisone and colchicine. He took these medications as directed. He stopped the colchicine after about 5 days. He reports very rare gout flares. His last gout flare was many years ago. Uric acid level was not drawn at the time of the appointment. Patient has a history of hypertension and hyperlipidemia. He denies any chest pain or shortness of breath. He is compliant with his medication regimen. Past Medical History:   Diagnosis Date    Atrial fibrillation ([address]     Cerebral artery occlusion with cerebral infarction (HCC)     Hyperlipidemia     Hypertension     Sleep apnea     uses c-pap        Current Outpatient Medications   Medication Sig Dispense Refill    rivaroxaban (XARELTO) 20 MG TABS tablet TAKE ONE TABLET BY MOUTH DAILY WITH BREAKFAST 90 tablet 2    allopurinol (ZYLOPRIM) 100 MG tablet Take 1 tablet by mouth daily 90 tablet 2    atorvastatin (LIPITOR) 40 MG tablet Take 1 tablet by mouth daily 90 tablet 3    lisinopril (PRINIVIL;ZESTRIL) 40 MG tablet Take 1 tablet by mouth daily 90 tablet 3    venlafaxine (EFFEXOR) 75 MG tablet Take 0.5 tablets by mouth daily 90 tablet 3    mupirocin (BACTROBAN) 2 % ointment Apply twice daily to inside of nostrils for the next 2 weeks then as needed for nasal dryness.  1 Tube 1    amLODIPine (NORVASC) 10 MG tablet TAKE ONE TABLET BY MOUTH DAILY 90 tablet 1    atenolol (TENORMIN) 50 MG tablet Take 1 tablet by mouth daily 90 tablet 3    colchicine (COLCRYS) today for hypertension and gout. Diagnoses and all orders for this visit:    Right hand pain  Chronic gout of multiple sites, unspecified cause  -Recent right hand pain and swelling. Felt to be related to his history of gout. He has rare gout flares.  -This improved with prednisone and colchicine  -Given the rare gout flares, would not change his chronic treatment for now. At next annual blood work, we have agreed to check his uric acid. Essential hypertension  - Normotensive  - he has met JNC standards.  - Continue current regimen.     Mixed hyperlipidemia  -Denies chest pain or shortness of breath  -Continue current regimen    Need for influenza vaccination  -     INFLUENZA, QUADV, ADJUVANTED, 65 YRS =, IM, PF, PREFILL SYR, 0.5ML (FLUAD)        AMANDO Bennett - CNP

## 2021-11-12 ENCOUNTER — HOSPITAL ENCOUNTER (EMERGENCY)
Age: 71
Discharge: HOME | End: 2021-11-12
Attending: EMERGENCY MEDICINE
Payer: MEDICARE

## 2021-11-12 VITALS
SYSTOLIC BLOOD PRESSURE: 145 MMHG | HEART RATE: 55 BPM | OXYGEN SATURATION: 99 % | RESPIRATION RATE: 16 BRPM | TEMPERATURE: 97.1 F | DIASTOLIC BLOOD PRESSURE: 71 MMHG

## 2021-11-12 DIAGNOSIS — T78.3XXA ANGIOEDEMA, INITIAL ENCOUNTER: Primary | ICD-10-CM

## 2021-11-12 PROCEDURE — 2580000003 HC RX 258: Performed by: EMERGENCY MEDICINE

## 2021-11-12 PROCEDURE — 6360000002 HC RX W HCPCS: Performed by: EMERGENCY MEDICINE

## 2021-11-12 PROCEDURE — 96375 TX/PRO/DX INJ NEW DRUG ADDON: CPT

## 2021-11-12 PROCEDURE — 99283 EMERGENCY DEPT VISIT LOW MDM: CPT

## 2021-11-12 PROCEDURE — 96374 THER/PROPH/DIAG INJ IV PUSH: CPT

## 2021-11-12 RX ORDER — DIPHENHYDRAMINE HYDROCHLORIDE 50 MG/ML
50 INJECTION INTRAMUSCULAR; INTRAVENOUS ONCE
Status: COMPLETED | OUTPATIENT
Start: 2021-11-12 | End: 2021-11-12

## 2021-11-12 RX ORDER — METHYLPREDNISOLONE SODIUM SUCCINATE 125 MG/2ML
125 INJECTION, POWDER, LYOPHILIZED, FOR SOLUTION INTRAMUSCULAR; INTRAVENOUS ONCE
Status: COMPLETED | OUTPATIENT
Start: 2021-11-12 | End: 2021-11-12

## 2021-11-12 RX ORDER — 0.9 % SODIUM CHLORIDE 0.9 %
1000 INTRAVENOUS SOLUTION INTRAVENOUS ONCE
Status: COMPLETED | OUTPATIENT
Start: 2021-11-12 | End: 2021-11-12

## 2021-11-12 RX ORDER — AMLODIPINE BESYLATE 10 MG/1
TABLET ORAL
Qty: 90 TABLET | Refills: 1 | Status: SHIPPED | OUTPATIENT
Start: 2021-11-12 | End: 2022-05-12 | Stop reason: SDUPTHER

## 2021-11-12 RX ORDER — PREDNISONE 20 MG/1
20 TABLET ORAL DAILY
Qty: 5 TABLET | Refills: 0 | Status: SHIPPED | OUTPATIENT
Start: 2021-11-12 | End: 2021-11-17

## 2021-11-12 RX ORDER — EPINEPHRINE 0.3 MG/.3ML
0.3 INJECTION SUBCUTANEOUS ONCE
Qty: 0.3 ML | Refills: 0 | Status: SHIPPED | OUTPATIENT
Start: 2021-11-12 | End: 2022-10-18

## 2021-11-12 RX ADMIN — SODIUM CHLORIDE 1000 ML: 9 INJECTION, SOLUTION INTRAVENOUS at 04:17

## 2021-11-12 RX ADMIN — DIPHENHYDRAMINE HYDROCHLORIDE 50 MG: 50 INJECTION, SOLUTION INTRAMUSCULAR; INTRAVENOUS at 04:16

## 2021-11-12 RX ADMIN — METHYLPREDNISOLONE SODIUM SUCCINATE 125 MG: 125 INJECTION, POWDER, FOR SOLUTION INTRAMUSCULAR; INTRAVENOUS at 04:16

## 2021-11-12 NOTE — ED PROVIDER NOTES
[address] PROVIDER NOTE    Patient Identification  Pt Name: Celsa Thompson  MRN: [id number]  [address] 1950  Date of evaluation: 11/12/2021  Provider: Vicki Danielle MD  PCP: Yessi Burgos, APRN - CNP    Chief Complaint  Facial Swelling (pt. with mouth swelling to left side of mouth.)  Facial swelling    HPI  History provided by patient   This is a 80 y.o. male who presents to the ED for facial swelling. Began this evening while at rest. Denies any injury. It is pruritic mildly. It is on the left buccal area. Spares the tongue, throat. No difficulty breathing. No difficulty controlling his secretions. This happened once 35 years ago. He does take lisinopril. 2 weeks ago, he had hand swelling of unclear etiology that was attributed to gout and he was treated with prednisone, colchicine, allopurinol. He also complains of right first MTP swelling without pain. No nausea or vomiting. No fevers or chills. No new medications. Alignable  12 systems reviewed, pertinent positives/negatives per HPI otherwise noted to be negative. I have reviewed the following nursing documentation:  Allergies: Patient has no known allergies. Past medical history:   Past Medical History:   Diagnosis Date    Atrial fibrillation ([address]     Cerebral artery occlusion with cerebral infarction ([address]     Hyperlipidemia     Hypertension     Sleep apnea     uses c-pap     Past surgical history:   Past Surgical History:   Procedure Laterality Date    APPENDECTOMY      ATRIAL ABLATION SURGERY  12/12/2018    Performed by Dr. Lesly Bean.      COLONOSCOPY  11/09    HEMICOLECTOMY  10/21/2016     OPEN RIGHT HEMICOLECTOMY                  REVISION TOTAL KNEE ARTHROPLASTY Right 06/11/2020    TONSILLECTOMY         Home medications:   Current Discharge Medication List      CONTINUE these medications which have NOT CHANGED    Details   rivaroxaban (XARELTO) 20 MG TABS tablet TAKE ONE TABLET BY MOUTH DAILY WITH BREAKFAST  Qty: 90 tablet, Refills: 2    Associated Diagnoses: Paroxysmal atrial fibrillation (HCC)      allopurinol (ZYLOPRIM) 100 MG tablet Take 1 tablet by mouth daily  Qty: 90 tablet, Refills: 2      atorvastatin (LIPITOR) 40 MG tablet Take 1 tablet by mouth daily  Qty: 90 tablet, Refills: 3      venlafaxine (EFFEXOR) 75 MG tablet Take 0.5 tablets by mouth daily  Qty: 90 tablet, Refills: 3      mupirocin (BACTROBAN) 2 % ointment Apply twice daily to inside of nostrils for the next 2 weeks then as needed for nasal dryness. Qty: 1 Tube, Refills: 1      amLODIPine (NORVASC) 10 MG tablet TAKE ONE TABLET BY MOUTH DAILY  Qty: 90 tablet, Refills: 1      atenolol (TENORMIN) 50 MG tablet Take 1 tablet by mouth daily  Qty: 90 tablet, Refills: 3      colchicine (COLCRYS) 0.6 MG tablet Take 1 tablet by mouth daily  Qty: 30 tablet, Refills: 3             Social history:  reports that he has never smoked. He has never used smokeless tobacco. He reports current alcohol use. He reports that he does not use drugs. Family history:    Family History   Problem Relation Age of Onset    High Blood Pressure Mother     High Blood Pressure Father     Coronary Art Dis Brother          Exam  ED Triage Vitals   BP Temp Temp src Pulse Resp SpO2 Height Weight   -- -- -- -- -- -- -- --     Nursing note and vitals reviewed. Constitutional: In no acute distress  HENT:      Head: Normocephalic      Ears: External ears normal.      Nose: Nose normal.     Mouth: Membrane mucosa moist. Left facial swelling including the bottom lip, no tongue involvement, no oropharyngeal involvement  Eyes: No discharge. Neck: Supple. Trachea midline. Cardiovascular: Regular rate. Warm extremities  Pulmonary/Chest: Effort normal. No respiratory distress. No stridor or wheezing clear to auscultation bilaterally. Great range of motion of the neck  Abdominal: Soft. No distension.  Nontender  : Deferred  Rectal: Deferred   Musculoskeletal: Moves all extremities. No gross deformity. Neurological: Alert and oriented. Face symmetric. Speech is clear. Skin: Warm and dry. Psychiatric: Normal mood and affect. Behavior is normal.    Procedures      Radiology  No orders to display       Labs  No results found for this visit on 11/12/21. Screenings           MDM and ED Course  This is a 80 y.o. male who presents to the ED for left-sided facial swelling that began this evening. Likely secondary to angioedema. May be secondary to lisinopril use. Instructed the patient to never use this again. He is aware he needs to follow-up with an allergist. Will give Solu-Medrol, Benadryl, fluids. No signs of airway compromise at this time. Will observe the patient for 6 hours    Reevaluated patient at least 3 times to ensure no signs of airway compromise. Each time, his exam has been unchanged    Patient signed out to dr Jose F Cardona at [facility] pending reevaluation/observation for facial swelling      The total critical care time spent while evaluating and treating this patient was at least 31 minutes. This excludes time spent doing separately billable procedures. This includes time at the bedside, data interpretation, medication management, obtaining critical history from collateral sources if the patient is unable to provide it directly, and physician consultation. Specifics of interventions taken and potentially life-threatening diagnostic considerations are listed above in the medical decision making. [unfilled]    Final Impression  1. Angioedema, initial encounter        There were no vitals taken for this visit.      Disposition:  DISPOSITION        Patient Referrals:  Js Mac MD  [address] [place] [address], [address]  [address]  [phone]            Discharge Medications:  Current Discharge Medication List          Discontinued Medications:  Current Discharge Medication List          This chart was generated using the Dragon dictation system. I created this record but it may contain dictation errors given the limitations of this technology.         Marino Beasley MD  11/15/21 22 Klaudia Simpson MD  11/15/21 9775

## 2021-11-12 NOTE — TELEPHONE ENCOUNTER
Received refill request for Amlodipine from [address] [place] [address].      Last OV: 07/07/2020 w/ RMM     Last Refill: 05/03/2021 #90 w/1 refill    Next Appointment: none at this time

## 2021-11-12 NOTE — ED NOTES
Pt. Is awake,alert and oriented x4. Pt. With left sided lip and mouth swelling. Pt. C/o itching and numbness feeling on mouth. resp easy and regular. Denies any SOB. Call light in reach.       Ravindra Bartholomew RN  11/12/21 [id number]

## 2022-01-07 ENCOUNTER — TELEPHONE (OUTPATIENT)
Dept: INTERNAL MEDICINE CLINIC | Age: 72
End: 2022-01-07

## 2022-01-07 RX ORDER — ATENOLOL 50 MG/1
50 TABLET ORAL DAILY
Qty: 90 TABLET | Refills: 3 | Status: SHIPPED | OUTPATIENT
Start: 2022-01-07

## 2022-02-21 ENCOUNTER — HOSPITAL ENCOUNTER (EMERGENCY)
Age: 72
Discharge: HOME | End: 2022-02-21
Payer: MEDICARE

## 2022-02-21 ENCOUNTER — APPOINTMENT (OUTPATIENT)
Dept: CT IMAGING | Age: 72
End: 2022-02-21
Payer: MEDICARE

## 2022-02-21 VITALS
BODY MASS INDEX: 28.65 KG/M2 | TEMPERATURE: 98.9 F | RESPIRATION RATE: 16 BRPM | SYSTOLIC BLOOD PRESSURE: 160 MMHG | WEIGHT: 194 LBS | OXYGEN SATURATION: 98 % | DIASTOLIC BLOOD PRESSURE: 63 MMHG | HEART RATE: 66 BPM

## 2022-02-21 DIAGNOSIS — S09.90XA CLOSED HEAD INJURY, INITIAL ENCOUNTER: Primary | ICD-10-CM

## 2022-02-21 DIAGNOSIS — S01.01XA LACERATION OF SCALP, INITIAL ENCOUNTER: ICD-10-CM

## 2022-02-21 DIAGNOSIS — Z23 TETANUS TOXOID VACCINATION ADMINISTERED AT CURRENT VISIT: ICD-10-CM

## 2022-02-21 PROCEDURE — 99283 EMERGENCY DEPT VISIT LOW MDM: CPT

## 2022-02-21 PROCEDURE — 6360000002 HC RX W HCPCS: Performed by: PHYSICIAN ASSISTANT

## 2022-02-21 PROCEDURE — 12002 RPR S/N/AX/GEN/TRNK2.6-7.5CM: CPT

## 2022-02-21 PROCEDURE — 90715 TDAP VACCINE 7 YRS/> IM: CPT | Performed by: PHYSICIAN ASSISTANT

## 2022-02-21 PROCEDURE — 70450 CT HEAD/BRAIN W/O DYE: CPT

## 2022-02-21 PROCEDURE — 72125 CT NECK SPINE W/O DYE: CPT

## 2022-02-21 PROCEDURE — 90471 IMMUNIZATION ADMIN: CPT | Performed by: PHYSICIAN ASSISTANT

## 2022-02-21 RX ADMIN — TETANUS TOXOID, REDUCED DIPHTHERIA TOXOID AND ACELLULAR PERTUSSIS VACCINE, ADSORBED 0.5 ML: 5; 2.5; 8; 8; 2.5 SUSPENSION INTRAMUSCULAR at 12:44

## 2022-02-21 ASSESSMENT — ENCOUNTER SYMPTOMS
NAUSEA: 0
SHORTNESS OF BREATH: 0
CONSTIPATION: 0
ABDOMINAL PAIN: 0
PHOTOPHOBIA: 0
COLOR CHANGE: 0
RESPIRATORY NEGATIVE: 1
VOMITING: 0
DIARRHEA: 0
CHEST TIGHTNESS: 0
COUGH: 0

## 2022-02-21 ASSESSMENT — PAIN SCALES - GENERAL: PAINLEVEL_OUTOF10: 3

## 2022-02-21 ASSESSMENT — PAIN - FUNCTIONAL ASSESSMENT: PAIN_FUNCTIONAL_ASSESSMENT: 0-10

## 2022-02-21 NOTE — ED PROVIDER NOTES
[address] [facility]        Pt Name: Beckie Limon  MRN: [id number]  [place] 1950  Date of evaluation: 2/21/2022  Provider: TARIQ Llamas  PCP: AMANDO Barraza CNP  Note Started: 12:41 PM EST       MIERLA. I have evaluated this patient. My supervising physician was available for consultation. CHIEF COMPLAINT       Chief Complaint   Patient presents with    Head Laceration     Pt reports sandy of car coming down on head, laceration noted to top of head-bleeding controlled. Takes xarelto       HISTORY OF PRESENT ILLNESS   (Location, Timing/Onset, Context/Setting, Quality, Duration, Modifying Factors, Severity, Associated Signs and Symptoms)  Note limiting factors. Chief Complaint: Ruddy Mcgregor is a 80 y.o. male with past medical history of atrial fibrillation on Xarelto, CVA, hyperlipidemia, hypertension and sleep apnea who presents to the ED with complaint of a head laceration. Patient states he was working on his car attempting to change the headlights. Patient states the sandy of the car came down and hit him in the top of the head. He has laceration to the top of his head and states he thought he needed stable so came to the ED for further evaluation and treatment. He is complaining of an aching discomfort rated 3/10 over the laceration site. Denies diffuse headache. Denies neck pain, loss of conscious, visual changes, speech disturbances, numbness/tingling or lightheadedness/dizziness. Denies any other injury or trauma throughout. Denies foreign body sensation. Denies any epistaxis. Denies any dental injury. Patient unsure of last tetanus vaccine but believes greater than 5 years ago. Came to the ED for further evaluation and treatment. Nursing Notes were all reviewed and agreed with or any disagreements were addressed in the HPI.     REVIEW OF SYSTEMS    (2-9 systems for level 4, 10 or more for level 5)     Review of Systems   Constitutional: Negative for activity change, appetite change, chills and fever. Eyes: Negative for photophobia and visual disturbance. Respiratory: Negative. Negative for cough, chest tightness and shortness of breath. Cardiovascular: Negative. Negative for chest pain, palpitations and leg swelling. Gastrointestinal: Negative for abdominal pain, constipation, diarrhea, nausea and vomiting. Genitourinary: Negative for decreased urine volume, difficulty urinating, dysuria, flank pain, frequency, hematuria and urgency. Musculoskeletal: Positive for myalgias. Negative for arthralgias, neck pain and neck stiffness. Skin: Positive for wound. Negative for color change, pallor and rash. Neurological: Positive for headaches. Negative for dizziness, tremors, seizures, syncope, facial asymmetry, speech difficulty, weakness, light-headedness and numbness. Positives and Pertinent negatives as per HPI. Except as noted above in the ROS, all other systems were reviewed and negative. PAST MEDICAL HISTORY     Past Medical History:   Diagnosis Date    Atrial fibrillation ([address]     Cerebral artery occlusion with cerebral infarction ([address]     Hyperlipidemia     Hypertension     Sleep apnea     uses c-pap         SURGICAL HISTORY     Past Surgical History:   Procedure Laterality Date    APPENDECTOMY      ATRIAL ABLATION SURGERY  12/12/2018    Performed by Dr. Tete Smith.      COLONOSCOPY  11/09    HEMICOLECTOMY  10/21/2016     OPEN RIGHT HEMICOLECTOMY                  REVISION TOTAL KNEE ARTHROPLASTY Right 06/11/2020    TONSILLECTOMY           CURRENTMEDICATIONS       Discharge Medication List as of 2/21/2022  3:08 PM      CONTINUE these medications which have NOT CHANGED    Details   atenolol (TENORMIN) 50 MG tablet Take 1 tablet by mouth daily, Disp-90 tablet, R-3Normal      amLODIPine (NORVASC) 10 MG tablet TAKE ONE TABLET BY MOUTH DAILY, Disp-90 tablet, R-1Normal      colchicine (COLCRYS) 0.6 MG tablet Take 1 tablet by mouth daily, Disp-30 tablet, R-3Print      rivaroxaban (XARELTO) 20 MG TABS tablet TAKE ONE TABLET BY MOUTH DAILY WITH BREAKFAST, Disp-90 tablet, R-2Normal      allopurinol (ZYLOPRIM) 100 MG tablet Take 1 tablet by mouth daily, Disp-90 tablet, R-2Normal      atorvastatin (LIPITOR) 40 MG tablet Take 1 tablet by mouth daily, Disp-90 tablet, R-3Normal      venlafaxine (EFFEXOR) 75 MG tablet Take 0.5 tablets by mouth daily, Disp-90 tablet, R-3Normal      EPINEPHrine (EPIPEN 2-SHANTHI) 0.3 MG/0.3ML SOAJ injection Inject 0.3 mLs into the muscle once for 1 dose Use as directed for allergic reaction, Disp-0.3 mL, R-0Print      mupirocin (BACTROBAN) 2 % ointment Apply twice daily to inside of nostrils for the next 2 weeks then as needed for nasal dryness. , Disp-1 Tube, R-1, Normal               ALLERGIES     Lisinopril    FAMILYHISTORY       Family History   Problem Relation Age of Onset    High Blood Pressure Mother     High Blood Pressure Father     Coronary Art Dis Brother           SOCIAL HISTORY       Social History     Tobacco Use    Smoking status: Never Smoker    Smokeless tobacco: Never Used   Vaping Use    Vaping Use: Never used   Substance Use Topics    Alcohol use: Yes     Comment: 3 weekly    Drug use: No       SCREENINGS    [place] Coma Scale  Eye Opening: Spontaneous  Best Verbal Response: Oriented  Best Motor Response: Obeys commands  [place] Coma Scale Score: 15        PHYSICAL EXAM    (up to 7 for level 4, 8 or more for level 5)     ED Triage Vitals [02/21/22 1224]   BP Temp Temp Source Pulse Resp SpO2 Height Weight   (!) 160/63 98.9 °F (37.2 °C) Oral 66 16 98 % -- 194 lb (88 kg)       Physical Exam  Constitutional:       General: He is not in acute distress. Appearance: Normal appearance. He is well-developed. He is not ill-appearing, toxic-appearing or diaphoretic. HENT:      Head: Normocephalic.       Comments: Upon examination patient has approximately 3 cm slightly gaping laceration noted over the frontal head. No crepitus or step-off. No palpable visual foreign body. No edema, ecchymosis, erythema or warmth. No bleeding or drainage at this time. There is no raccoon eyes or dee sign. No epistaxis. No septal hematoma. No trismus or jaw malocclusion peer     Right Ear: External ear normal.      Left Ear: External ear normal.      Nose: Nose normal. No congestion or rhinorrhea. Mouth/Throat:      Mouth: Mucous membranes are moist.      Pharynx: No oropharyngeal exudate or posterior oropharyngeal erythema. Eyes:      General:         Right eye: No discharge. Left eye: No discharge. Extraocular Movements: Extraocular movements intact. Conjunctiva/sclera: Conjunctivae normal.      Pupils: Pupils are equal, round, and reactive to light. Cardiovascular:      Rate and Rhythm: Regular rhythm. Pulmonary:      Effort: Pulmonary effort is normal. No respiratory distress. Breath sounds: No stridor. Chest:      Chest wall: No tenderness. Abdominal:      General: Abdomen is flat. There is no distension. Palpations: Abdomen is soft. There is no mass. Tenderness: There is no abdominal tenderness. There is no right CVA tenderness, left CVA tenderness, guarding or rebound. Hernia: No hernia is present. Musculoskeletal:         General: Normal range of motion. Cervical back: Normal range of motion and neck supple. Comments: No TTP to the midline cervical, thoracic or lumbar spine. No anterior chest wall tenderness. No TTP to the upper and lower extremities throughout. Full range of motion strength to the upper and lower extremities throughout. Distal neurovascular intact. Gait normal.   Skin:     General: Skin is warm and dry. Coloration: Skin is not pale. Findings: No erythema or rash. Neurological:      General: No focal deficit present.       Mental Status: He is alert and oriented to person, place, and time. GCS: GCS eye subscore is 4. GCS verbal subscore is 5. GCS motor subscore is 6. Cranial Nerves: Cranial nerves are intact. No cranial nerve deficit, dysarthria or facial asymmetry. Sensory: Sensation is intact. No sensory deficit. Motor: Motor function is intact. No weakness or tremor. Coordination: Coordination is intact. Gait: Gait is intact. Gait normal.   Psychiatric:         Behavior: Behavior normal.         DIAGNOSTIC RESULTS   LABS:    Labs Reviewed - No data to display    When ordered only abnormal lab results are displayed. All other labs were within normal range or not returned as of this dictation. EKG: When ordered, EKG's are interpreted by the Emergency Department Physician in the absence of a cardiologist.  Please see their note for interpretation of EKG. RADIOLOGY:   Non-plain film images such as CT, Ultrasound and MRI are read by the radiologist. Plain radiographic images are visualized and preliminarily interpreted by the ED Provider with the below findings:        Interpretation per the Radiologist below, if available at the time of this note:    CT HEAD WO CONTRAST   Final Result   No acute intracranial abnormality. CT CERVICAL SPINE WO CONTRAST   Final Result   No acute abnormality of the cervical spine. No results found. PROCEDURES   Unless otherwise noted below, none     Lac Repair    Date/Time: 2/21/2022 3:11 PM  Performed by: TARIQ Katz  Authorized by: TARIQ Katz     Consent:     Consent obtained:  Verbal    Consent given by:  Patient    Risks discussed:  Infection, need for additional repair, nerve damage, pain, poor cosmetic result, poor wound healing, retained foreign body, tendon damage and vascular damage    Alternatives discussed:  No treatment  Anesthesia (see MAR for exact dosages):      Anesthesia method:  Local infiltration    Local anesthetic:  Lidocaine 1% WITH epi  Laceration details:     Location:  Scalp    Scalp location:  Frontal    Length (cm):  3    Depth (mm):  2  Repair type:     Repair type:  Simple  Pre-procedure details:     Preparation:  Patient was prepped and draped in usual sterile fashion and imaging obtained to evaluate for foreign bodies  Exploration:     Hemostasis achieved with:  Direct pressure and epinephrine    Wound exploration: wound explored through full range of motion and entire depth of wound probed and visualized      Wound extent: no foreign bodies/material noted, no muscle damage noted, no nerve damage noted, no tendon damage noted, no underlying fracture noted and no vascular damage noted      Contaminated: no    Treatment:     Area cleansed with:  Hibiclens and saline    Amount of cleaning:  Extensive    Irrigation solution:  Sterile saline    Irrigation method:  Pressure wash    Visualized foreign bodies/material removed: no    Skin repair:     Repair method:  Staples    Number of staples:  5  Approximation:     Approximation:  Close  Post-procedure details:     Dressing:  Open (no dressing)    Patient tolerance of procedure: Tolerated well, no immediate complications        CRITICAL CARE TIME       CONSULTS:  None      EMERGENCY DEPARTMENT COURSE and DIFFERENTIAL DIAGNOSIS/MDM:   Vitals:    Vitals:    02/21/22 1224   BP: (!) 160/63   Pulse: 66   Resp: 16   Temp: 98.9 °F (37.2 °C)   TempSrc: Oral   SpO2: 98%   Weight: 194 lb (88 kg)       Patient was given the following medications:  Medications   Tetanus-Diphth-Acell Pertussis (BOOSTRIX) injection 0.5 mL (0.5 mLs IntraMUSCular Given 2/21/22 1244)           Patient is a 28-year-old male who presents to the ED with complaint of a closed head injury. He is anticoagulated with close head injury and CT of the head and cervical spine obtained and showed no acute abnormalities. Patient suffering from closed head injury with scalp laceration. Tetanus was updated given unknown status.  Wound was anesthetized and repaired by myself. Patient tolerated procedure well. Five staples placed. Instructed on proper wound care. Staples removed in 10 days. Follow-up with PCP. Return to the ED for any worsening symptoms. Low suspicion for intracranial injury, acute fracture/dislocation, subdural hematoma, subarachnoid hemorrhage, intracranial mass, intracranial hemorrhage, cervical injury, retained foreign body or other emergent etiology at this time. FINAL IMPRESSION      1. Closed head injury, initial encounter    2. Laceration of scalp, initial encounter    3.  Tetanus toxoid vaccination administered at current visit          DISPOSITION/PLAN   DISPOSITION Decision To Discharge 02/21/2022 03:07:19 PM      PATIENT REFERRED TO:  Herrera Lim, APRN - [facility]  [address]  [phone]    Schedule an appointment as soon as possible for a visit   For staple removal 10-12 days      DISCHARGE MEDICATIONS:  Discharge Medication List as of 2/21/2022  3:08 PM          DISCONTINUED MEDICATIONS:  Discharge Medication List as of 2/21/2022  3:08 PM      STOP taking these medications       lisinopril (PRINIVIL;ZESTRIL) 40 MG tablet Comments:   Reason for Stopping:                      (Please note that portions of this note were completed with a voice recognition program.  Efforts were made to edit the dictations but occasionally words are mis-transcribed.)    TARIQ Hoover (electronically signed)          Toya Fothergill, PA  02/21/22 1325

## 2022-03-03 ENCOUNTER — OFFICE VISIT (OUTPATIENT)
Dept: INTERNAL MEDICINE CLINIC | Age: 72
End: 2022-03-03
Payer: MEDICARE

## 2022-03-03 VITALS
DIASTOLIC BLOOD PRESSURE: 80 MMHG | HEART RATE: 56 BPM | BODY MASS INDEX: 27.91 KG/M2 | SYSTOLIC BLOOD PRESSURE: 124 MMHG | OXYGEN SATURATION: 98 % | WEIGHT: 189 LBS

## 2022-03-03 DIAGNOSIS — Z48.02 REMOVAL OF STAPLES: ICD-10-CM

## 2022-03-03 DIAGNOSIS — S01.01XD LACERATION OF SCALP, SUBSEQUENT ENCOUNTER: ICD-10-CM

## 2022-03-03 DIAGNOSIS — S09.90XA CLOSED HEAD INJURY, INITIAL ENCOUNTER: Primary | ICD-10-CM

## 2022-03-03 PROCEDURE — 3017F COLORECTAL CA SCREEN DOC REV: CPT | Performed by: NURSE PRACTITIONER

## 2022-03-03 PROCEDURE — 99213 OFFICE O/P EST LOW 20 MIN: CPT | Performed by: NURSE PRACTITIONER

## 2022-03-03 PROCEDURE — 1036F TOBACCO NON-USER: CPT | Performed by: NURSE PRACTITIONER

## 2022-03-03 PROCEDURE — G8427 DOCREV CUR MEDS BY ELIG CLIN: HCPCS | Performed by: NURSE PRACTITIONER

## 2022-03-03 PROCEDURE — 1123F ACP DISCUSS/DSCN MKR DOCD: CPT | Performed by: NURSE PRACTITIONER

## 2022-03-03 PROCEDURE — G8417 CALC BMI ABV UP PARAM F/U: HCPCS | Performed by: NURSE PRACTITIONER

## 2022-03-03 PROCEDURE — G8484 FLU IMMUNIZE NO ADMIN: HCPCS | Performed by: NURSE PRACTITIONER

## 2022-03-03 PROCEDURE — 4040F PNEUMOC VAC/ADMIN/RCVD: CPT | Performed by: NURSE PRACTITIONER

## 2022-03-03 ASSESSMENT — ENCOUNTER SYMPTOMS
GASTROINTESTINAL NEGATIVE: 1
RESPIRATORY NEGATIVE: 1

## 2022-03-03 NOTE — PROGRESS NOTES
SUBJECTIVE:    Patient ID: Angy Au is a 80 y.o. male. CC: ER follow-up, closed head injury, scalp laceration    HPI: Patient presents the office today for emergency department follow-up after a closed head injury with scalp laceration. Patient was in his normal state of health when a car sandy fell on his head causing a laceration of the scalp. He went to the emergency department after he did not stop the bleeding. CT in the emergency department showed no concerning findings. The patient was found to have a scalp laceration which was closed with 5 staples. Today, the patient reports he is doing well. He denies any drainage from the wound. He denies any headaches. He denies any other neurological changes. He has questions today about shingles vaccination. Past Medical History:   Diagnosis Date    Atrial fibrillation ([address]     Cerebral artery occlusion with cerebral infarction [facility]     Hyperlipidemia     Hypertension     Sleep apnea     uses c-pap        Current Outpatient Medications   Medication Sig Dispense Refill    atenolol (TENORMIN) 50 MG tablet Take 1 tablet by mouth daily 90 tablet 3    amLODIPine (NORVASC) 10 MG tablet TAKE ONE TABLET BY MOUTH DAILY 90 tablet 1    EPINEPHrine (EPIPEN 2-SHANTHI) 0.3 MG/0.3ML SOAJ injection Inject 0.3 mLs into the muscle once for 1 dose Use as directed for allergic reaction 0.3 mL 0    colchicine (COLCRYS) 0.6 MG tablet Take 1 tablet by mouth daily 30 tablet 3    rivaroxaban (XARELTO) 20 MG TABS tablet TAKE ONE TABLET BY MOUTH DAILY WITH BREAKFAST 90 tablet 2    allopurinol (ZYLOPRIM) 100 MG tablet Take 1 tablet by mouth daily 90 tablet 2    atorvastatin (LIPITOR) 40 MG tablet Take 1 tablet by mouth daily 90 tablet 3    venlafaxine (EFFEXOR) 75 MG tablet Take 0.5 tablets by mouth daily 90 tablet 3    mupirocin (BACTROBAN) 2 % ointment Apply twice daily to inside of nostrils for the next 2 weeks then as needed for nasal dryness.  1 Tube 1     No current facility-administered medications for this visit. Review of Systems   Constitutional: Negative. Respiratory: Negative. Gastrointestinal: Negative. Genitourinary: Negative. Musculoskeletal: Negative. Skin: Positive for wound. Neurological: Negative. Psychiatric/Behavioral: Negative. All other systems reviewed and are negative. OBJECTIVE:  Physical Exam  Vitals reviewed. Constitutional:       General: He is not in acute distress. Appearance: He is well-developed. He is not diaphoretic. HENT:      Head: Normocephalic and atraumatic. Eyes:      General: No scleral icterus. Conjunctiva/sclera: Conjunctivae normal.   Neck:      Vascular: No JVD. Cardiovascular:      Rate and Rhythm: Normal rate and regular rhythm. Pulmonary:      Effort: Pulmonary effort is normal. No respiratory distress. Breath sounds: Normal breath sounds. No wheezing or rales. Abdominal:      General: There is no distension. Palpations: Abdomen is soft. Tenderness: There is no abdominal tenderness. There is no guarding or rebound. Musculoskeletal:         General: Normal range of motion. Cervical back: Neck supple. Skin:     General: Skin is warm and dry. Comments: On the top of the scalp, just left of midline, there is a closed, linear, approximate 1 inch long laceration. There is no induration or fluctuance. Laceration has minor scabbing. Neurological:      Mental Status: He is alert and oriented to person, place, and time. Psychiatric:         Behavior: Behavior normal.         Thought Content:  Thought content normal.        /80   Pulse 56   Wt 189 lb (85.7 kg)   SpO2 98%   BMI 27.91 kg/m²      PHQ Scores 5/11/2021 6/5/2020 4/25/2019 8/21/2018 4/20/2017   PHQ2 Score 0 0 0 0 0   PHQ9 Score 0 0 0 0 0     Interpretation of Total Score Depression Severity: 1-4 = Minimal depression, 5-9 = Mild depression, 10-14 = Moderate depression, 15-19 = Moderately severe depression, 20-27 =Severe depression        ASSESSMENT/PLAN:  Victor Manuel Otto was seen today for head laceration. Diagnoses and all orders for this visit:    Closed head injury, initial encounter  Laceration of scalp, subsequent encounter  Removal of staples  -Car sandy fell on his head. Scalp laceration closed with 5 staples in the ER  -CT reviewed and showed no acute findings  -He is doing well. He reports no headache, wound drainage, or other neurological changes.  -Staples were removed by medical assistant without difficulty. The wound is well approximated and appears healed. There is mild scabbing. There is no induration or fluctuance.  -Resume normal hygiene. Wash the hair normally but do not scrub the laceration. Remaining scabbing should come off through the course of normal hygiene. Tetanus was updated in the ER.  -Follow-up as needed    -Patient has questions about shingles vaccination which we discussed in detail.       AMANDO Barajas - CNP

## 2022-05-12 ENCOUNTER — OFFICE VISIT (OUTPATIENT)
Dept: INTERNAL MEDICINE CLINIC | Age: 72
End: 2022-05-12
Payer: MEDICARE

## 2022-05-12 VITALS
HEART RATE: 58 BPM | SYSTOLIC BLOOD PRESSURE: 130 MMHG | HEIGHT: 69 IN | OXYGEN SATURATION: 98 % | WEIGHT: 187 LBS | DIASTOLIC BLOOD PRESSURE: 60 MMHG | BODY MASS INDEX: 27.7 KG/M2

## 2022-05-12 DIAGNOSIS — Z12.5 PROSTATE CANCER SCREENING: ICD-10-CM

## 2022-05-12 DIAGNOSIS — I48.0 PAROXYSMAL ATRIAL FIBRILLATION (HCC): ICD-10-CM

## 2022-05-12 DIAGNOSIS — E78.2 MIXED HYPERLIPIDEMIA: ICD-10-CM

## 2022-05-12 DIAGNOSIS — I10 ESSENTIAL HYPERTENSION: ICD-10-CM

## 2022-05-12 DIAGNOSIS — M1A.09X0 CHRONIC GOUT OF MULTIPLE SITES, UNSPECIFIED CAUSE: ICD-10-CM

## 2022-05-12 DIAGNOSIS — R73.01 IMPAIRED FASTING GLUCOSE: ICD-10-CM

## 2022-05-12 DIAGNOSIS — Z00.00 MEDICARE ANNUAL WELLNESS VISIT, SUBSEQUENT: Primary | ICD-10-CM

## 2022-05-12 DIAGNOSIS — Z12.11 COLON CANCER SCREENING: ICD-10-CM

## 2022-05-12 LAB
A/G RATIO: 1.9 (ref 1.1–2.2)
ALBUMIN SERPL-MCNC: 4.7 G/DL (ref 3.4–5)
ALP BLD-CCNC: 112 U/L (ref 40–129)
ALT SERPL-CCNC: 23 U/L (ref 10–40)
ANION GAP SERPL CALCULATED.3IONS-SCNC: 14 MMOL/L (ref 3–16)
AST SERPL-CCNC: 18 U/L (ref 15–37)
BASOPHILS ABSOLUTE: 0 K/UL (ref 0–0.2)
BASOPHILS RELATIVE PERCENT: 0.4 %
BILIRUB SERPL-MCNC: 0.5 MG/DL (ref 0–1)
BUN BLDV-MCNC: 17 MG/DL (ref 7–20)
CALCIUM SERPL-MCNC: 9.3 MG/DL (ref 8.3–10.6)
CHLORIDE BLD-SCNC: 104 MMOL/L (ref 99–110)
CHOLESTEROL, TOTAL: 140 MG/DL (ref 0–199)
CO2: 23 MMOL/L (ref 21–32)
CREAT SERPL-MCNC: 1.2 MG/DL (ref 0.8–1.3)
EOSINOPHILS ABSOLUTE: 0.1 K/UL (ref 0–0.6)
EOSINOPHILS RELATIVE PERCENT: 3.5 %
GFR AFRICAN AMERICAN: >60
GFR NON-AFRICAN AMERICAN: 60
GLUCOSE BLD-MCNC: 109 MG/DL (ref 70–99)
HCT VFR BLD CALC: 43.1 % (ref 40.5–52.5)
HDLC SERPL-MCNC: 42 MG/DL (ref 40–60)
HEMOGLOBIN: 14.3 G/DL (ref 13.5–17.5)
LDL CHOLESTEROL CALCULATED: 60 MG/DL
LYMPHOCYTES ABSOLUTE: 1.5 K/UL (ref 1–5.1)
LYMPHOCYTES RELATIVE PERCENT: 36.4 %
MCH RBC QN AUTO: 29.5 PG (ref 26–34)
MCHC RBC AUTO-ENTMCNC: 33.1 G/DL (ref 31–36)
MCV RBC AUTO: 89.1 FL (ref 80–100)
MONOCYTES ABSOLUTE: 0.3 K/UL (ref 0–1.3)
MONOCYTES RELATIVE PERCENT: 8.4 %
NEUTROPHILS ABSOLUTE: 2.1 K/UL (ref 1.7–7.7)
NEUTROPHILS RELATIVE PERCENT: 51.3 %
PDW BLD-RTO: 13.9 % (ref 12.4–15.4)
PLATELET # BLD: 195 K/UL (ref 135–450)
PMV BLD AUTO: 8.7 FL (ref 5–10.5)
POTASSIUM SERPL-SCNC: 4.1 MMOL/L (ref 3.5–5.1)
PROSTATE SPECIFIC ANTIGEN: 1.51 NG/ML (ref 0–4)
RBC # BLD: 4.84 M/UL (ref 4.2–5.9)
SODIUM BLD-SCNC: 141 MMOL/L (ref 136–145)
TOTAL PROTEIN: 7.2 G/DL (ref 6.4–8.2)
TRIGL SERPL-MCNC: 192 MG/DL (ref 0–150)
VLDLC SERPL CALC-MCNC: 38 MG/DL
WBC # BLD: 4.2 K/UL (ref 4–11)

## 2022-05-12 PROCEDURE — G0439 PPPS, SUBSEQ VISIT: HCPCS | Performed by: NURSE PRACTITIONER

## 2022-05-12 PROCEDURE — 99214 OFFICE O/P EST MOD 30 MIN: CPT | Performed by: NURSE PRACTITIONER

## 2022-05-12 PROCEDURE — G8417 CALC BMI ABV UP PARAM F/U: HCPCS | Performed by: NURSE PRACTITIONER

## 2022-05-12 PROCEDURE — G8427 DOCREV CUR MEDS BY ELIG CLIN: HCPCS | Performed by: NURSE PRACTITIONER

## 2022-05-12 PROCEDURE — 4040F PNEUMOC VAC/ADMIN/RCVD: CPT | Performed by: NURSE PRACTITIONER

## 2022-05-12 PROCEDURE — 1123F ACP DISCUSS/DSCN MKR DOCD: CPT | Performed by: NURSE PRACTITIONER

## 2022-05-12 PROCEDURE — 3017F COLORECTAL CA SCREEN DOC REV: CPT | Performed by: NURSE PRACTITIONER

## 2022-05-12 PROCEDURE — 1036F TOBACCO NON-USER: CPT | Performed by: NURSE PRACTITIONER

## 2022-05-12 RX ORDER — AMLODIPINE BESYLATE 10 MG/1
10 TABLET ORAL DAILY
Qty: 90 TABLET | Refills: 3 | Status: SHIPPED | OUTPATIENT
Start: 2022-05-12

## 2022-05-12 ASSESSMENT — PATIENT HEALTH QUESTIONNAIRE - PHQ9
SUM OF ALL RESPONSES TO PHQ QUESTIONS 1-9: 0
SUM OF ALL RESPONSES TO PHQ9 QUESTIONS 1 & 2: 0
SUM OF ALL RESPONSES TO PHQ QUESTIONS 1-9: 0
1. LITTLE INTEREST OR PLEASURE IN DOING THINGS: 0
2. FEELING DOWN, DEPRESSED OR HOPELESS: 0

## 2022-05-12 ASSESSMENT — LIFESTYLE VARIABLES
HOW OFTEN DO YOU HAVE A DRINK CONTAINING ALCOHOL: 2-3 TIMES A WEEK
HOW MANY STANDARD DRINKS CONTAINING ALCOHOL DO YOU HAVE ON A TYPICAL DAY: 1 OR 2

## 2022-05-12 NOTE — PROGRESS NOTES
Medicare Annual Wellness Visit    Eli Mann is here for Medicare AWV    Assessment & Plan   Medicare annual wellness visit, subsequent        Recommendations for Preventive Services Due: see orders and patient instructions/AVS.  Recommended screening schedule for the next 5-10 years is provided to the patient in written form: see Patient Instructions/AVS.     Return in 6 months (on 11/12/2022) for Medicare Annual Wellness Visit in 1 year. Subjective   Essential hypertension  - Normotensive  - he has met J[employer] standards.  - Continue current regimen. -     amLODIPine (NORVASC) 10 MG tablet; Take 1 tablet by mouth daily, Disp-90 tablet, R-3Normal  -     Comprehensive Metabolic Panel; Future  -     LIPID PANEL; Future  -     Hemoglobin A1C; Future  -     CBC with Auto Differential; Future  Mixed hyperlipidemia  -     Comprehensive Metabolic Panel; Future  -     LIPID PANEL; Future  -     Hemoglobin A1C; Future  -     CBC with Auto Differential; Future  Paroxysmal atrial fibrillation (HCC)  -     Comprehensive Metabolic Panel; Future  -     LIPID PANEL; Future  -     Hemoglobin A1C; Future  -     CBC with Auto Differential; Future  Impaired fasting glucose  -     Hemoglobin A1C; Future  Chronic gout of multiple sites, unspecified cause  - No gout flares  - Chronic, stable  - Continue current regimen  Prostate cancer screening  -     PSA Screening; Future  Colon cancer screening  -     AFL - Beatrice Best MD, Gastroenterology (ERCP & EUS), [facility]    Patient's complete Health Risk Assessment and screening values have been reviewed and are found in Flowsheets. The following problems were reviewed today and where indicated follow up appointments were made and/or referrals ordered.     Positive Risk Factor Screenings with Interventions:              Health Habits/Nutrition:     Physical Activity: Insufficiently Active    Days of Exercise per Week: 3 days    Minutes of Exercise per Session: 20 min Have you lost any weight without trying in the past 3 months?: No  Body mass index: (!) 27.61  Have you seen the dentist within the past year?: (!) No    Health Habits/Nutrition Interventions:  · Dental exam overdue:  patient encouraged to make appointment with his/her dentist    Hearing/Vision:  Do you or your family notice any trouble with your hearing that hasn't been managed with hearing aids?: (!) Yes  Do you have difficulty driving, watching TV, or doing any of your daily activities because of your eyesight?: No  Have you had an eye exam within the past year?: Yes  No exam data present    Hearing/Vision Interventions:  · Hearing concerns:  patient declines any further evaluation/treatment for hearing issues            Objective   Vitals:    05/12/22 0758   BP: 130/60   Pulse: 58   SpO2: 98%   Weight: 187 lb (84.8 kg)   Height: 5' 9\" (1.753 m)      Body mass index is 27.62 kg/m². Gen: NAD  Eyes: no icterus, no conjunctival erythema  CV: RRR, no mrgs  Resp: CTAB  Abd: soft, NTTP  Neuro: alert, oriented, answers questions appropriately  MSK: no peripheral edema  Skin: warm, dry  Psych: Normal mood, affect        Allergies   Allergen Reactions    Lisinopril Swelling     Prior to Visit Medications    Medication Sig Taking?  Authorizing Provider   amLODIPine (NORVASC) 10 MG tablet Take 1 tablet by mouth daily Yes AMANDO Card CNP   atenolol (TENORMIN) 50 MG tablet Take 1 tablet by mouth daily Yes AMANDO Card CNP   rivaroxaban (XARELTO) 20 MG TABS tablet TAKE ONE TABLET BY MOUTH DAILY WITH BREAKFAST Yes AMANDO Moreau CNP   allopurinol (ZYLOPRIM) 100 MG tablet Take 1 tablet by mouth daily Yes AMANDO Card CNP   atorvastatin (LIPITOR) 40 MG tablet Take 1 tablet by mouth daily Yes AMANDO Card CNP   venlafaxine (EFFEXOR) 75 MG tablet Take 0.5 tablets by mouth daily Yes AMANDO Card CNP   EPINEPHrine (EPIPEN 2-SHANTHI) 0.3 MG/0.3ML SOAJ injection Inject 0.3 mLs into the muscle once for 1 dose Use as directed for allergic reaction  Betsy Singer MD   colchicine (COLCRYS) 0.6 MG tablet Take 1 tablet by mouth daily  Betsy Singer MD   lisinopril (PRINIVIL;ZESTRIL) 40 MG tablet Take 1 tablet by mouth daily  AMANDO Burger CNP   mupirocin (BACTROBAN) 2 % ointment Apply twice daily to inside of nostrils for the next 2 weeks then as needed for nasal dryness.   DO Denita Salas (Including outside providers/suppliers regularly involved in providing care):   Patient Care Team:  AMANDO Burger CNP as PCP - General (Family Nurse Practitioner)  AMANDO Burger CNP as PCP - [facility] Empaneled Provider  Mick Simmons MD as Surgeon (General Surgery)  Joannie Osler, MD as Consulting Physician (Gastroenterology)    Reviewed and updated this visit:  Tobacco  Allergies  Meds  Med Hx  Surg Hx  Soc Hx  Fam Hx

## 2022-05-12 NOTE — PATIENT INSTRUCTIONS
Personalized Preventive Plan for Ivis Escalona - 5/12/2022  Medicare offers a range of preventive health benefits. Some of the tests and screenings are paid in full while other may be subject to a deductible, co-insurance, and/or copay. Some of these benefits include a comprehensive review of your medical history including lifestyle, illnesses that may run in your family, and various assessments and screenings as appropriate. After reviewing your medical record and screening and assessments performed today your provider may have ordered immunizations, labs, imaging, and/or referrals for you. A list of these orders (if applicable) as well as your Preventive Care list are included within your After Visit Summary for your review. Other Preventive Recommendations:    · A preventive eye exam performed by an eye specialist is recommended every 1-2 years to screen for glaucoma; cataracts, macular degeneration, and other eye disorders. · A preventive dental visit is recommended every 6 months. · Try to get at least 150 minutes of exercise per week or 10,000 steps per day on a pedometer . · Order or download the FREE \"Exercise & Physical Activity: Your Everyday Guide\" from [employer]. Call 7-625.190.1855 or search [employer] online. · You need 7522-5074 mg of calcium and 7265-9068 IU of vitamin D per day. It is possible to meet your calcium requirement with diet alone, but a vitamin D supplement is usually necessary to meet this goal.  · When exposed to the sun, use a sunscreen that protects against both UVA and UVB radiation with an SPF of 30 or greater. Reapply every 2 to 3 hours or after sweating, drying off with a towel, or swimming. · Always wear a seat belt when traveling in a car. Always wear a helmet when riding a bicycle or motorcycle.

## 2022-05-13 LAB
ESTIMATED AVERAGE GLUCOSE: 116.9 MG/DL
HBA1C MFR BLD: 5.7 %

## 2022-06-09 RX ORDER — ALLOPURINOL 100 MG/1
TABLET ORAL
Qty: 90 TABLET | Refills: 2 | Status: SHIPPED | OUTPATIENT
Start: 2022-06-09

## 2022-07-28 RX ORDER — VENLAFAXINE 75 MG/1
TABLET ORAL
Qty: 45 TABLET | Refills: 1 | Status: SHIPPED | OUTPATIENT
Start: 2022-07-28

## 2022-08-04 ENCOUNTER — TELEPHONE (OUTPATIENT)
Dept: CARDIOLOGY CLINIC | Age: 72
End: 2022-08-04

## 2022-09-05 DIAGNOSIS — I48.0 PAROXYSMAL ATRIAL FIBRILLATION (HCC): ICD-10-CM

## 2022-09-06 NOTE — TELEPHONE ENCOUNTER
Received refill request for Xarelto from [id number] [place] [address] Avenue : 7/7/20 RMM    Last Labs : 5/12/22 CMP     Last Refill : 10/15/21 90 w/2 refills     Next OV : 10/18/22 RMM

## 2022-10-03 RX ORDER — ATORVASTATIN CALCIUM 40 MG/1
TABLET, FILM COATED ORAL
Qty: 90 TABLET | Refills: 3 | Status: SHIPPED | OUTPATIENT
Start: 2022-10-03

## 2022-10-13 NOTE — PROGRESS NOTES
[facility]   Electrophysiology Follow up   Date: 10/18/2022  I had the privilege of visiting Chang Irvin in the office. CC: Follow up for Afib. HPI: Chang Irvin is a 71 y.o. male past medical history of CVA 08/05/2018 , paroxysmal atrial fibrillation, hypertension, hyperlipidemia, and sleep apnea (treated with cpap. MCOT 08/08/2018  - 09/06/2018 14% atrial fibrillation. Patient did not have symptoms. S/p 12/12/18- Pulmonary vein isolations using wide area circumferential radiofrequency ablation   - Additional ablation of CTI right atrial flutter     [facility]  07/07/2020 - No recurrence of atrial fibrillation. Queenie Chao presents today in routine follow up. He is doing well from a cardiac perspective. Patient denies lightheadedness, dizziness, chest pain, palpitations, orthopnea, edema, presyncope or syncope. He is stilling working part time. Assessment and plan:   - Paroxysmal atrial fibrillation    ekg today  sinus bradycardia    Denies any recurrence    Continue atenolol   S/p RFA atrial fibrillation with PVI,CTI dependent flutter 2018    Patient has a [id number]-HCTm Score of 4( age, CVA, HTN)     ~ Continue xarelto 20 mg daily     ~ creatinine clearance   68 ml/min  based on creatinine of 1.2 05/12/2022    - Afib risk factors including age, HTN, obesity, inactivity and GUERRERO were discussed with patient. Risk factor modification recommended        - CVA 08/2018    - likely secondary to afib   - needs lifelong AC   - initially on Plavix, changed to xarelto with AFib diagnosis    - continue statin   Follows with PCP     - HTN  - He states he is lower at his PCP - He does not monitor at home. White coat hypertension.      -BP goal <130/80  -Home BP monitoring encouraged, printed information provided on how to accurately measure BP at home.  -Counseled to follow a low salt diet to assure blood pressure remains controlled for cardiovascular risk factor modification.   -The patient is counseled to get regular exercise 3-5 times per week and maintain a healthy weight reduce cardiovascular risk factors. - continue atenolol, norvasc   Follows with PCP         Patient Active Problem List    Diagnosis Date Noted    Mixed hyperlipidemia 05/11/2021    Typical atrial flutter (HCC)     Tachy-delroy syndrome ([facility] [address] 09/26/2018    Paroxysmal atrial fibrillation ([address] 08/29/2018    Cerebral infarction due to embolism of right posterior cerebral artery (HCC)     Homonymous hemianopia, left 08/05/2018    Colonic mass;  benign     Essential hypertension 10/10/2011     Diagnostic studies:   ECG 10/18/22  Sinus Delroy   421 QTcH, 86 QRS    48 hour Holter: 4/4-4/7/19  Sinus rhythm/sinus delroy  Brief PAT  High hr 147  Low HR 46  Average HR 61     Echo:  8/6/18   Summary   -Normal left ventricle size, wall thickness and systolic function with an   estimated ejection fraction of 55-60%. -No regional wall motion abnormalities are seen. -Mild-moderate aortic regurgitation.   -Mild mitral regurgitation.   -Mild tricuspid regurgitation with a estimated RVSP of 43 mmHg. -Moderate pulmonic regurgitation present.   -The right ventricle is borderline enlarged.   -A bubble study was performed and fails to show evidence of shunting. -IVC size is normal (<2.1 cm) but collapses < 50% with respiration.   -Normal diastolic function. BRI: 12/12/18  Summary   Normal left ventricle size, wall thickness, and systolic function with an   estimated ejection fraction of 55-60%. No regional wall motion abnormalities   are seen. Mild mitral valve leaflet thickening. There is mild mitral regurgitation. Mild to moderate aortic regurgitation. BRI:  8/7/18   Summary   -Normal left ventricle size, wall thickness, and systolic function with an   estimated ejection fraction of 55-60%. -The mitral valve has non specific thickening. There is mild-moderate mitral   regurgitation with multiple jets.    -Mildly dilated left atrium.   -There is no evidence of mass or thrombus in the left atrium or appendage.   -There is mild aortic sclerosis. -Mild aortic regurgitation.   -There is mild plaque in the thoracic aorta.   -A bubble study was performed and fails to show evidence of shunting.   -No cardiac source of emboli detected. Nuclear stress:  10/2/18  Summary   Normal myocardial perfusion. Normal LV size and systolic function. Rapid atrial fib with exercise and 2 mm ST depression. Asymptomatic. I independently reviewed the cardiac diagnostic studies, ECG and relevant imaging studies. Lab Results   Component Value Date    LVEF 58 12/12/2018    LVEFMODE Echo 08/06/2018     Lab Results   Component Value Date    TSHFT4 2.25 10/19/2017    TSH 2.54 12/09/2014         Physical Examination:  Vitals:    10/18/22 0814   BP: (!) 148/74   Pulse:    SpO2:       Wt Readings from Last 3 Encounters:   10/18/22 189 lb 12.8 oz (86.1 kg)   05/12/22 187 lb (84.8 kg)   03/03/22 189 lb (85.7 kg)       Constitutional: Oriented. No distress. Head: Normocephalic and atraumatic. Mouth/Throat: Oropharynx is clear and moist.   Eyes: Conjunctivae normal. EOM are normal.   Neck: Neck supple. No JVD present. Cardiovascular: Normal rate, regular rhythm, S1&S2. Pulmonary/Chest: Bilateral respiratory sounds. No rhonchi. Abdominal: Soft. No tenderness. Musculoskeletal: No tenderness. No edema    Lymphadenopathy: Has no cervical adenopathy. Neurological: Alert and oriented. Follows command, No Gross deficit   Skin: Skin is warm, No rash noted. Psychiatric: Has a normal behavior        Review of System:  [x] Full ROS obtained and negative except as mentioned in HPI    Prior to Admission medications    Medication Sig Start Date End Date Taking?  Authorizing Provider   atorvastatin (LIPITOR) 40 MG tablet TAKE ONE TABLET BY MOUTH DAILY 10/3/22  Yes Annabel Brain, APRN - CNP   rivaroxaban (XARELTO) 20 MG TABS tablet TAKE ONE TABLET BY MOUTH DAILY WITH BREAKFAST 9/6/22  Yes Brianda Jimenez, APRN - CNP   venlafaxine (EFFEXOR) 75 MG tablet TAKE 1/2 TABLET BY MOUTH DAILY 7/28/22  Yes Frances Balderas, APRN - CNP   allopurinol (ZYLOPRIM) 100 MG tablet TAKE ONE TABLET BY MOUTH DAILY 6/9/22  Yes Frances Balderas, APRN - CNP   amLODIPine (NORVASC) 10 MG tablet Take 1 tablet by mouth daily 5/12/22  Yes Frances Balderas, APRN - CNP   atenolol (TENORMIN) 50 MG tablet Take 1 tablet by mouth daily 1/7/22  Yes Frances Balderas, APRN - CNP   lisinopril (PRINIVIL;ZESTRIL) 40 MG tablet Take 1 tablet by mouth daily 8/17/21 11/12/21  Frances Balderas, APRN - CNP       Allergies   Allergen Reactions    Lisinopril Swelling       Social History:  Reviewed. reports that he has never smoked. He has never used smokeless tobacco. He reports current alcohol use. He reports that he does not use drugs. Family History:  Reviewed. Reviewed. No family history of SCD. Relevant and available labs, and cardiovascular diagnostics reviewed. Reviewed. I independently reviewed relevant and available cardiac diagnostic tests ECG, CXR, Echo, Stress test, Device interrogation, Holter, CT scan. Complex medical condition with multiple medical problems affecting prognosis and outcome of EP interventions    - The patient is counseled to follow a low salt diet to assure blood pressure remains controlled for cardiovascular risk factor modification.   - The patient is counseled to avoid excess caffeine, and energy drinks as this may exacerbated ectopy and arrhythmia. - The patient is counseled to get regular exercise 3-5 times per week to control cardiovascular risk factors. - The patient is counseled to lose weigt to control cardiovascular risk factors. All questions and concerns were addressed to the patient/family. Alternatives to my treatment were discussed.  I have discussed the above stated plan and the patient verbalized understanding and agreed with the plan. Scribe attestation: This note was scribed in the presence of Sean Head MD by Alana Mcgregor RN  Physician Attestation: I, Dr. Sean Head, confirm that the scribe's documentation has been prepared under my direction and personally reviewed by me in its entirety. I also confirm that the note above accurately reflects all work, treatment, procedures, and medical decision making performed by me. NOTE: This report was transcribed using voice recognition software. Every effort was made to ensure accuracy, however, inadvertent computerized transcription errors may be present.      Sean Head MD, MPH  [facility]   Office: [phone]  Fax: [phone]

## 2022-10-18 ENCOUNTER — OFFICE VISIT (OUTPATIENT)
Dept: CARDIOLOGY CLINIC | Age: 72
End: 2022-10-18
Payer: MEDICARE

## 2022-10-18 VITALS
BODY MASS INDEX: 28.11 KG/M2 | WEIGHT: 189.8 LBS | SYSTOLIC BLOOD PRESSURE: 148 MMHG | DIASTOLIC BLOOD PRESSURE: 74 MMHG | HEART RATE: 55 BPM | OXYGEN SATURATION: 99 % | HEIGHT: 69 IN

## 2022-10-18 DIAGNOSIS — I48.0 PAROXYSMAL ATRIAL FIBRILLATION (HCC): ICD-10-CM

## 2022-10-18 DIAGNOSIS — I10 ESSENTIAL HYPERTENSION: Primary | ICD-10-CM

## 2022-10-18 DIAGNOSIS — I63.431 CEREBRAL INFARCTION DUE TO EMBOLISM OF RIGHT POSTERIOR CEREBRAL ARTERY (HCC): ICD-10-CM

## 2022-10-18 PROCEDURE — 1036F TOBACCO NON-USER: CPT | Performed by: INTERNAL MEDICINE

## 2022-10-18 PROCEDURE — 1123F ACP DISCUSS/DSCN MKR DOCD: CPT | Performed by: INTERNAL MEDICINE

## 2022-10-18 PROCEDURE — 99214 OFFICE O/P EST MOD 30 MIN: CPT | Performed by: INTERNAL MEDICINE

## 2022-10-18 PROCEDURE — G8417 CALC BMI ABV UP PARAM F/U: HCPCS | Performed by: INTERNAL MEDICINE

## 2022-10-18 PROCEDURE — G8427 DOCREV CUR MEDS BY ELIG CLIN: HCPCS | Performed by: INTERNAL MEDICINE

## 2022-10-18 PROCEDURE — 3017F COLORECTAL CA SCREEN DOC REV: CPT | Performed by: INTERNAL MEDICINE

## 2022-10-18 PROCEDURE — G8484 FLU IMMUNIZE NO ADMIN: HCPCS | Performed by: INTERNAL MEDICINE

## 2022-10-18 PROCEDURE — 93000 ELECTROCARDIOGRAM COMPLETE: CPT | Performed by: INTERNAL MEDICINE

## 2022-11-14 ENCOUNTER — OFFICE VISIT (OUTPATIENT)
Dept: INTERNAL MEDICINE CLINIC | Age: 72
End: 2022-11-14
Payer: MEDICARE

## 2022-11-14 VITALS
WEIGHT: 186.6 LBS | OXYGEN SATURATION: 99 % | HEART RATE: 63 BPM | BODY MASS INDEX: 27.64 KG/M2 | HEIGHT: 69 IN | TEMPERATURE: 98.1 F | SYSTOLIC BLOOD PRESSURE: 136 MMHG | DIASTOLIC BLOOD PRESSURE: 72 MMHG

## 2022-11-14 DIAGNOSIS — Z23 NEED FOR INFLUENZA VACCINATION: ICD-10-CM

## 2022-11-14 DIAGNOSIS — M79.675 PAIN OF TOE OF LEFT FOOT: Primary | ICD-10-CM

## 2022-11-14 DIAGNOSIS — I48.0 PAROXYSMAL ATRIAL FIBRILLATION (HCC): ICD-10-CM

## 2022-11-14 DIAGNOSIS — Z12.11 SCREENING FOR COLON CANCER: ICD-10-CM

## 2022-11-14 DIAGNOSIS — R73.01 IMPAIRED FASTING GLUCOSE: ICD-10-CM

## 2022-11-14 DIAGNOSIS — E78.2 MIXED HYPERLIPIDEMIA: ICD-10-CM

## 2022-11-14 DIAGNOSIS — I10 ESSENTIAL HYPERTENSION: ICD-10-CM

## 2022-11-14 PROCEDURE — 99214 OFFICE O/P EST MOD 30 MIN: CPT | Performed by: NURSE PRACTITIONER

## 2022-11-14 PROCEDURE — 90694 VACC AIIV4 NO PRSRV 0.5ML IM: CPT | Performed by: NURSE PRACTITIONER

## 2022-11-14 PROCEDURE — G0008 ADMIN INFLUENZA VIRUS VAC: HCPCS | Performed by: NURSE PRACTITIONER

## 2022-11-14 PROCEDURE — G8484 FLU IMMUNIZE NO ADMIN: HCPCS | Performed by: NURSE PRACTITIONER

## 2022-11-14 PROCEDURE — G8417 CALC BMI ABV UP PARAM F/U: HCPCS | Performed by: NURSE PRACTITIONER

## 2022-11-14 PROCEDURE — 3074F SYST BP LT 130 MM HG: CPT | Performed by: NURSE PRACTITIONER

## 2022-11-14 PROCEDURE — 3017F COLORECTAL CA SCREEN DOC REV: CPT | Performed by: NURSE PRACTITIONER

## 2022-11-14 PROCEDURE — 1036F TOBACCO NON-USER: CPT | Performed by: NURSE PRACTITIONER

## 2022-11-14 PROCEDURE — G8427 DOCREV CUR MEDS BY ELIG CLIN: HCPCS | Performed by: NURSE PRACTITIONER

## 2022-11-14 PROCEDURE — 3078F DIAST BP <80 MM HG: CPT | Performed by: NURSE PRACTITIONER

## 2022-11-14 PROCEDURE — 1123F ACP DISCUSS/DSCN MKR DOCD: CPT | Performed by: NURSE PRACTITIONER

## 2022-11-14 SDOH — ECONOMIC STABILITY: FOOD INSECURITY: WITHIN THE PAST 12 MONTHS, THE FOOD YOU BOUGHT JUST DIDN'T LAST AND YOU DIDN'T HAVE MONEY TO GET MORE.: NEVER TRUE

## 2022-11-14 SDOH — ECONOMIC STABILITY: FOOD INSECURITY: WITHIN THE PAST 12 MONTHS, YOU WORRIED THAT YOUR FOOD WOULD RUN OUT BEFORE YOU GOT MONEY TO BUY MORE.: NEVER TRUE

## 2022-11-14 ASSESSMENT — SOCIAL DETERMINANTS OF HEALTH (SDOH): HOW HARD IS IT FOR YOU TO PAY FOR THE VERY BASICS LIKE FOOD, HOUSING, MEDICAL CARE, AND HEATING?: NOT HARD AT ALL

## 2022-11-14 ASSESSMENT — ENCOUNTER SYMPTOMS
GASTROINTESTINAL NEGATIVE: 1
RESPIRATORY NEGATIVE: 1

## 2022-12-05 ENCOUNTER — TELEPHONE (OUTPATIENT)
Dept: CARDIOLOGY CLINIC | Age: 72
End: 2022-12-05

## 2022-12-05 NOTE — PROGRESS NOTES
Patient __X_ reached   _____not reached-preop instructions left on voice mail_____________      DATE__12/9/22______ TIME__0735______ARRIVAL_0600  FEC________      Nothing to eat or drink after midnight the night before,except for what the prep instructions call for. If you do not have the instructions or do not understand them please contact your doctors office. Follow any instructions your doctors office has given you including what medications to take the AM of your procedure and which ones to hold. You may use your inhalers - bring rescue inhalers with you DOS. If you take a long acting insulin the talisha prior please cut the dose in half and take no diabetic medications that AM.Follow specific doctors office instructions regarding blood thinners and if they want you to hold and for how long. If you are on a blood thinner and have no instructions please contact the office and ask-CHECK Olivia Kill comfortably,bring your insurance card,picture ID,and a complete list of medications, including supplements. You must have a responsible adult to stay with you during the procedure,drive you home and stay with you. [facility] phone number [phone] for any questions. OTHER INSTRUCTIONS(if applicable)___take atenolol, amlodipine am of procedure______________________________________________________        VISITOR POLICY(subject to change)    Current visitor policy is 2 visitors per patient. No children allowed. Mask at discretion of facility. Visiting hours are 8a-8p. Overnight visitors will be at the discretion of the nurse. All policies are subject to change.

## 2022-12-05 NOTE — LETTER
[facility] CARDIOLOGY[address]  Dept: [phone]  Dept Fax: [phone] [facility]  1950  12/5/22      To Whom It May Concern:      Erica Goldman has been seen in the electrophysiology office for paroxysmal atrial fibrillation     He is  already on Beta blocer  therapy. There are no apparent interventions to ameliorate the cardiac risk. From an electrophysiology standpoint he may proceed with colonoscopy. This clinical assessment assumes a full anesthesia/pulmonary/internal medicine evaluation for overall risk of airway management, type and route of anesthetic, and other relevant anesthesia-specific considerations. Discussion about risks, benefits and alternative of procedure per surgical team.     Discontinuation of any anticoagulant carries significant risks of morbidity, sometimes with a fatal outcome (e.g. stroke), from thromboembolic complications. Guidelines do not recommend discontinuation of anticoagulation for low risk surgical procedure, such as cataract surgery, dental procedures, and dermatologic surgeries. The risks of hemorrhage or thromboembolism versus the benefit from the operation need to be addressed by attending surgeon. Xarelto: \"If anticoagulation must be discontinued to reduce the risk of bleeding with surgical or other procedures, [place] should be stopped at least 24 hours before the procedure to reduce the risk of bleeding. In deciding whether a procedure should be delayed until 24 hours after the last dose of XARELTO, the increased risk of bleeding should be weighed against the urgency of intervention. [place] should be restarted after the surgical or other procedures as soon as adequate hemostasis has been established, noting that the time to onset of  therapeutic effect is short.  If oral medication cannot be taken during or after surgical intervention, consider administering a parenteral anticoagulant. \"        Gloria Swain may hold Xarelto for 2 days prior to his scheduled procedure. If more days are desired, it is up to the requesting physician to appropriately bridge the patient with an alternative medication. Please limit time off anticoagulation and resume medication as soon as possible. Please contact my office with any further questions or concerns.          Sincerely,        Halley Lopez MD

## 2022-12-05 NOTE — TELEPHONE ENCOUNTER
CARDIAC CLEARANCE REQUEST    What type of procedure are you having: Colonoscopy    Are you taking any blood thinners: Xarelto    When is your procedure scheduled for:12/9/22    What physician is performing your procedure: Dr. Adrienne Rudolph    Phone RGEFHJ:[phone]    Fax number to send the letter:  [phone]    pjp

## 2022-12-08 DIAGNOSIS — I48.0 PAROXYSMAL ATRIAL FIBRILLATION (HCC): ICD-10-CM

## 2022-12-09 ENCOUNTER — ANESTHESIA EVENT (OUTPATIENT)
Dept: ENDOSCOPY | Age: 72
End: 2022-12-09
Payer: MEDICARE

## 2022-12-09 ENCOUNTER — HOSPITAL ENCOUNTER (OUTPATIENT)
Age: 72
Setting detail: OUTPATIENT SURGERY
Discharge: HOME | End: 2022-12-09
Attending: INTERNAL MEDICINE | Admitting: INTERNAL MEDICINE
Payer: MEDICARE

## 2022-12-09 ENCOUNTER — ANESTHESIA (OUTPATIENT)
Dept: ENDOSCOPY | Age: 72
End: 2022-12-09
Payer: MEDICARE

## 2022-12-09 VITALS
DIASTOLIC BLOOD PRESSURE: 67 MMHG | OXYGEN SATURATION: 99 % | HEIGHT: 69 IN | RESPIRATION RATE: 16 BRPM | TEMPERATURE: 97.8 F | BODY MASS INDEX: 26.66 KG/M2 | HEART RATE: 50 BPM | SYSTOLIC BLOOD PRESSURE: 119 MMHG | WEIGHT: 180 LBS

## 2022-12-09 DIAGNOSIS — Z12.11 COLON CANCER SCREENING: ICD-10-CM

## 2022-12-09 PROCEDURE — 3700000000 HC ANESTHESIA ATTENDED CARE: Performed by: INTERNAL MEDICINE

## 2022-12-09 PROCEDURE — 7100000011 HC PHASE II RECOVERY - ADDTL 15 MIN: Performed by: INTERNAL MEDICINE

## 2022-12-09 PROCEDURE — 2580000003 HC RX 258: Performed by: ANESTHESIOLOGY

## 2022-12-09 PROCEDURE — 3700000001 HC ADD 15 MINUTES (ANESTHESIA): Performed by: INTERNAL MEDICINE

## 2022-12-09 PROCEDURE — 2709999900 HC NON-CHARGEABLE SUPPLY: Performed by: INTERNAL MEDICINE

## 2022-12-09 PROCEDURE — 88305 TISSUE EXAM BY PATHOLOGIST: CPT

## 2022-12-09 PROCEDURE — 3609010600 HC COLONOSCOPY POLYPECTOMY SNARE/COLD BIOPSY: Performed by: INTERNAL MEDICINE

## 2022-12-09 PROCEDURE — 7100000010 HC PHASE II RECOVERY - FIRST 15 MIN: Performed by: INTERNAL MEDICINE

## 2022-12-09 PROCEDURE — 2500000003 HC RX 250 WO HCPCS: Performed by: NURSE ANESTHETIST, CERTIFIED REGISTERED

## 2022-12-09 PROCEDURE — 6360000002 HC RX W HCPCS: Performed by: NURSE ANESTHETIST, CERTIFIED REGISTERED

## 2022-12-09 RX ORDER — SODIUM CHLORIDE 9 MG/ML
INJECTION, SOLUTION INTRAVENOUS CONTINUOUS
Status: DISCONTINUED | OUTPATIENT
Start: 2022-12-09 | End: 2022-12-09 | Stop reason: HOSPADM

## 2022-12-09 RX ORDER — LIDOCAINE HYDROCHLORIDE 20 MG/ML
INJECTION, SOLUTION INFILTRATION; PERINEURAL PRN
Status: DISCONTINUED | OUTPATIENT
Start: 2022-12-09 | End: 2022-12-09 | Stop reason: SDUPTHER

## 2022-12-09 RX ORDER — PROPOFOL 10 MG/ML
INJECTION, EMULSION INTRAVENOUS PRN
Status: DISCONTINUED | OUTPATIENT
Start: 2022-12-09 | End: 2022-12-09 | Stop reason: SDUPTHER

## 2022-12-09 RX ORDER — PROPOFOL 10 MG/ML
INJECTION, EMULSION INTRAVENOUS CONTINUOUS PRN
Status: DISCONTINUED | OUTPATIENT
Start: 2022-12-09 | End: 2022-12-09 | Stop reason: SDUPTHER

## 2022-12-09 RX ADMIN — PROPOFOL 140 MCG/KG/MIN: 10 INJECTION, EMULSION INTRAVENOUS at 07:33

## 2022-12-09 RX ADMIN — PROPOFOL 50 MG: 10 INJECTION, EMULSION INTRAVENOUS at 07:33

## 2022-12-09 RX ADMIN — SODIUM CHLORIDE: 9 INJECTION, SOLUTION INTRAVENOUS at 06:50

## 2022-12-09 RX ADMIN — LIDOCAINE HYDROCHLORIDE 60 MG: 20 INJECTION, SOLUTION INFILTRATION; PERINEURAL at 07:33

## 2022-12-09 ASSESSMENT — ENCOUNTER SYMPTOMS: SHORTNESS OF BREATH: 0

## 2022-12-09 ASSESSMENT — PAIN - FUNCTIONAL ASSESSMENT: PAIN_FUNCTIONAL_ASSESSMENT: NONE - DENIES PAIN

## 2022-12-09 NOTE — ANESTHESIA POSTPROCEDURE EVALUATION
Department of Anesthesiology  Postprocedure Note    Patient: Radha Shaw  MRN: [id number]  YOB: 1950  Date of evaluation: 12/9/2022      Procedure Summary     Date: 12/09/22 Room / Location: [address]    Anesthesia Start: 9197 Anesthesia Stop: 2804    Procedure: COLONOSCOPY POLYPECTOMY SNARE/COLD BIOPSY Diagnosis:       Colon cancer screening      (Colon cancer screening [Z12.11])    Surgeons: Mariya Martinez MD Responsible Provider: Colletta Flesher, MD    Anesthesia Type: MAC ASA Status: 3          Anesthesia Type: No value filed. Pedro Phase I: Pedro Score: 10    Pedro Phase II:        Anesthesia Post Evaluation    Patient location during evaluation: PACU  Level of consciousness: awake  Airway patency: patent  Complications: no  Cardiovascular status: hemodynamically stable  Respiratory status: acceptable  There was medical reason for not using a multimodal analgesia pain management approach.

## 2022-12-09 NOTE — H&P
Gastroenterology Note             Pre-operative History and Physical    Patient: Guillaume Schulz  : 1950  CSN:     History Obtained From:  patient and/or guardian. HISTORY OF PRESENT ILLNESS:    The patient is a 71 y.o. 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 Past Medical History:    Past Medical History:   Diagnosis Date    Atrial fibrillation ([address]     Cerebral artery occlusion with cerebral infarction [facility])     Colonic mass     benign    Hyperlipidemia     Hypertension     Sleep apnea     uses c-pap     Past Surgical History:    Past Surgical History:   Procedure Laterality Date    APPENDECTOMY      ATRIAL ABLATION SURGERY  2018    Performed by Dr. Sue Shelby. COLONOSCOPY  2009    HEMICOLECTOMY  10/21/2016     OPEN RIGHT HEMICOLECTOMY                  REVISION TOTAL KNEE ARTHROPLASTY Bilateral 2020    TONSILLECTOMY       Medications Prior to Admission:   No current facility-administered medications on file prior to encounter.      Current Outpatient Medications on File Prior to Encounter   Medication Sig Dispense Refill    atorvastatin (LIPITOR) 40 MG tablet TAKE ONE TABLET BY MOUTH DAILY 90 tablet 3    rivaroxaban (XARELTO) 20 MG TABS tablet TAKE ONE TABLET BY MOUTH DAILY WITH BREAKFAST 90 tablet 0    venlafaxine (EFFEXOR) 75 MG tablet TAKE 1/2 TABLET BY MOUTH DAILY 45 tablet 1    allopurinol (ZYLOPRIM) 100 MG tablet TAKE ONE TABLET BY MOUTH DAILY 90 tablet 2    amLODIPine (NORVASC) 10 MG tablet Take 1 tablet by mouth daily 90 tablet 3    atenolol (TENORMIN) 50 MG tablet Take 1 tablet by mouth daily 90 tablet 3    [DISCONTINUED] lisinopril (PRINIVIL;ZESTRIL) 40 MG tablet Take 1 tablet by mouth daily 90 tablet 3        Allergies:  Lisinopril      Social History:   Social History     Tobacco Use    Smoking status: Never    Smokeless tobacco: Never   Substance Use Topics Alcohol use: Yes     Alcohol/week: 1.0 standard drink     Types: 1 Cans of beer per week     Comment: occasionally     Family History:   Family History   Problem Relation Age of Onset    High Blood Pressure Mother     High Blood Pressure Father     Coronary Art Dis Brother        PHYSICAL EXAM:      /64   Pulse 53   Temp 98.1 °F (36.7 °C) (Temporal)   Resp 14   Ht 5' 9\" (1.753 m)   Wt 180 lb (81.6 kg)   SpO2 99%   BMI 26.58 kg/m²  I        Heart:   RRR, normal s1s2    Lungs:  CTA bilat,  Normal effort    Abdomen:   NT, ND      ASA Grade:  ASA 3 - Patient with moderate systemic disease with functional limitations    Mallampati Class:  Class I: Soft palate, uvula, fauces, pillars visible  ____X_____  Class II: Soft palate, uvula, fauces visible  __________   Class III: Soft palate, base of uvula visible  ____X_____  Class IV: Hard palate only visible   __________        ASSESSMENT AND PLAN:    1. Patient is a 71 y.o. male here for colonoscopy with MAC.   2.  Procedure options, risks and benefits reviewed with patient. Patient expresses understanding.      Carmela Ma MD  [address]  12/9/2022

## 2022-12-09 NOTE — BRIEF OP NOTE
Brief Postoperative Note - Full Note in Chart Review/Procedures tab       Patient: Neida Habermann  YOB: 1950  MRN: [id number]    Date of Procedure: 12/9/2022    Pre-Op Diagnosis: Colon cancer screening [Z12.11]    Post-Op Diagnosis: Same       Procedure(s):  COLONOSCOPY POLYPECTOMY SNARE/COLD BIOPSY    Surgeon(s):  Mike Hurtado MD    Assistant:  * No surgical staff found *    Anesthesia: Monitor Anesthesia Care    Estimated Blood Loss (mL): Minimal    Complications: None    Specimens:   ID Type Source Tests Collected by Time Destination   A : descending colon polyp x2 Tissue Colon SURGICAL PATHOLOGY Tommie Shi RN 12/9/2022 0747        Implants:  * No implants in log *      Drains: * No LDAs found *    Findings:   Two polyps 3 mm and 6 mm in descending colon removed with cold snare  Diverticulosis of large intestine - K57.30  S/p right hemicolectomy with ileocolonic anastomosis    Rec:  Resume diet  Continue present treatment. Resume Xarelto tomorrow  Await pathology results.    F/U WITH ME AS NEEDED  Colonoscopy in 5 years  Followup with referring physician as previously instucted    Electronically signed by Mike Hurtado MD on 12/9/2022 at 7:57 AM

## 2022-12-09 NOTE — DISCHARGE INSTRUCTIONS
COLONSCOPY DISCHARGE INSTRUCTIONS    You may experience some lightheadedness for the next several hours. Plan on quiet relaxation for the rest of today. Nap for four hours following procedure if possible. A responsible adult needs to stay with you today. Eat bland food and avoid anything greasy or spicy initially-progress to your normal diet gradually. Diet restrictions as instructed. You may resume home medications as instructed. It is not unusual to experience some mild cramping or gas pains, and you may not have a bowel movement for several days. If you had a polyp removed, avoid strenuous activity for 48 hours. Avoid the use of aspirin or related compounds for one week, unless otherwise instructed by your physician. You may notice a small amount of blood in your next few bowel movements, but if a large amount passes, call your physician. If you have any of the following problems, notify your physician or return to the hospital emergency room : fever, chills, excessive bleeding, excessive vomiting, difficulty swallowing, uncontrolled pain, increased abdominal distention, shortness of breath or any other problems. Call your doctor at [phone] if you have any concerns. If you had any biopsies or polyps call for results in 5-7 business days. See your physician's report for details about your procedure and recommendations. ANESTHESIA DISCHARGE INSTRUCTIONS    Wear your seatbelt home. You are under the influence of drugs-do not drink alcohol, drive ,operate machinery,or make any important decisions or sign any legal documentsfor 24 hours. You may resume normal activities tomorrow. A responsible adult needs to be with you for 24 hours. You may experience lightheadedness,dizziness,or sleepiness following surgery. Rest at home today- increase activity as tolerated. It is recommended to take a four hour nap after procedure.   Progress slowly to a regular diet unless your physician has instructed you otherwise. Avoid spicy and greasy food on first meal.  Drink plenty of water. If nausea becomes a problem call your physician. Call your doctor if concerns arise. High-Fiber Diet: Care Instructions  Overview     A high-fiber diet may help you relieve constipation and feel less bloated. Your doctor and dietitian will help you make a high-fiber eating plan based on your personal needs. The plan will include the things you like to eat. It will also make sure that you get 25 to 35 grams of fiber a day. Before you make changes to the way you eat, be sure to talk with your doctor or dietitian. Follow-up care is a key part of your treatment and safety. Be sure to make and go to all appointments, and call your doctor if you are having problems. It's also a good idea to know your test results and keep a list of the medicines you take. How can you care for yourself at home? You can increase how much fiber you get if you eat more of certain foods. These foods include:  Whole-grain breads and cereals. Fruits, such as pears, apples, and peaches. Eat the skins and peels if you can. Vegetables, such as broccoli, cabbage, spinach, carrots, asparagus, and squash. Starchy vegetables. These include potatoes with skins, kidney beans, and lima beans. Take a fiber supplement every day if your doctor recommends it. Examples are Benefiber, Citrucel, FiberCon, and Metamucil. Ask your doctor how much to take. Drink plenty of fluids. If you have kidney, heart, or liver disease and have to limit fluids, talk with your doctor before you increase the amount of fluids you drink. Where can you learn more? Go to http://www.woods.com/ and enter O264 to learn more about \"High-Fiber Diet: Care Instructions. \"  Current as of: May 9, 2022               Content Version: 13.5  © 4465-0032 [employer], [employer]. Care instructions adapted under license by [facility] ([facility]).  If you have questions about a medical condition or this instruction, always ask your healthcare professional. [address] any warranty or liability for your use of this information. Diverticulosis: Care Instructions  Your Care Instructions  In diverticulosis, pouches called diverticula form in the wall of the large intestine (colon). The pouches do not cause any pain or other symptoms. Most people who have diverticulosis do not know they have it. But the pouches sometimes bleed, and if they become infected, they can cause pain and other symptoms. When this happens, it is called diverticulitis. Diverticula form when pressure pushes the wall of the colon outward at certain weak points. A diet that is too low in fiber can cause diverticula. Follow-up care is a key part of your treatment and safety. Be sure to make and go to all appointments, and call your doctor if you are having problems. It's also a good idea to know your test results and keep a list of the medicines you take. How can you care for yourself at home? Include fruits, leafy green vegetables, beans, and whole grains in your diet each day. These foods are high in fiber. Take a fiber supplement, such as Citrucel or Metamucil, every day if needed. Read and follow all instructions on the label. Drink plenty of fluids. If you have kidney, heart, or liver disease and have to limit fluids, talk with your doctor before you increase the amount of fluids you drink. Get at least 30 minutes of exercise on most days of the week. Walking is a good choice. You also may want to do other activities, such as running, swimming, cycling, or playing tennis or team sports. Cut out foods that cause gas, pain, or other symptoms. When should you call for help? Call your doctor now or seek immediate medical care if:    You have belly pain. You pass maroon or very bloody stools. You have a fever. You have nausea and vomiting.      You have unusual changes in your bowel movements or abdominal swelling. You have burning pain when you urinate. You have abnormal vaginal discharge. You have shoulder pain. You have cramping pain that does not get better when you have a bowel movement or pass gas. You pass gas or stool from your urethra while urinating. Watch closely for changes in your health, and be sure to contact your doctor if you have any problems. Where can you learn more? Go to http://www.woods.com/ and enter K072 to learn more about \"Diverticulosis: Care Instructions. \"  Current as of: June 6, 2022               Content Version: 13.5  © 2006-2022 [employer]. Care instructions adapted under license by [facility] ([facility]). If you have questions about a medical condition or this instruction, always ask your healthcare professional. [address] any warranty or liability for your use of this information. Colon Polyps: Care Instructions  Your Care Instructions     Colon polyps are growths in the colon or the rectum. The cause of most colon polyps is not known, and most people who get them do not have any problems. But a certain kind can turn into cancer. For this reason, regular testing for colon polyps is important for people as they get older. It is also important for anyone who has an increased risk for colon cancer. Polyps are usually found through routine colon cancer screening tests. Although most colon polyps are not cancerous, they are usually removed and then tested for cancer. Screening for colon cancer saves lives because the cancer can usually be cured if it is caught early. If you have a polyp that is the type that can turn into cancer, you may need more tests to examine your entire colon. The doctor will remove any other polyps that are found, and you will be tested more often. Follow-up care is a key part of your treatment and safety.  Be sure to make and go to all appointments, and call your doctor if you are having problems. It's also a good idea to know your test results and keep a list of the medicines you take. How can you care for yourself at home? Regular exams to look for colon polyps are the best way to prevent polyps from turning into colon cancer. These can include stool tests, sigmoidoscopy, colonoscopy, and CT colonography. Talk with your doctor about a testing schedule that is right for you. To prevent polyps  There is no home treatment that can prevent colon polyps. But these steps may help lower your risk for cancer. Stay active. Being active can help you get to and stay at a healthy weight. Try to exercise on most days of the week. Walking is a good choice. Eat well. Choose a variety of vegetables, fruits, legumes (such as peas and beans), fish, poultry, and whole grains. Do not smoke. If you need help quitting, talk to your doctor about stop-smoking programs and medicines. These can increase your chances of quitting for good. If you drink alcohol, limit how much you drink. Limit alcohol to 2 drinks a day for men and 1 drink a day for women. When should you call for help? Call your doctor now or seek immediate medical care if:    You have severe belly pain. Your stools are maroon or very bloody. Watch closely for changes in your health, and be sure to contact your doctor if:    You have a fever. You have nausea or vomiting. You have a change in bowel habits (new constipation or diarrhea). Your symptoms get worse or are not improving as expected. Where can you learn more? Go to http://www.woods.com/ and enter C571 to learn more about \"Colon Polyps: Care Instructions. \"  Current as of: June 6, 2022               Content Version: 13.5  © 2063-8421 Healthwise, [employer]. Care instructions adapted under license by [facility] ([facility]).  If you have questions about a medical condition or this instruction, always ask your healthcare professional. [address] any warranty or liability for your use of this information.

## 2022-12-09 NOTE — ANESTHESIA PRE PROCEDURE
Department of Anesthesiology  Preprocedure Note       Name:  Hollie Lane   Age:  71 y.o.  :  1950                                          MRN:  [id number]         Date:  2022      Surgeon: Mari Dale):  Supa Jay MD    Procedure: Procedure(s):  COLONOSCOPY DIAGNOSTIC    Medications prior to admission:   Prior to Admission medications    Medication Sig Start Date End Date Taking? Authorizing Provider   atorvastatin (LIPITOR) 40 MG tablet TAKE ONE TABLET BY MOUTH DAILY 10/3/22   Puentes Bliss, APRN - CNP   rivaroxaban (XARELTO) 20 MG TABS tablet TAKE ONE TABLET BY MOUTH DAILY WITH BREAKFAST 22   Osceola Allen, APRN - CNP   venlafaxine (EFFEXOR) 75 MG tablet TAKE 1/2 TABLET BY MOUTH DAILY 22   Puentes Bliss, APRN - CNP   allopurinol (ZYLOPRIM) 100 MG tablet TAKE ONE TABLET BY MOUTH DAILY 22   Puentes Bliss, AMANDO - CNP   amLODIPine (NORVASC) 10 MG tablet Take 1 tablet by mouth daily 22   Puentes Bliss, APRN - [facility]   atenolol (TENORMIN) 50 MG tablet Take 1 tablet by mouth daily 22   Puentes Bliss, APRN - CNP   lisinopril (PRINIVIL;ZESTRIL) 40 MG tablet Take 1 tablet by mouth daily 21  Puentes Bliss, AMANDO - CNP       Current medications:    Current Facility-Administered Medications   Medication Dose Route Frequency Provider Last Rate Last Admin    0.9 % sodium chloride infusion   IntraVENous Continuous Ramona Gutierrez MD           Allergies:     Allergies   Allergen Reactions    Lisinopril Swelling       Problem List:    Patient Active Problem List   Diagnosis Code    Essential hypertension I10    Colonic mass;  benign K63.89    Homonymous hemianopia, 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    Cerebral infarction due to embolism of right posterior cerebral artery (HCC) I63.431    Paroxysmal atrial fibrillation (HCC) I48.0    Tachy-delroy syndrome ([address] I49.5    Typical atrial flutter (HCC) I48.3    Mixed hyperlipidemia E78.2       Past Medical History: Diagnosis Date    [facility]     Cerebral artery occlusion with cerebral infarction ([facility] [address] [facility]     Colonic mass     benign    Hyperlipidemia     Hypertension     Sleep apnea     uses c-pap       Past Surgical History:        Procedure Laterality Date    APPENDECTOMY      ATRIAL ABLATION SURGERY  12/12/2018    Performed by Dr. Wendi Prince.  COLONOSCOPY  11/2009    HEMICOLECTOMY  10/21/2016     OPEN RIGHT HEMICOLECTOMY                  REVISION TOTAL KNEE ARTHROPLASTY Bilateral 06/11/2020    TONSILLECTOMY         Social History:    Social History     Tobacco Use    Smoking status: Never    Smokeless tobacco: Never   Substance Use Topics    Alcohol use: Yes     Alcohol/week: 1.0 standard drink     Types: 1 Cans of beer per week     Comment: occasionally                                Counseling given: Not Answered      Vital Signs (Current):   Vitals:    12/05/22 1150 12/09/22 0637   BP:  138/64   Pulse:  53   Resp:  14   Temp:  98.1 °F (36.7 °C)   TempSrc:  Temporal   SpO2:  99%   Weight: 180 lb (81.6 kg) 180 lb (81.6 kg)   Height: 5' 9\" (1.753 m) 5' 9\" (1.753 m)                                              BP Readings from Last 3 Encounters:   12/09/22 138/64   11/14/22 136/72   10/18/22 (!) 148/74       NPO Status:                                                                                 BMI:   Wt Readings from Last 3 Encounters:   12/09/22 180 lb (81.6 kg)   11/14/22 186 lb 9.6 oz (84.6 kg)   10/18/22 189 lb 12.8 oz (86.1 kg)     Body mass index is 26.58 kg/m².     CBC:   Lab Results   Component Value Date/Time    WBC 4.2 05/12/2022 08:44 AM    RBC 4.84 05/12/2022 08:44 AM    HGB 14.3 05/12/2022 08:44 AM    HCT 43.1 05/12/2022 08:44 AM    MCV 89.1 05/12/2022 08:44 AM    RDW 13.9 05/12/2022 08:44 AM     05/12/2022 08:44 AM       CMP:   Lab Results   Component Value Date/Time     05/12/2022 08:44 AM    K 4.1 05/12/2022 08:44 AM     05/12/2022 08:44 AM    CO2 23 05/12/2022 08:44 AM    BUN 17 05/12/2022 08:44 AM    CREATININE 1.2 05/12/2022 08:44 AM    GFRAA >60 05/12/2022 08:44 AM    GFRAA >60 04/17/2013 09:12 AM    AGRATIO 1.9 05/12/2022 08:44 AM    LABGLOM 60 05/12/2022 08:44 AM    GLUCOSE 109 05/12/2022 08:44 AM    GLUCOSE 121 11/10/2020 06:29 AM    PROT 7.2 05/12/2022 08:44 AM    PROT 7.3 10/14/2011 09:49 AM    CALCIUM 9.3 05/12/2022 08:44 AM    BILITOT 0.5 05/12/2022 08:44 AM    BILITOT 0.6 06/04/2020 12:32 PM    ALKPHOS 112 05/12/2022 08:44 AM    AST 18 05/12/2022 08:44 AM    AST 19 11/05/2020 01:16 PM    ALT 23 05/12/2022 08:44 AM       POC Tests: No results for input(s): POCGLU, POCNA, POCK, POCCL, POCBUN, POCHEMO, POCHCT in the last 72 hours.     Coags:   Lab Results   Component Value Date/Time    PROTIME 14.2 12/12/2018 07:05 AM    INR 1.25 12/12/2018 07:05 AM    APTT 39.0 08/05/2018 10:49 AM       HCG (If Applicable): No results found for: PREGTESTUR, PREGSERUM, HCG, HCGQUANT     ABGs: No results found for: PHART, PO2ART, WWB2MIZ, ACQ0AGV, BEART, H6CXHNOV     Type & Screen (If Applicable):  No results found for: LABABO, LABRH    Drug/Infectious Status (If Applicable):  No results found for: HIV, HEPCAB    COVID-19 Screening (If Applicable):   Lab Results   Component Value Date/Time    COVID19 Not Detected 03/25/2021 11:20 AM           Anesthesia Evaluation  Patient summary reviewed and Nursing notes reviewed no history of anesthetic complications:   Airway: Mallampati: I  TM distance: >3 FB   Neck ROM: full  Mouth opening: > = 3 FB   Dental: normal exam         Pulmonary:   (+) sleep apnea:      (-) asthma and shortness of breath                           Cardiovascular:    (+) hypertension:, dysrhythmias: atrial fibrillation, hyperlipidemia    (-)  angina                Neuro/Psych:   (+) CVA:,             GI/Hepatic/Renal:        (-) GERD and liver disease       Endo/Other:        (-) diabetes mellitus, hypothyroidism               Abdominal: Vascular:     - PVD. Other Findings:           Anesthesia Plan      MAC     ASA 3       Induction: intravenous. Anesthetic plan and risks discussed with patient. Plan discussed with CRNA.                     Laurence Saleh MD   12/9/2022

## 2022-12-13 DIAGNOSIS — I48.0 PAROXYSMAL ATRIAL FIBRILLATION (HCC): ICD-10-CM

## 2022-12-13 NOTE — TELEPHONE ENCOUNTER
Sample requested:   rivaroxaban (XARELTO) 20 MG TABS tablet     Strength:     Dosage: 1 a day    Patient's call back number: [phone]

## 2023-01-03 RX ORDER — ATENOLOL 50 MG/1
TABLET ORAL
Qty: 90 TABLET | Refills: 3 | Status: SHIPPED | OUTPATIENT
Start: 2023-01-03

## 2023-01-03 NOTE — TELEPHONE ENCOUNTER
Future Appointments    Encounter Information    Provider Department Appt Notes   5/15/2023 Courtney Seen, APRN - [address] Internal Medicine 6 months     Past Visits    Date Provider Specialty Visit Type Primary Dx   11/14/2022 Courtney Seen, APRN - CNP Internal Medicine Office Visit Pain of toe of left foot

## 2023-01-23 RX ORDER — VENLAFAXINE 75 MG/1
TABLET ORAL
Qty: 45 TABLET | Refills: 1 | Status: SHIPPED | OUTPATIENT
Start: 2023-01-23

## 2023-03-06 RX ORDER — ALLOPURINOL 100 MG/1
TABLET ORAL
Qty: 90 TABLET | Refills: 2 | Status: SHIPPED | OUTPATIENT
Start: 2023-03-06

## 2023-04-14 PROBLEM — F33.9 MAJOR DEPRESSIVE DISORDER, RECURRENT, UNSPECIFIED (HCC): Status: ACTIVE | Noted: 2023-04-14

## 2023-04-14 PROBLEM — F33.1 MAJOR DEPRESSIVE DISORDER, RECURRENT, MODERATE (HCC): Status: ACTIVE | Noted: 2023-04-14

## 2023-04-14 PROBLEM — F33.0 MAJOR DEPRESSIVE DISORDER, RECURRENT, MILD (HCC): Status: ACTIVE | Noted: 2023-04-14

## 2023-05-09 DIAGNOSIS — I10 ESSENTIAL HYPERTENSION: ICD-10-CM

## 2023-05-09 RX ORDER — AMLODIPINE BESYLATE 10 MG/1
TABLET ORAL
Qty: 90 TABLET | Refills: 3 | Status: SHIPPED | OUTPATIENT
Start: 2023-05-09

## 2023-05-15 ENCOUNTER — OFFICE VISIT (OUTPATIENT)
Dept: INTERNAL MEDICINE CLINIC | Age: 73
End: 2023-05-15

## 2023-05-15 VITALS
BODY MASS INDEX: 26.96 KG/M2 | HEART RATE: 56 BPM | WEIGHT: 182 LBS | OXYGEN SATURATION: 98 % | HEIGHT: 69 IN | DIASTOLIC BLOOD PRESSURE: 70 MMHG | SYSTOLIC BLOOD PRESSURE: 138 MMHG

## 2023-05-15 DIAGNOSIS — I10 ESSENTIAL HYPERTENSION: ICD-10-CM

## 2023-05-15 DIAGNOSIS — Z00.00 MEDICARE ANNUAL WELLNESS VISIT, SUBSEQUENT: Primary | ICD-10-CM

## 2023-05-15 DIAGNOSIS — I48.0 PAROXYSMAL ATRIAL FIBRILLATION (HCC): ICD-10-CM

## 2023-05-15 DIAGNOSIS — R35.1 NOCTURIA: ICD-10-CM

## 2023-05-15 DIAGNOSIS — Z12.5 PROSTATE CANCER SCREENING: ICD-10-CM

## 2023-05-15 DIAGNOSIS — F33.1 MAJOR DEPRESSIVE DISORDER, RECURRENT, MODERATE (HCC): ICD-10-CM

## 2023-05-15 DIAGNOSIS — U07.1 COVID-19: ICD-10-CM

## 2023-05-15 DIAGNOSIS — E78.2 MIXED HYPERLIPIDEMIA: ICD-10-CM

## 2023-05-15 DIAGNOSIS — R73.01 IMPAIRED FASTING GLUCOSE: ICD-10-CM

## 2023-05-15 ASSESSMENT — PATIENT HEALTH QUESTIONNAIRE - PHQ9
6. FEELING BAD ABOUT YOURSELF - OR THAT YOU ARE A FAILURE OR HAVE LET YOURSELF OR YOUR FAMILY DOWN: 0
SUM OF ALL RESPONSES TO PHQ QUESTIONS 1-9: 0
7. TROUBLE CONCENTRATING ON THINGS, SUCH AS READING THE NEWSPAPER OR WATCHING TELEVISION: 0
8. MOVING OR SPEAKING SO SLOWLY THAT OTHER PEOPLE COULD HAVE NOTICED. OR THE OPPOSITE, BEING SO FIGETY OR RESTLESS THAT YOU HAVE BEEN MOVING AROUND A LOT MORE THAN USUAL: 0
4. FEELING TIRED OR HAVING LITTLE ENERGY: 0
2. FEELING DOWN, DEPRESSED OR HOPELESS: 0
10. IF YOU CHECKED OFF ANY PROBLEMS, HOW DIFFICULT HAVE THESE PROBLEMS MADE IT FOR YOU TO DO YOUR WORK, TAKE CARE OF THINGS AT HOME, OR GET ALONG WITH OTHER PEOPLE: 0
9. THOUGHTS THAT YOU WOULD BE BETTER OFF DEAD, OR OF HURTING YOURSELF: 0
1. LITTLE INTEREST OR PLEASURE IN DOING THINGS: 0
3. TROUBLE FALLING OR STAYING ASLEEP: 0
SUM OF ALL RESPONSES TO PHQ9 QUESTIONS 1 & 2: 0
SUM OF ALL RESPONSES TO PHQ QUESTIONS 1-9: 0
5. POOR APPETITE OR OVEREATING: 0
SUM OF ALL RESPONSES TO PHQ QUESTIONS 1-9: 0
SUM OF ALL RESPONSES TO PHQ QUESTIONS 1-9: 0

## 2023-05-15 ASSESSMENT — ENCOUNTER SYMPTOMS
RESPIRATORY NEGATIVE: 1
GASTROINTESTINAL NEGATIVE: 1

## 2023-05-15 NOTE — PATIENT INSTRUCTIONS
Learning About Being Active as an Older Adult  Why is being active important as you get older? Being active is one of the best things you can do for your health. And it's never too late to start. Being active--or getting active, if you aren't already--has definite benefits. It can:  Give you more energy,  Keep your mind sharp. Improve balance to reduce your risk of falls. Help you manage chronic illness with fewer medicines. No matter how old you are, how fit you are, or what health problems you have, there is a form of activity that will work for you. And the more physical activity you can do, the better your overall health will be. What kinds of activity can help you stay healthy? Being more active will make your daily activities easier. Physical activity includes planned exercise and things you do in daily life. There are four types of activity:  Aerobic. Doing aerobic activity makes your heart and lungs strong. Includes walking, dancing, and gardening. Aim for at least 2½ hours spread throughout the week. It improves your energy and can help you sleep better. Muscle-strengthening. This type of activity can help maintain muscle and strengthen bones. Includes climbing stairs, using resistance bands, and lifting or carrying heavy loads. Aim for at least twice a week. It can help protect the knees and other joints. Stretching. Stretching gives you better range of motion in joints and muscles. Includes upper arm stretches, calf stretches, and gentle yoga. Aim for at least twice a week, preferably after your muscles are warmed up from other activities. It can help you function better in daily life. Balancing. This helps you stay coordinated and have good posture. Includes heel-to-toe walking, ingrid chi, and certain types of yoga. Aim for at least 3 days a week. It can reduce your risk of falling.   Even if you have a hard time meeting the recommendations, it's better to be more active

## 2023-05-15 NOTE — PROGRESS NOTES
Medicare Annual Wellness Visit    Hollie Lane is here for Medicare AWV    Assessment & Plan     Medicare annual wellness visit, subsequent    COVID-19  - Previously had COVID, saw my partner  - Did not get antivral from pharmacy  - Cough took some time to resolve  - Resolved    Essential hypertension  - Normotensive  - he has met JNC standards.  - Continue current regimen. -     Comprehensive Metabolic Panel; Future  -     LIPID PANEL; Future  -     Hemoglobin A1C; Future    Mixed hyperlipidemia  - Chronic, stable  - Continue current regimen  -     Comprehensive Metabolic Panel; Future  -     LIPID PANEL; Future  -     Hemoglobin A1C; Future    Paroxysmal atrial fibrillation (HCC)  - Chronic, stable  - Continue current regimen  -     Comprehensive Metabolic Panel; Future  -     LIPID PANEL; Future  -     Hemoglobin A1C; Future    Impaired fasting glucose  -     Comprehensive Metabolic Panel; Future  -     LIPID PANEL; Future  -     Hemoglobin A1C; Future    Major depressive disorder, recurrent, moderate  - Chronic, stable  - Continue current regimen    Nocturia  - New problem  - Mild, declines need for treatment  -     PSA, Prostatic Specific Antigen; Future    Prostate cancer screening  -     PSA, Prostatic Specific Antigen; Future      Recommendations for Preventive Services Due: see orders and patient instructions/AVS.  Recommended screening schedule for the next 5-10 years is provided to the patient in written form: see Patient Instructions/AVS.     No follow-ups on file. Subjective   Review of Systems   Constitutional: Negative. Respiratory: Negative. Cardiovascular: Negative. Gastrointestinal: Negative. Genitourinary:  Positive for frequency. Musculoskeletal: Negative. Neurological: Negative. Psychiatric/Behavioral: Negative. Patient's complete Health Risk Assessment and screening values have been reviewed and are found in Flowsheets.  The following problems were

## 2023-06-06 DIAGNOSIS — Z12.5 PROSTATE CANCER SCREENING: ICD-10-CM

## 2023-06-06 DIAGNOSIS — R35.1 NOCTURIA: ICD-10-CM

## 2023-06-06 DIAGNOSIS — I48.0 PAROXYSMAL ATRIAL FIBRILLATION (HCC): ICD-10-CM

## 2023-06-06 DIAGNOSIS — R73.01 IMPAIRED FASTING GLUCOSE: ICD-10-CM

## 2023-06-06 DIAGNOSIS — I10 ESSENTIAL HYPERTENSION: ICD-10-CM

## 2023-06-06 DIAGNOSIS — E78.2 MIXED HYPERLIPIDEMIA: ICD-10-CM

## 2023-06-06 LAB
ALBUMIN SERPL-MCNC: 4.5 G/DL (ref 3.4–5)
ALBUMIN/GLOB SERPL: 1.7 {RATIO} (ref 1.1–2.2)
ALP SERPL-CCNC: 96 U/L (ref 40–129)
ALT SERPL-CCNC: 20 U/L (ref 10–40)
ANION GAP SERPL CALCULATED.3IONS-SCNC: 17 MMOL/L (ref 3–16)
AST SERPL-CCNC: 20 U/L (ref 15–37)
BILIRUB SERPL-MCNC: 0.9 MG/DL (ref 0–1)
BUN SERPL-MCNC: 17 MG/DL (ref 7–20)
CALCIUM SERPL-MCNC: 9.2 MG/DL (ref 8.3–10.6)
CHLORIDE SERPL-SCNC: 102 MMOL/L (ref 99–110)
CHOLEST SERPL-MCNC: 130 MG/DL (ref 0–199)
CO2 SERPL-SCNC: 21 MMOL/L (ref 21–32)
CREAT SERPL-MCNC: 1.1 MG/DL (ref 0.8–1.3)
GFR SERPLBLD CREATININE-BSD FMLA CKD-EPI: >60 ML/MIN/{1.73_M2}
GLUCOSE SERPL-MCNC: 105 MG/DL (ref 70–99)
HDLC SERPL-MCNC: 41 MG/DL (ref 40–60)
LDLC SERPL CALC-MCNC: 57 MG/DL
POTASSIUM SERPL-SCNC: 4.3 MMOL/L (ref 3.5–5.1)
PROT SERPL-MCNC: 7.2 G/DL (ref 6.4–8.2)
PSA SERPL DL<=0.01 NG/ML-MCNC: 1.53 NG/ML (ref 0–4)
SODIUM SERPL-SCNC: 140 MMOL/L (ref 136–145)
TRIGL SERPL-MCNC: 160 MG/DL (ref 0–150)
VLDLC SERPL CALC-MCNC: 32 MG/DL

## 2023-06-07 LAB
EST. AVERAGE GLUCOSE BLD GHB EST-MCNC: 114 MG/DL
HBA1C MFR BLD: 5.6 %

## 2023-07-13 NOTE — TELEPHONE ENCOUNTER
Pt states she had CT myelogram on Tuesday and c/o headache and neck pain since.
Pt walked in-given one bottle of Xarelto 20 mg, Lot # [id number], Exp. 3/'21.
none

## 2023-07-18 ENCOUNTER — OFFICE VISIT (OUTPATIENT)
Dept: INTERNAL MEDICINE CLINIC | Age: 73
End: 2023-07-18

## 2023-07-18 VITALS
WEIGHT: 189.4 LBS | HEART RATE: 56 BPM | SYSTOLIC BLOOD PRESSURE: 138 MMHG | DIASTOLIC BLOOD PRESSURE: 60 MMHG | BODY MASS INDEX: 27.97 KG/M2 | OXYGEN SATURATION: 98 %

## 2023-07-18 DIAGNOSIS — L30.9 DERMATITIS: ICD-10-CM

## 2023-07-18 PROBLEM — B35.4 TINEA CORPORIS: Status: ACTIVE | Noted: 2023-07-18

## 2023-07-18 PROBLEM — B35.4 TINEA CORPORIS: Status: RESOLVED | Noted: 2023-07-18 | Resolved: 2023-07-18

## 2023-07-18 RX ORDER — CLOTRIMAZOLE AND BETAMETHASONE DIPROPIONATE 10; .64 MG/G; MG/G
CREAM TOPICAL
Qty: 45 G | Refills: 0 | Status: SHIPPED | OUTPATIENT
Start: 2023-07-18

## 2023-07-18 NOTE — PROGRESS NOTES
Angel Frye (:  1950) is a 68 y.o. male,Established patient, here for evaluation of the following chief complaint(s):  Urticaria (Left thigh and and left bottom )    ASSESSMENT/PLAN:  1. Dermatitis  Assessment & Plan:  Appears to have added fungal nature due to flakiness of area. Denies fever, localized pain. Apply cream to area twice daily for up to two weeks. Wash area daily. Wash sheets, clothes. If no improvement with 2 weeks of treatment, instructed to follow-up in office. Orders:  -     clotrimazole-betamethasone (LOTRISONE) 1-0.05 % cream; Apply topically 2 times daily for two weeks. , Disp-45 g, R-0, Normal      Return if symptoms worsen or fail to improve in two weeks. SUBJECTIVE/OBJECTIVE:  HPI    52-year-old male in for acute visit. Last Monday 7/10/23 started with ~4 inch area on his upper thigh/gluteal area, of what he thought was hives hives. Slowly growing in size, pruritic. Wife and daughter with similar spots after recent trip together. Has taken benadryl. Hasn't applied anything to the area. Current Outpatient Medications   Medication Sig Dispense Refill    clotrimazole-betamethasone (LOTRISONE) 1-0.05 % cream Apply topically 2 times daily for two weeks. 45 g 0    rivaroxaban (XARELTO) 20 MG TABS tablet TAKE ONE TABLET BY MOUTH DAILY WITH BREAKFAST 90 tablet 1    amLODIPine (NORVASC) 10 MG tablet TAKE ONE TABLET BY MOUTH DAILY 90 tablet 3    allopurinol (ZYLOPRIM) 100 MG tablet TAKE ONE TABLET BY MOUTH DAILY 90 tablet 2    venlafaxine (EFFEXOR) 75 MG tablet TAKE 1/2 TABLET BY MOUTH DAILY 45 tablet 1    atenolol (TENORMIN) 50 MG tablet TAKE ONE TABLET BY MOUTH DAILY 90 tablet 3    atorvastatin (LIPITOR) 40 MG tablet TAKE ONE TABLET BY MOUTH DAILY 90 tablet 3     No current facility-administered medications for this visit. Review of Systems   Constitutional:  Negative for activity change, chills, fatigue and fever.    Skin:  Positive for rash (Left

## 2023-07-18 NOTE — ASSESSMENT & PLAN NOTE
Appears to have added fungal nature due to flakiness of area. Denies fever, localized pain. Apply cream to area twice daily for up to two weeks. Wash area daily. Wash sheets, clothes. If no improvement with 2 weeks of treatment, instructed to follow-up in office.

## 2023-07-19 ENCOUNTER — TELEPHONE (OUTPATIENT)
Dept: INTERNAL MEDICINE CLINIC | Age: 73
End: 2023-07-19

## 2023-07-19 NOTE — TELEPHONE ENCOUNTER
Pt calling, saw Car Curry yesterday for a rash. Pt did use clotrimazole-betamethasone (LOTRISONE) 1-0.05 % cream last night and this morning but states it has not helped. Since it has spread to his face and head. Pt would like to know if he needs to be seen again today. Please advise and call pt.

## 2023-07-20 ENCOUNTER — OFFICE VISIT (OUTPATIENT)
Dept: INTERNAL MEDICINE CLINIC | Age: 73
End: 2023-07-20

## 2023-07-20 VITALS
SYSTOLIC BLOOD PRESSURE: 132 MMHG | WEIGHT: 191 LBS | DIASTOLIC BLOOD PRESSURE: 68 MMHG | BODY MASS INDEX: 28.29 KG/M2 | HEIGHT: 69 IN | HEART RATE: 64 BPM

## 2023-07-20 DIAGNOSIS — T78.40XA ALLERGIC REACTION, INITIAL ENCOUNTER: Primary | ICD-10-CM

## 2023-07-20 DIAGNOSIS — L20.84 INTRINSIC ATOPIC DERMATITIS: ICD-10-CM

## 2023-07-20 RX ORDER — HYDROXYZINE HYDROCHLORIDE 25 MG/1
TABLET, FILM COATED ORAL
Qty: 30 TABLET | Refills: 0 | Status: SHIPPED | OUTPATIENT
Start: 2023-07-20

## 2023-07-20 RX ORDER — PREDNISONE 10 MG/1
TABLET ORAL
Qty: 21 TABLET | Refills: 0 | Status: SHIPPED | OUTPATIENT
Start: 2023-07-20

## 2023-07-20 ASSESSMENT — ENCOUNTER SYMPTOMS
SHORTNESS OF BREATH: 0
TROUBLE SWALLOWING: 0
WHEEZING: 0
VOICE CHANGE: 0

## 2023-07-20 NOTE — PROGRESS NOTES
2023    Raúl Cruz (:  1950) is a 68 y.o. male, here for evaluation of the following chief complaint(s):  Rash        ASSESSMENT/PLAN:    1. Allergic reaction, initial encounter  Not clear exactly what caused this reaction. It is possible if he is extremely sensitive to poison ivy and there was some on the tree that was burned, the fumes could have started this process. Etiology of the rash will likely not be known. We will treat with oral steroids and antihistamines. - predniSONE (DELTASONE) 10 MG tablet; 6 tabs day 1; 5 tabs day 2, 4 tabs, day 3 and taper 1 pill per day til gone. Dispense: 21 tablet; Refill: 0  - hydrOXYzine HCl (ATARAX) 25 MG tablet; Take 1/2 tablet during the day every 8 hours as needed for itching and 1 at bedtime until rash is better. Dispense: 30 tablet; Refill: 0    He may continue to use the Lotrisone on the scaling rash but should not use this on his face. And it is not necessary to use on the neck either. 2. Intrinsic atopic dermatitis  He has a history of repeated hives and rashes. His current skin condition examination would be consistent with an atopic dermatitis/eczema        FOLLOW UP:  Call or return to clinic prn if these symptoms worsen or fail to improve as anticipated. HPI  Here for further evaluation of a rash. He saw nurse practitioner 2 days ago. She felt that he could have tinea and prescribed Lotrisone. It is helping the rash around his upper thigh and buttock crease to a small extent. He still very itchy, and now his face is itchy and swollen and he is getting bumps under the skin on both his cheeks forehead and scalp. Sometimes the bumps ooze a little bit and then dry and scab over. His face actually feels swollen. Over his adult life has had episodes of hives and reactions. He tends to have very sensitive skin. He thought that it was a hive started out his posterior left thigh and buttock.     His wife had a skin spot

## 2023-07-20 NOTE — TELEPHONE ENCOUNTER
Patient called back this morning stating he called yesterday to see if he needs to be seen again for a rash. He saw Isaiahmp Delmi but is no better. Looks like message was routed to [facility] but nothing addressed. He wants to know if there is something that Jaime Ayers can do for him. Isaiahmp Awad is out of the office today. Please call patient.

## 2023-07-21 RX ORDER — VENLAFAXINE 75 MG/1
TABLET ORAL
Qty: 45 TABLET | Refills: 1 | Status: SHIPPED | OUTPATIENT
Start: 2023-07-21

## 2023-10-04 RX ORDER — ATORVASTATIN CALCIUM 40 MG/1
TABLET, FILM COATED ORAL
Qty: 90 TABLET | Refills: 3 | Status: SHIPPED | OUTPATIENT
Start: 2023-10-04

## 2023-11-13 ENCOUNTER — OFFICE VISIT (OUTPATIENT)
Dept: INTERNAL MEDICINE CLINIC | Age: 73
End: 2023-11-13

## 2023-11-13 VITALS
HEIGHT: 69 IN | BODY MASS INDEX: 28.14 KG/M2 | HEART RATE: 55 BPM | OXYGEN SATURATION: 99 % | WEIGHT: 190 LBS | SYSTOLIC BLOOD PRESSURE: 120 MMHG | DIASTOLIC BLOOD PRESSURE: 80 MMHG

## 2023-11-13 DIAGNOSIS — I10 ESSENTIAL HYPERTENSION: Primary | ICD-10-CM

## 2023-11-13 DIAGNOSIS — R73.01 IMPAIRED FASTING GLUCOSE: ICD-10-CM

## 2023-11-13 DIAGNOSIS — E78.2 MIXED HYPERLIPIDEMIA: ICD-10-CM

## 2023-11-13 DIAGNOSIS — Z23 NEED FOR INFLUENZA VACCINATION: ICD-10-CM

## 2023-11-13 DIAGNOSIS — F33.1 MAJOR DEPRESSIVE DISORDER, RECURRENT, MODERATE (HCC): ICD-10-CM

## 2023-11-13 DIAGNOSIS — I48.0 PAROXYSMAL ATRIAL FIBRILLATION (HCC): ICD-10-CM

## 2023-11-14 NOTE — PROGRESS NOTES
SUBJECTIVE:    Patient ID: Isaias Duncan is a 71 y.o. male. CC: Hypertension, atrial fibrillation, hyperlipidemia, toe burning    HPI: The patient presents to the office today for routine follow-up of chronic medical conditions. He also has acute concerns. Knees doing well after surgery. He has burning in 1 toe intermittently, occurs rarely. Feet soreness after driving for a time. Feels this is arthritic but worries about neuropathy. Patient has a history of hypertension and hyperlipidemia. He denies any chest pain or shortness of breath. He is compliant with his medication regimen. Taken off lisinopril due to concerns about swelling in jaw area. Blood pressure borderline in medial offices. Not checking at home. History of atrial fibrillation. He is a Xarelto. Past Medical History:   Diagnosis Date    Atrial fibrillation ([facility] [address]     Cerebral artery occlusion with cerebral infarction (HCC)     Hyperlipidemia     Hypertension     Sleep apnea     uses c-pap        Current Outpatient Medications   Medication Sig Dispense Refill    atorvastatin (LIPITOR) 40 MG tablet TAKE ONE TABLET BY MOUTH DAILY 90 tablet 3    rivaroxaban (XARELTO) 20 MG TABS tablet TAKE ONE TABLET BY MOUTH DAILY WITH BREAKFAST 90 tablet 0    venlafaxine (EFFEXOR) 75 MG tablet TAKE 1/2 TABLET BY MOUTH DAILY 45 tablet 1    allopurinol (ZYLOPRIM) 100 MG tablet TAKE ONE TABLET BY MOUTH DAILY 90 tablet 2    amLODIPine (NORVASC) 10 MG tablet Take 1 tablet by mouth daily 90 tablet 3    atenolol (TENORMIN) 50 MG tablet Take 1 tablet by mouth daily 90 tablet 3     No current facility-administered medications for this visit. Review of Systems   Constitutional: Negative. Respiratory: Negative. Cardiovascular: Negative. Gastrointestinal: Negative. Genitourinary: Negative. Musculoskeletal:  Positive for arthralgias. Rare, intermittent toe pain   Skin: Negative. Neurological: Negative.   Negative Subjective:     Patient ID:  Kg Lawrence is a 77 y.o. male. HPI:    Hearing Loss  Patient presents today with complaints of hearing loss. Concern regarding hearing has been present for 3 months. He has not failed a prior hearing test.The patient reports difficulty hearing, history of ear infections, turning up the T.V., saying \"huh\" or \"what\". Tinnitus  Patient presents with tinnitus. Onset of symptoms was gradual several years ago ago with gradually worsening course since that time. Patient describes the tinnitus as constant located in the bilateral ear. The quality is described as high pitch that sounds like buzz. The pattern is nonpulsatile with an intensity that is medium. Patient describes his level of annoyance as moderately annoying, always aware. Associated symptoms include hearing loss and recurrent otitis Family history is positive for tinnitus in the patient's father and paternal grandfatherPatient has had no prior evaluation, treatment or surgery for tinnitus  Previous treatments include none. Patient does not have hearing aids at this time. 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 States this was the only ear infection he has ever had. History reviewed. No pertinent past medical history. Past Surgical History:   Procedure Laterality Date    TONSILLECTOMY       History reviewed. No pertinent family history.   Social History     Socioeconomic History    Marital status: Unknown     Spouse name: None    Number of children: None    Years of education: None    Highest education level: None   Tobacco Use    Smoking status: Never     Passive for weakness and numbness. Psychiatric/Behavioral: Negative. OBJECTIVE:  Physical Exam  Vitals reviewed. Constitutional:       General: He is not in acute distress. Appearance: He is well-developed. He is not diaphoretic. HENT:      Head: Normocephalic and atraumatic. Eyes:      General: No scleral icterus. Conjunctiva/sclera: Conjunctivae normal.   Neck:      Vascular: No JVD. Cardiovascular:      Rate and Rhythm: Normal rate and regular rhythm. Pulses:           Popliteal pulses are 2+ on the right side and 2+ on the left side. Dorsalis pedis pulses are 2+ on the right side and 2+ on the left side. Pulmonary:      Effort: Pulmonary effort is normal. No respiratory distress. Breath sounds: Normal breath sounds. No wheezing or rales. Abdominal:      General: There is no distension. Palpations: Abdomen is soft. Tenderness: There is no abdominal tenderness. There is no guarding or rebound. Musculoskeletal:         General: Normal range of motion. Cervical back: Neck supple. Skin:     General: Skin is warm and dry. Neurological:      Mental Status: He is alert and oriented to person, place, and time. Sensory: Sensation is intact. No sensory deficit. Motor: Motor function is intact. No weakness, tremor, atrophy or abnormal muscle tone. Coordination: Coordination is intact. Comments: Normal sensation of the feet. Normal monofilament test.  Normal and equal sensation bilaterally. Psychiatric:         Behavior: Behavior normal.         Thought Content:  Thought content normal.      /72   Pulse 63   Temp 98.1 °F (36.7 °C)   Ht 5' 9\" (1.753 m)   Wt 186 lb 9.6 oz (84.6 kg)   SpO2 99%   BMI 27.56 kg/m²      PHQ Scores 5/12/2022 5/11/2021 6/5/2020 4/25/2019 8/21/2018 4/20/2017   PHQ2 Score 0 0 0 0 0 0   PHQ9 Score 0 0 0 0 0 0     Interpretation of Total Score Depression Severity: 1-4 = Minimal depression, 5-9 = Mild depression, 10-14 = Moderate depression, 15-19 = Moderately severe depression, 20-27 =Severe depression        ASSESSMENT/PLAN:  Evans Tirado was seen today for 6 month follow-up. Diagnoses and all orders for this visit:    Pain of toe of left foot  -Rare, intermittent pain of 1 toe. Foot symptoms seem to be worse after riding in a car. He feels this is arthritic but was concerned about neuropathy  -Normal neurological testing of the bilateral lower extremities.  -Discussed EMG but will hold off for now. He will contact the office if symptoms are worsening or failing to improve. Essential hypertension  - Normotensive here but borderline  - Monitor at home given concerns for white coat  - he has met JNC standards.  - Continue current regimen.     Mixed hyperlipidemia  - Chronic, stable  - Continue current regimen    Paroxysmal atrial fibrillation (HCC)  - Chronic, stable  - Continue current regimen    Impaired fasting glucose  - A1c 5.7  - Chronic, stable  - Continue monitoring    Screening for colon cancer  -     AFL - Heena Barkley MD, Gastroenterology (ERCP & EUS), [facility]    Need for influenza vaccination  -     Influenza, FLUAD, (age 72 y+), IM, Preservative Free, 0.5 mL      AMANDO Alex - CNP

## 2023-11-24 ASSESSMENT — ENCOUNTER SYMPTOMS
RESPIRATORY NEGATIVE: 1
GASTROINTESTINAL NEGATIVE: 1

## 2023-11-24 NOTE — PROGRESS NOTES
SUBJECTIVE:    Patient ID: Sanna Padilla is a 68 y.o. male. CC: Hypertension, atrial fibrillation, hyperlipidemia    HPI: The patient presents to the office today for routine follow-up of chronic medical conditions. Patient has a history of hypertension and hyperlipidemia. He denies any chest pain or shortness of breath. He is compliant with his medication regimen. He was taken off lisinopril due to concerns about swelling in jaw area. He seems to be tolerating amlodipine and atenolol. History of atrial fibrillation. He denies any chest pain or shortness of breath. He is a Xarelto. Prediabetes. This was resolved on his most recent A1c of 5.6. Past Medical History:   Diagnosis Date    Atrial fibrillation ([address])     Cerebral artery occlusion with cerebral infarction [facility])     Colonic mass     benign    Hyperlipidemia     Hypertension     Sleep apnea     uses c-pap        Current Outpatient Medications   Medication Sig Dispense Refill    atorvastatin (LIPITOR) 40 MG tablet TAKE ONE TABLET BY MOUTH DAILY 90 tablet 3    venlafaxine (EFFEXOR) 75 MG tablet TAKE 1/2 TABLET BY MOUTH DAILY 45 tablet 1    rivaroxaban (XARELTO) 20 MG TABS tablet TAKE ONE TABLET BY MOUTH DAILY WITH BREAKFAST 90 tablet 1    amLODIPine (NORVASC) 10 MG tablet TAKE ONE TABLET BY MOUTH DAILY 90 tablet 3    allopurinol (ZYLOPRIM) 100 MG tablet TAKE ONE TABLET BY MOUTH DAILY 90 tablet 2    atenolol (TENORMIN) 50 MG tablet TAKE ONE TABLET BY MOUTH DAILY 90 tablet 3     No current facility-administered medications for this visit. Review of Systems   Constitutional: Negative. Respiratory: Negative. Cardiovascular: Negative. Gastrointestinal: Negative. Genitourinary: Negative. Musculoskeletal:  Positive for arthralgias. Skin: Negative. Neurological: Negative. Psychiatric/Behavioral: Negative. OBJECTIVE:  Physical Exam  Vitals reviewed.    Constitutional:       General: He is not in

## 2023-11-30 RX ORDER — ALLOPURINOL 100 MG/1
TABLET ORAL
Qty: 90 TABLET | Refills: 2 | Status: SHIPPED | OUTPATIENT
Start: 2023-11-30

## 2023-12-13 ENCOUNTER — TELEPHONE (OUTPATIENT)
Dept: CARDIOLOGY CLINIC | Age: 73
End: 2023-12-13

## 2023-12-13 DIAGNOSIS — I48.0 PAROXYSMAL ATRIAL FIBRILLATION (HCC): ICD-10-CM

## 2023-12-13 NOTE — TELEPHONE ENCOUNTER
Last OV: 10/18/22 RMM  Next OV: 01/17/24 RMM  Last Labs: 06/06/23  Last EKG: 10/18/22  Last Fill:   rivaroxaban (XARELTO) 20 MG TABS tablet 90 tablet 1 6/14/2023     Sig: TAKE ONE TABLET BY MOUTH DAILY WITH BREAKFAST    Sent to pharmacy as: Rivaroxaban 20 MG Oral Tablet (Xarelto)    Cosign for Ordering: Accepted by AMANDO Lemus CNP on 6/14/2023  3:52 PM    E-Prescribing Status: Receipt confirmed by pharmacy (6/14/2023  3:11 PM EDT)

## 2023-12-13 NOTE — TELEPHONE ENCOUNTER
Medication Refill    Medication needing refilled:  rivaroxaban (XARELTO)   Dosage of the medication:  20 MG TABS   How are you taking this medication (QD, BID, TID, QID, PRN):    30 or 90 day supply called in:  30  When will you run out of your medication:    Which Pharmacy are we sending the medication to?:    [facility] [id number] [place] [id number] [facility], [address]  Phone: [phone]  Fax: [phone]      Pt requesting 30 days instead of 90

## 2023-12-28 RX ORDER — ATENOLOL 50 MG/1
TABLET ORAL
Qty: 90 TABLET | Refills: 1 | Status: SHIPPED | OUTPATIENT
Start: 2023-12-28

## 2024-01-16 PROBLEM — G47.33 OSA (OBSTRUCTIVE SLEEP APNEA): Status: ACTIVE | Noted: 2024-01-16

## 2024-01-18 RX ORDER — VENLAFAXINE 75 MG/1
TABLET ORAL
Qty: 45 TABLET | Refills: 1 | Status: SHIPPED | OUTPATIENT
Start: 2024-01-18

## 2024-02-06 NOTE — PROGRESS NOTES
aortic regurgitation.   -Mild mitral regurgitation.   -Mild tricuspid regurgitation with a estimated RVSP of 43 mmHg.   -Moderate pulmonic regurgitation present.   -The right ventricle is borderline enlarged.   -A bubble study was performed and fails to show evidence of shunting.   -IVC size is normal (<2.1 cm) but collapses < 50% with respiration.   -Normal diastolic function.     BRI: 12/12/18  Summary   Normal left ventricle size, wall thickness, and systolic function with an   estimated ejection fraction of 55-60%. No regional wall motion abnormalities   are seen.    Mild mitral valve leaflet thickening. There is mild mitral regurgitation.    Mild to moderate aortic regurgitation.     BRI:  8/7/18   Summary   -Normal left ventricle size, wall thickness, and systolic function with an   estimated ejection fraction of 55-60%.   -The mitral valve has non specific thickening. There is mild-moderate mitral   regurgitation with multiple jets.   -Mildly dilated left atrium.   -There is no evidence of mass or thrombus in the left atrium or appendage.   -There is mild aortic sclerosis.   -Mild aortic regurgitation.   -There is mild plaque in the thoracic aorta.   -A bubble study was performed and fails to show evidence of shunting.   -No cardiac source of emboli detected.     Nuclear stress:  10/2/18  Summary   Normal myocardial perfusion.   Normal LV size and systolic function.   Rapid atrial fib with exercise and 2 mm ST depression. Asymptomatic.         Lab Results   Component Value Date    ALT 20 06/06/2023    AST 20 06/06/2023     06/06/2023    K 4.3 06/06/2023    HGB 14.3 05/12/2022     06/06/2023    CREATININE 1.1 06/06/2023    BUN 17 06/06/2023    TSH 2.54 12/09/2014     05/12/2022    INR 1.25 (H) 12/12/2018        \"I independently reviewed and interpreted the ECG and Cardiac tracings\"  *** \"I independently reviewed and interpreted the Holter results\"  *** \"I independently reviewed and 
SURGERY  12/12/2018    Performed by Dr. Mccartney.     COLONOSCOPY  11/2009    COLONOSCOPY N/A 12/9/2022    COLONOSCOPY POLYPECTOMY SNARE/COLD BIOPSY performed by Bran Galicia MD at [facility] ENDOSCOPY    HEMICOLECTOMY  10/21/2016     OPEN RIGHT HEMICOLECTOMY                  REVISION TOTAL KNEE ARTHROPLASTY Bilateral 06/11/2020    TONSILLECTOMY       Allergies   Allergen Reactions    Lisinopril Swelling       Social History:  Reviewed.  reports that he has never smoked. He has never used smokeless tobacco. He reports current alcohol use of about 1.0 standard drink of alcohol per week. He reports that he does not use drugs.     Family History:  Reviewed. Reviewed. No family history of SCD.      - The patient is counseled to follow a low salt diet to assure blood pressure remains controlled for cardiovascular risk factor modification.   - The patient is counseled to avoid excess caffeine, and energy drinks as this may exacerbated ectopy and arrhythmia.   - The patient is counseled to get regular exercise 3-5 times per week to control cardiovascular risk factors.   - The patient is counseled to lose weigt to control cardiovascular risk factors.   - The patient is counseled to avoid tobacco use.       All questions and concerns were addressed to the patient/family. Alternatives to my treatment were discussed. I have discussed the above stated plan and the patient verbalized understanding and agreed with the plan.    Scribe attestation: This note was scribed in the presence of Bib Escalera MD by Dinora Ceballos, RN    Physician Attestation: I, Dr. Bib escalera, confirm that the scribe's documentation has been prepared under my direction and personally reviewed by me in its entirety.  I also confirm that the note above accurately reflects all work, treatment, procedures, and medical decision making performed by me.      NOTE: This report was transcribed using voice recognition software. Every effort was made to ensure

## 2024-02-07 ENCOUNTER — OFFICE VISIT (OUTPATIENT)
Dept: CARDIOLOGY CLINIC | Age: 74
End: 2024-02-07
Payer: MEDICARE

## 2024-02-07 VITALS
SYSTOLIC BLOOD PRESSURE: 150 MMHG | DIASTOLIC BLOOD PRESSURE: 70 MMHG | WEIGHT: 190.4 LBS | OXYGEN SATURATION: 100 % | BODY MASS INDEX: 28.2 KG/M2 | HEIGHT: 69 IN | HEART RATE: 60 BPM

## 2024-02-07 DIAGNOSIS — I48.0 PAROXYSMAL ATRIAL FIBRILLATION (HCC): Primary | ICD-10-CM

## 2024-02-07 DIAGNOSIS — I63.431 CEREBRAL INFARCTION DUE TO EMBOLISM OF RIGHT POSTERIOR CEREBRAL ARTERY (HCC): ICD-10-CM

## 2024-02-07 DIAGNOSIS — R01.1 MURMUR: ICD-10-CM

## 2024-02-07 DIAGNOSIS — I10 ESSENTIAL HYPERTENSION: ICD-10-CM

## 2024-02-07 PROCEDURE — 93000 ELECTROCARDIOGRAM COMPLETE: CPT | Performed by: INTERNAL MEDICINE

## 2024-02-07 PROCEDURE — 99214 OFFICE O/P EST MOD 30 MIN: CPT | Performed by: INTERNAL MEDICINE

## 2024-02-07 PROCEDURE — 3078F DIAST BP <80 MM HG: CPT | Performed by: INTERNAL MEDICINE

## 2024-02-07 PROCEDURE — 3077F SYST BP >= 140 MM HG: CPT | Performed by: INTERNAL MEDICINE

## 2024-02-07 PROCEDURE — 1123F ACP DISCUSS/DSCN MKR DOCD: CPT | Performed by: INTERNAL MEDICINE

## 2024-02-09 ENCOUNTER — HOSPITAL ENCOUNTER (OUTPATIENT)
Dept: CARDIOLOGY | Age: 74
Discharge: HOME | End: 2024-02-09
Payer: MEDICARE

## 2024-02-09 DIAGNOSIS — I48.0 PAROXYSMAL ATRIAL FIBRILLATION (HCC): ICD-10-CM

## 2024-02-09 DIAGNOSIS — R01.1 MURMUR: ICD-10-CM

## 2024-02-09 PROCEDURE — 93306 TTE W/DOPPLER COMPLETE: CPT

## 2024-05-03 DIAGNOSIS — I10 ESSENTIAL HYPERTENSION: ICD-10-CM

## 2024-05-03 RX ORDER — AMLODIPINE BESYLATE 10 MG/1
TABLET ORAL
Qty: 90 TABLET | Refills: 1 | Status: SHIPPED | OUTPATIENT
Start: 2024-05-03

## 2024-05-17 ENCOUNTER — OFFICE VISIT (OUTPATIENT)
Dept: INTERNAL MEDICINE CLINIC | Age: 74
End: 2024-05-17

## 2024-05-17 VITALS
OXYGEN SATURATION: 96 % | SYSTOLIC BLOOD PRESSURE: 130 MMHG | HEIGHT: 69 IN | BODY MASS INDEX: 27.7 KG/M2 | DIASTOLIC BLOOD PRESSURE: 70 MMHG | HEART RATE: 49 BPM | WEIGHT: 187 LBS

## 2024-05-17 DIAGNOSIS — E78.2 MIXED HYPERLIPIDEMIA: ICD-10-CM

## 2024-05-17 DIAGNOSIS — R73.01 IMPAIRED FASTING GLUCOSE: ICD-10-CM

## 2024-05-17 DIAGNOSIS — I10 ESSENTIAL HYPERTENSION: ICD-10-CM

## 2024-05-17 DIAGNOSIS — F33.1 MAJOR DEPRESSIVE DISORDER, RECURRENT, MODERATE (HCC): ICD-10-CM

## 2024-05-17 DIAGNOSIS — Z00.00 MEDICARE ANNUAL WELLNESS VISIT, SUBSEQUENT: Primary | ICD-10-CM

## 2024-05-17 DIAGNOSIS — Z12.5 PROSTATE CANCER SCREENING: ICD-10-CM

## 2024-05-17 DIAGNOSIS — I48.0 PAROXYSMAL ATRIAL FIBRILLATION (HCC): ICD-10-CM

## 2024-05-17 DIAGNOSIS — Z86.73 HISTORY OF STROKE: ICD-10-CM

## 2024-05-17 PROBLEM — D68.69 SECONDARY HYPERCOAGULABLE STATE (HCC): Status: ACTIVE | Noted: 2024-05-17

## 2024-05-17 RX ORDER — FLUTICASONE PROPIONATE 50 MCG
1 SPRAY, SUSPENSION (ML) NASAL
COMMUNITY
Start: 2024-05-12 | End: 2024-06-11

## 2024-05-17 RX ORDER — BENZONATATE 100 MG/1
100 CAPSULE ORAL
COMMUNITY
Start: 2024-05-12 | End: 2024-05-17

## 2024-05-17 SDOH — ECONOMIC STABILITY: HOUSING INSECURITY
IN THE LAST 12 MONTHS, WAS THERE A TIME WHEN YOU DID NOT HAVE A STEADY PLACE TO SLEEP OR SLEPT IN A SHELTER (INCLUDING NOW)?: NO

## 2024-05-17 SDOH — ECONOMIC STABILITY: FOOD INSECURITY: WITHIN THE PAST 12 MONTHS, THE FOOD YOU BOUGHT JUST DIDN'T LAST AND YOU DIDN'T HAVE MONEY TO GET MORE.: NEVER TRUE

## 2024-05-17 SDOH — ECONOMIC STABILITY: FOOD INSECURITY: WITHIN THE PAST 12 MONTHS, YOU WORRIED THAT YOUR FOOD WOULD RUN OUT BEFORE YOU GOT MONEY TO BUY MORE.: NEVER TRUE

## 2024-05-17 SDOH — ECONOMIC STABILITY: INCOME INSECURITY: HOW HARD IS IT FOR YOU TO PAY FOR THE VERY BASICS LIKE FOOD, HOUSING, MEDICAL CARE, AND HEATING?: NOT HARD AT ALL

## 2024-05-17 ASSESSMENT — PATIENT HEALTH QUESTIONNAIRE - PHQ9
SUM OF ALL RESPONSES TO PHQ QUESTIONS 1-9: 0
3. TROUBLE FALLING OR STAYING ASLEEP: NOT AT ALL
6. FEELING BAD ABOUT YOURSELF - OR THAT YOU ARE A FAILURE OR HAVE LET YOURSELF OR YOUR FAMILY DOWN: NOT AT ALL
1. LITTLE INTEREST OR PLEASURE IN DOING THINGS: NOT AT ALL
2. FEELING DOWN, DEPRESSED OR HOPELESS: NOT AT ALL
SUM OF ALL RESPONSES TO PHQ9 QUESTIONS 1 & 2: 0
10. IF YOU CHECKED OFF ANY PROBLEMS, HOW DIFFICULT HAVE THESE PROBLEMS MADE IT FOR YOU TO DO YOUR WORK, TAKE CARE OF THINGS AT HOME, OR GET ALONG WITH OTHER PEOPLE: NOT DIFFICULT AT ALL
SUM OF ALL RESPONSES TO PHQ QUESTIONS 1-9: 0
4. FEELING TIRED OR HAVING LITTLE ENERGY: NOT AT ALL
7. TROUBLE CONCENTRATING ON THINGS, SUCH AS READING THE NEWSPAPER OR WATCHING TELEVISION: NOT AT ALL
SUM OF ALL RESPONSES TO PHQ QUESTIONS 1-9: 0
8. MOVING OR SPEAKING SO SLOWLY THAT OTHER PEOPLE COULD HAVE NOTICED. OR THE OPPOSITE, BEING SO FIGETY OR RESTLESS THAT YOU HAVE BEEN MOVING AROUND A LOT MORE THAN USUAL: NOT AT ALL
SUM OF ALL RESPONSES TO PHQ QUESTIONS 1-9: 0
5. POOR APPETITE OR OVEREATING: NOT AT ALL
9. THOUGHTS THAT YOU WOULD BE BETTER OFF DEAD, OR OF HURTING YOURSELF: NOT AT ALL

## 2024-05-17 ASSESSMENT — LIFESTYLE VARIABLES
HOW OFTEN DO YOU HAVE A DRINK CONTAINING ALCOHOL: 2-4 TIMES A MONTH
HOW MANY STANDARD DRINKS CONTAINING ALCOHOL DO YOU HAVE ON A TYPICAL DAY: 1 OR 2

## 2024-05-17 ASSESSMENT — ENCOUNTER SYMPTOMS
RESPIRATORY NEGATIVE: 1
GASTROINTESTINAL NEGATIVE: 1

## 2024-05-17 NOTE — PROGRESS NOTES
Medicare Annual Wellness Visit    Landon Goodmanmarciajorymadelainesteve is here for Medicare AWV    Assessment & Plan     Medicare annual wellness visit, subsequent    Essential hypertension  - Normotensive  - he has met JNC standards.  - Continue current regimen, amlodipine, atenolol  -     Comprehensive Metabolic Panel; Future  -     Hemoglobin A1C; Future  -     LIPID PANEL; Future  -     CBC with Auto Differential; Future  -     TSH with Reflex; Future    Mixed hyperlipidemia  - Chronic, stable  - Continue current regimen, atorvastatin  -     Hemoglobin A1C; Future  -     LIPID PANEL; Future  -     CBC with Auto Differential; Future    Paroxysmal atrial fibrillation (HCC)  - Chronic, stable  - Continue current regimen, atenolol, Xarelto  -     Comprehensive Metabolic Panel; Future  -     CBC with Auto Differential; Future    Impaired fasting glucose  - Chronic, improved  - Last A1c 5.6, down from 5.7  - Continue monitoring  -     Comprehensive Metabolic Panel; Future  -     Hemoglobin A1C; Future  -     LIPID PANEL; Future    Major depressive disorder, recurrent, moderate (HCC)  - Chronic, stable  - Continue current regimen, Effexor  -     Comprehensive Metabolic Panel; Future  -     CBC with Auto Differential; Future  -     TSH with Reflex; Future    History of stroke  -No evidence of recurrence  -Continue blood pressure control, Xarelto, atorvastatin    Prostate cancer screening  -     PSA Screening; Future      Lab Results   Component Value Date     06/06/2023    K 4.3 06/06/2023     06/06/2023    CO2 21 06/06/2023    BUN 17 06/06/2023    CREATININE 1.1 06/06/2023    GLUCOSE 105 (H) 06/06/2023    CALCIUM 9.2 06/06/2023    PROT 7.3 10/14/2011    BILITOT 0.9 06/06/2023    ALKPHOS 96 06/06/2023    AST 20 06/06/2023    ALT 20 06/06/2023    LABGLOM >60 06/06/2023    GFRAA >60 05/12/2022    AGRATIO 1.7 06/06/2023    GLOB 2.6 05/11/2021       Lab Results   Component Value Date    WBC 4.2 05/12/2022    HGB 14.3 05/12/2022  Undermining Type: Entire Wound

## 2024-05-29 DIAGNOSIS — R73.01 IMPAIRED FASTING GLUCOSE: ICD-10-CM

## 2024-05-29 DIAGNOSIS — Z12.5 PROSTATE CANCER SCREENING: ICD-10-CM

## 2024-05-29 DIAGNOSIS — I48.0 PAROXYSMAL ATRIAL FIBRILLATION (HCC): ICD-10-CM

## 2024-05-29 DIAGNOSIS — E78.2 MIXED HYPERLIPIDEMIA: ICD-10-CM

## 2024-05-29 DIAGNOSIS — I10 ESSENTIAL HYPERTENSION: ICD-10-CM

## 2024-05-29 DIAGNOSIS — F33.1 MAJOR DEPRESSIVE DISORDER, RECURRENT, MODERATE (HCC): ICD-10-CM

## 2024-05-29 LAB
ALBUMIN SERPL-MCNC: 4.3 G/DL (ref 3.4–5)
ALBUMIN/GLOB SERPL: 1.7 {RATIO} (ref 1.1–2.2)
ALP SERPL-CCNC: 99 U/L (ref 40–129)
ALT SERPL-CCNC: 20 U/L (ref 10–40)
ANION GAP SERPL CALCULATED.3IONS-SCNC: 12 MMOL/L (ref 3–16)
AST SERPL-CCNC: 18 U/L (ref 15–37)
BASOPHILS # BLD: 0 K/UL (ref 0–0.2)
BASOPHILS NFR BLD: 0.4 %
BILIRUB SERPL-MCNC: 0.5 MG/DL (ref 0–1)
BUN SERPL-MCNC: 16 MG/DL (ref 7–20)
CALCIUM SERPL-MCNC: 8.8 MG/DL (ref 8.3–10.6)
CHLORIDE SERPL-SCNC: 106 MMOL/L (ref 99–110)
CHOLEST SERPL-MCNC: 146 MG/DL (ref 0–199)
CO2 SERPL-SCNC: 24 MMOL/L (ref 21–32)
CREAT SERPL-MCNC: 1 MG/DL (ref 0.8–1.3)
DEPRECATED RDW RBC AUTO: 13.8 % (ref 12.4–15.4)
EOSINOPHIL # BLD: 0.2 K/UL (ref 0–0.6)
EOSINOPHIL NFR BLD: 3 %
GFR SERPLBLD CREATININE-BSD FMLA CKD-EPI: 79 ML/MIN/{1.73_M2}
GLUCOSE SERPL-MCNC: 105 MG/DL (ref 70–99)
HCT VFR BLD AUTO: 42.2 % (ref 40.5–52.5)
HDLC SERPL-MCNC: 42 MG/DL (ref 40–60)
HGB BLD-MCNC: 14.3 G/DL (ref 13.5–17.5)
LDLC SERPL CALC-MCNC: 48 MG/DL
LYMPHOCYTES # BLD: 1.7 K/UL (ref 1–5.1)
LYMPHOCYTES NFR BLD: 30.5 %
MCH RBC QN AUTO: 30.2 PG (ref 26–34)
MCHC RBC AUTO-ENTMCNC: 33.8 G/DL (ref 31–36)
MCV RBC AUTO: 89.4 FL (ref 80–100)
MONOCYTES # BLD: 0.4 K/UL (ref 0–1.3)
MONOCYTES NFR BLD: 7.9 %
NEUTROPHILS # BLD: 3.2 K/UL (ref 1.7–7.7)
NEUTROPHILS NFR BLD: 58.2 %
PLATELET # BLD AUTO: 227 K/UL (ref 135–450)
PMV BLD AUTO: 8.6 FL (ref 5–10.5)
POTASSIUM SERPL-SCNC: 4.2 MMOL/L (ref 3.5–5.1)
PROT SERPL-MCNC: 6.8 G/DL (ref 6.4–8.2)
PSA SERPL DL<=0.01 NG/ML-MCNC: 3.15 NG/ML (ref 0–4)
RBC # BLD AUTO: 4.72 M/UL (ref 4.2–5.9)
SODIUM SERPL-SCNC: 142 MMOL/L (ref 136–145)
TRIGL SERPL-MCNC: 281 MG/DL (ref 0–150)
TSH SERPL DL<=0.005 MIU/L-ACNC: 2.92 UIU/ML (ref 0.27–4.2)
VLDLC SERPL CALC-MCNC: 56 MG/DL
WBC # BLD AUTO: 5.5 K/UL (ref 4–11)

## 2024-05-30 LAB
EST. AVERAGE GLUCOSE BLD GHB EST-MCNC: 119.8 MG/DL
HBA1C MFR BLD: 5.8 %

## 2024-06-24 RX ORDER — ATENOLOL 50 MG/1
50 TABLET ORAL DAILY
Qty: 90 TABLET | Refills: 1 | Status: SHIPPED | OUTPATIENT
Start: 2024-06-24

## 2024-07-15 RX ORDER — VENLAFAXINE 75 MG/1
TABLET ORAL
Qty: 45 TABLET | Refills: 1 | Status: SHIPPED | OUTPATIENT
Start: 2024-07-15

## 2024-08-26 RX ORDER — ALLOPURINOL 100 MG/1
100 TABLET ORAL DAILY
Qty: 90 TABLET | Refills: 2 | Status: SHIPPED | OUTPATIENT
Start: 2024-08-26

## 2024-08-26 NOTE — TELEPHONE ENCOUNTER
Last OV: 5/17/2024  Next OV: 11/18/2024    Next appointment due:(around 11/17/2024)     Last fill:11/30/23  Refills:2#90

## 2024-09-25 RX ORDER — ATORVASTATIN CALCIUM 40 MG/1
40 TABLET, FILM COATED ORAL DAILY
Qty: 90 TABLET | Refills: 1 | Status: SHIPPED | OUTPATIENT
Start: 2024-09-25

## 2024-10-29 DIAGNOSIS — I10 ESSENTIAL HYPERTENSION: ICD-10-CM

## 2024-10-29 RX ORDER — AMLODIPINE BESYLATE 10 MG/1
10 TABLET ORAL DAILY
Qty: 90 TABLET | Refills: 1 | Status: SHIPPED | OUTPATIENT
Start: 2024-10-29

## 2024-10-29 NOTE — TELEPHONE ENCOUNTER
Requested Prescriptions     Pending Prescriptions Disp Refills    amLODIPine (NORVASC) 10 MG tablet [Pharmacy Med Name: amLODIPine BESYLATE 10MG TAB] 90 tablet 1     Sig: TAKE 1 TABLET BY MOUTH DAILY        Last Filled:  05/03/24    Patient Phone Number: [phone] (home)     Last appt: 5/17/2024   Next appt: 11/18/2024    Last OARRS:        No data to display

## 2024-11-18 ENCOUNTER — OFFICE VISIT (OUTPATIENT)
Dept: INTERNAL MEDICINE CLINIC | Age: 74
End: 2024-11-18
Payer: MEDICARE

## 2024-11-18 VITALS
HEART RATE: 53 BPM | OXYGEN SATURATION: 97 % | HEIGHT: 69 IN | SYSTOLIC BLOOD PRESSURE: 122 MMHG | WEIGHT: 192.4 LBS | DIASTOLIC BLOOD PRESSURE: 82 MMHG | BODY MASS INDEX: 28.5 KG/M2

## 2024-11-18 DIAGNOSIS — F33.1 MAJOR DEPRESSIVE DISORDER, RECURRENT, MODERATE (HCC): ICD-10-CM

## 2024-11-18 DIAGNOSIS — E78.2 MIXED HYPERLIPIDEMIA: ICD-10-CM

## 2024-11-18 DIAGNOSIS — I10 ESSENTIAL HYPERTENSION: Primary | ICD-10-CM

## 2024-11-18 DIAGNOSIS — I48.0 PAROXYSMAL ATRIAL FIBRILLATION (HCC): ICD-10-CM

## 2024-11-18 DIAGNOSIS — R73.01 IMPAIRED FASTING GLUCOSE: ICD-10-CM

## 2024-11-18 DIAGNOSIS — Z23 NEEDS FLU SHOT: ICD-10-CM

## 2024-11-18 PROBLEM — D68.69 SECONDARY HYPERCOAGULABLE STATE (HCC): Status: RESOLVED | Noted: 2024-05-17 | Resolved: 2024-11-18

## 2024-11-18 PROCEDURE — G0008 ADMIN INFLUENZA VIRUS VAC: HCPCS | Performed by: NURSE PRACTITIONER

## 2024-11-18 PROCEDURE — 3074F SYST BP LT 130 MM HG: CPT | Performed by: NURSE PRACTITIONER

## 2024-11-18 PROCEDURE — 1123F ACP DISCUSS/DSCN MKR DOCD: CPT | Performed by: NURSE PRACTITIONER

## 2024-11-18 PROCEDURE — 99214 OFFICE O/P EST MOD 30 MIN: CPT | Performed by: NURSE PRACTITIONER

## 2024-11-18 PROCEDURE — 90653 IIV ADJUVANT VACCINE IM: CPT | Performed by: NURSE PRACTITIONER

## 2024-11-18 PROCEDURE — 3079F DIAST BP 80-89 MM HG: CPT | Performed by: NURSE PRACTITIONER

## 2024-11-18 PROCEDURE — 1159F MED LIST DOCD IN RCRD: CPT | Performed by: NURSE PRACTITIONER

## 2024-11-18 ASSESSMENT — ENCOUNTER SYMPTOMS
GASTROINTESTINAL NEGATIVE: 1
RESPIRATORY NEGATIVE: 1

## 2024-11-18 NOTE — PROGRESS NOTES
SUBJECTIVE:    Patient ID: Landon Kang is a 74 y.o. male.    CC: Hypertension, hyperlipidemia, paroxysmal atrial fibrillation, prediabetes, depression    HPI: The patient presents to the office today for routine follow-up of chronic medical conditions.    He has no new specific acute complaint today.    Patient has a history of hypertension, hyperlipidemia, and paroxysmal atrial fibrillation.  He denies any chest pain or shortness of breath.  He is compliant with his medication regimen.    Patient has a history of prediabetes.  This remains stable with most recent A1c 5.8.    Patient has a history of depression.  He is taking Effexor.    He is amenable to an influenza vaccination.  Recommend shingles and RSV vaccine at his pharmacy.      Past Medical History:   Diagnosis Date    Atrial fibrillation (HCC)     Cerebral artery occlusion with cerebral infarction (HCC)     Colonic mass     benign    Hyperlipidemia     Hypertension     Sleep apnea     uses c-pap        Current Outpatient Medications   Medication Sig Dispense Refill    amLODIPine (NORVASC) 10 MG tablet TAKE 1 TABLET BY MOUTH DAILY 90 tablet 1    atorvastatin (LIPITOR) 40 MG tablet TAKE 1 TABLET BY MOUTH DAILY 90 tablet 1    allopurinol (ZYLOPRIM) 100 MG tablet TAKE 1 TABLET BY MOUTH DAILY 90 tablet 2    venlafaxine (EFFEXOR) 75 MG tablet TAKE 1/2 TABLET BY MOUTH DAILY 45 tablet 1    atenolol (TENORMIN) 50 MG tablet TAKE 1 TABLET BY MOUTH DAILY 90 tablet 1    rivaroxaban (XARELTO) 20 MG TABS tablet TAKE ONE TABLET BY MOUTH DAILY WITH BREAKFAST 90 tablet 5    fluticasone (FLONASE ALLERGY RELIEF) 50 MCG/ACT nasal spray 1 spray by Each Nostril route Every Day       No current facility-administered medications for this visit.           Review of Systems   Constitutional: Negative.    Respiratory: Negative.     Cardiovascular: Negative.    Gastrointestinal: Negative.    Genitourinary: Negative.    Musculoskeletal: Negative.    Neurological: Negative.

## 2024-12-05 ENCOUNTER — TELEPHONE (OUTPATIENT)
Dept: CARDIOLOGY CLINIC | Age: 74
End: 2024-12-05

## 2024-12-05 NOTE — TELEPHONE ENCOUNTER
Pt states he is having possible dental work next week for a cracked molar.    Pt is asking if he should stop Xarelto, and if so when.    Please advise.

## 2024-12-18 RX ORDER — ATENOLOL 50 MG/1
50 TABLET ORAL DAILY
Qty: 90 TABLET | Refills: 1 | Status: SHIPPED | OUTPATIENT
Start: 2024-12-18

## 2024-12-27 PROBLEM — L30.9 DERMATITIS: Status: RESOLVED | Noted: 2023-07-18 | Resolved: 2024-12-27

## 2024-12-27 NOTE — PROGRESS NOTES
Mild; asymptomatic   - Will reduce BB dose if he develops any symptoms    3. HTN  - Stable  ~ Goal <130/80  - Continue current medications  - Encouraged to monitor and log BP readings at home  - Discussed importance of low sodium diet, weight control and exercise    4.Hyperlipidemia  - Controlled  ~ Goal: LDL <70   ~ LDL 48 (5/24)  - Continue atorvastatin 40 mg QD  - Discussed diet, weight loss, and exercise needs    5. Valvular Insufficiency   - Noted on echo. Asymptomatic   - Surveillance echo every couple years, sooner if symptoms develop    6. CVA   - Continue Xarelto, statin    Plan:  1.Continue current medications  2. Call if any questions    F/U: Follow-up with EP in 1 year  -Call [facility] at [phone] with any questions    Diet & Exercise:  The patient is counseled to follow a low salt diet to assure blood pressure remains controlled for cardiovascular risk factor modification  The patient is counseled to avoid excess caffeine, and energy drinks as this may exacerbated ectopy and arrhythmia  The patient is counseled to lose weight to control cardiovascular risk factors  Exercise program discussed: To improve overall cardiovascular health, the patient is instructed to increase cardiovascular related activities with a goal of 150 min/week of moderate level activity or 10,000 steps per day. Encouraged to perform as much activity as tolerated    I have addressed the patient's cardiac risk factors and adjusted pharmacologic treatment as needed. In addition, I have reinforced the need for patient directed risk factor modification.    I independently reviewed the ECG    All questions and concerns were addressed with the patient. Alternatives to treatment were discussed.     Thank you for allowing to us to participate in the care of AMANDO [facility]   Office: [phone]

## 2025-01-14 RX ORDER — VENLAFAXINE 75 MG/1
TABLET ORAL
Qty: 45 TABLET | Refills: 1 | Status: SHIPPED | OUTPATIENT
Start: 2025-01-14

## 2025-01-24 ENCOUNTER — OFFICE VISIT (OUTPATIENT)
Dept: CARDIOLOGY CLINIC | Age: 75
End: 2025-01-24
Payer: MEDICARE

## 2025-01-24 VITALS
OXYGEN SATURATION: 99 % | DIASTOLIC BLOOD PRESSURE: 62 MMHG | SYSTOLIC BLOOD PRESSURE: 126 MMHG | BODY MASS INDEX: 28.75 KG/M2 | HEIGHT: 69 IN | HEART RATE: 58 BPM | WEIGHT: 194.1 LBS

## 2025-01-24 DIAGNOSIS — I63.431 CEREBRAL INFARCTION DUE TO EMBOLISM OF RIGHT POSTERIOR CEREBRAL ARTERY (HCC): ICD-10-CM

## 2025-01-24 DIAGNOSIS — I10 ESSENTIAL HYPERTENSION: ICD-10-CM

## 2025-01-24 DIAGNOSIS — I48.0 PAROXYSMAL ATRIAL FIBRILLATION (HCC): Primary | ICD-10-CM

## 2025-01-24 DIAGNOSIS — G47.33 OSA (OBSTRUCTIVE SLEEP APNEA): ICD-10-CM

## 2025-01-24 DIAGNOSIS — I49.5 TACHY-BRADY SYNDROME (HCC): ICD-10-CM

## 2025-01-24 PROCEDURE — 3078F DIAST BP <80 MM HG: CPT | Performed by: NURSE PRACTITIONER

## 2025-01-24 PROCEDURE — 99214 OFFICE O/P EST MOD 30 MIN: CPT | Performed by: NURSE PRACTITIONER

## 2025-01-24 PROCEDURE — 1123F ACP DISCUSS/DSCN MKR DOCD: CPT | Performed by: NURSE PRACTITIONER

## 2025-01-24 PROCEDURE — 93000 ELECTROCARDIOGRAM COMPLETE: CPT | Performed by: NURSE PRACTITIONER

## 2025-01-24 PROCEDURE — 3074F SYST BP LT 130 MM HG: CPT | Performed by: NURSE PRACTITIONER

## 2025-01-24 PROCEDURE — 1159F MED LIST DOCD IN RCRD: CPT | Performed by: NURSE PRACTITIONER

## 2025-03-24 RX ORDER — ATORVASTATIN CALCIUM 40 MG/1
40 TABLET, FILM COATED ORAL DAILY
Qty: 90 TABLET | Refills: 1 | Status: SHIPPED | OUTPATIENT
Start: 2025-03-24

## 2025-03-24 NOTE — TELEPHONE ENCOUNTER
Okay to fill.     Medication:   Requested Prescriptions     Pending Prescriptions Disp Refills    atorvastatin (LIPITOR) 40 MG tablet [Pharmacy Med Name: ATORVASTATIN 40 MG TABLET] 90 tablet 1     Sig: TAKE 1 TABLET BY MOUTH DAILY        Last Filled:  09/25/24    Patient Phone Number: [phone] (home)     Last appt: 11/18/2024   Next appt: 5/27/2025    Last OARRS:        No data to display

## 2025-04-25 DIAGNOSIS — I10 ESSENTIAL HYPERTENSION: ICD-10-CM

## 2025-04-25 RX ORDER — AMLODIPINE BESYLATE 10 MG/1
10 TABLET ORAL DAILY
Qty: 90 TABLET | Refills: 1 | Status: SHIPPED | OUTPATIENT
Start: 2025-04-25

## 2025-05-05 DIAGNOSIS — I48.0 PAROXYSMAL ATRIAL FIBRILLATION (HCC): ICD-10-CM

## 2025-05-05 RX ORDER — RIVAROXABAN 20 MG/1
20 TABLET, FILM COATED ORAL DAILY
Qty: 90 TABLET | Refills: 3 | Status: SHIPPED | OUTPATIENT
Start: 2025-05-05

## 2025-05-05 NOTE — TELEPHONE ENCOUNTER
Received refill request for Xarelto from [facility].     Last OV: 1/24/2025 NPSR     Next OV:  Recall List.     Last Labs: 5/29/2024 CBC

## 2025-05-20 RX ORDER — ALLOPURINOL 100 MG/1
100 TABLET ORAL DAILY
Qty: 90 TABLET | Refills: 2 | Status: SHIPPED | OUTPATIENT
Start: 2025-05-20

## 2025-05-20 NOTE — TELEPHONE ENCOUNTER
Okay to fill.     Medication:   Requested Prescriptions     Pending Prescriptions Disp Refills    allopurinol (ZYLOPRIM) 100 MG tablet [Pharmacy Med Name: ALLOPURINOL 100 MG TABLET] 90 tablet 2     Sig: TAKE 1 TABLET BY MOUTH DAILY        Last Filled:  08/26/24    Patient Phone Number: [phone] (home)     Last appt: 11/18/2024   Next appt: 5/27/2025    Last OARRS:        No data to display

## 2025-05-27 ENCOUNTER — OFFICE VISIT (OUTPATIENT)
Dept: INTERNAL MEDICINE CLINIC | Age: 75
End: 2025-05-27

## 2025-05-27 VITALS
HEIGHT: 69 IN | DIASTOLIC BLOOD PRESSURE: 78 MMHG | BODY MASS INDEX: 28.97 KG/M2 | WEIGHT: 195.6 LBS | OXYGEN SATURATION: 99 % | SYSTOLIC BLOOD PRESSURE: 120 MMHG | HEART RATE: 50 BPM

## 2025-05-27 DIAGNOSIS — Z00.00 MEDICARE ANNUAL WELLNESS VISIT, SUBSEQUENT: Primary | ICD-10-CM

## 2025-05-27 DIAGNOSIS — Z12.5 PROSTATE CANCER SCREENING: ICD-10-CM

## 2025-05-27 DIAGNOSIS — I48.0 PAROXYSMAL ATRIAL FIBRILLATION (HCC): ICD-10-CM

## 2025-05-27 DIAGNOSIS — R73.01 IMPAIRED FASTING GLUCOSE: ICD-10-CM

## 2025-05-27 DIAGNOSIS — F33.1 MAJOR DEPRESSIVE DISORDER, RECURRENT, MODERATE (HCC): ICD-10-CM

## 2025-05-27 DIAGNOSIS — M15.0 PRIMARY OSTEOARTHRITIS INVOLVING MULTIPLE JOINTS: ICD-10-CM

## 2025-05-27 DIAGNOSIS — E78.2 MIXED HYPERLIPIDEMIA: ICD-10-CM

## 2025-05-27 DIAGNOSIS — I10 ESSENTIAL HYPERTENSION: ICD-10-CM

## 2025-05-27 SDOH — ECONOMIC STABILITY: FOOD INSECURITY: WITHIN THE PAST 12 MONTHS, YOU WORRIED THAT YOUR FOOD WOULD RUN OUT BEFORE YOU GOT MONEY TO BUY MORE.: NEVER TRUE

## 2025-05-27 SDOH — ECONOMIC STABILITY: FOOD INSECURITY: WITHIN THE PAST 12 MONTHS, THE FOOD YOU BOUGHT JUST DIDN'T LAST AND YOU DIDN'T HAVE MONEY TO GET MORE.: NEVER TRUE

## 2025-05-27 ASSESSMENT — LIFESTYLE VARIABLES
HOW MANY STANDARD DRINKS CONTAINING ALCOHOL DO YOU HAVE ON A TYPICAL DAY: 1 OR 2
HOW OFTEN DURING THE LAST YEAR HAVE YOU NEEDED AN ALCOHOLIC DRINK FIRST THING IN THE MORNING TO GET YOURSELF GOING AFTER A NIGHT OF HEAVY DRINKING: NEVER
HAVE YOU OR SOMEONE ELSE BEEN INJURED AS A RESULT OF YOUR DRINKING: NO
HOW OFTEN DO YOU HAVE A DRINK CONTAINING ALCOHOL: 4 OR MORE TIMES A WEEK
HAS A RELATIVE, FRIEND, DOCTOR, OR ANOTHER HEALTH PROFESSIONAL EXPRESSED CONCERN ABOUT YOUR DRINKING OR SUGGESTED YOU CUT DOWN: NO
HOW OFTEN DURING THE LAST YEAR HAVE YOU FAILED TO DO WHAT WAS NORMALLY EXPECTED FROM YOU BECAUSE OF DRINKING: NEVER
HOW OFTEN DURING THE LAST YEAR HAVE YOU HAD A FEELING OF GUILT OR REMORSE AFTER DRINKING: NEVER
HOW OFTEN DURING THE LAST YEAR HAVE YOU BEEN UNABLE TO REMEMBER WHAT HAPPENED THE NIGHT BEFORE BECAUSE YOU HAD BEEN DRINKING: NEVER
HOW OFTEN DURING THE LAST YEAR HAVE YOU FOUND THAT YOU WERE NOT ABLE TO STOP DRINKING ONCE YOU HAD STARTED: NEVER

## 2025-05-27 ASSESSMENT — ENCOUNTER SYMPTOMS
RESPIRATORY NEGATIVE: 1
GASTROINTESTINAL NEGATIVE: 1

## 2025-05-27 ASSESSMENT — PATIENT HEALTH QUESTIONNAIRE - PHQ9
4. FEELING TIRED OR HAVING LITTLE ENERGY: NOT AT ALL
8. MOVING OR SPEAKING SO SLOWLY THAT OTHER PEOPLE COULD HAVE NOTICED. OR THE OPPOSITE, BEING SO FIGETY OR RESTLESS THAT YOU HAVE BEEN MOVING AROUND A LOT MORE THAN USUAL: NOT AT ALL
6. FEELING BAD ABOUT YOURSELF - OR THAT YOU ARE A FAILURE OR HAVE LET YOURSELF OR YOUR FAMILY DOWN: NOT AT ALL
9. THOUGHTS THAT YOU WOULD BE BETTER OFF DEAD, OR OF HURTING YOURSELF: NOT AT ALL
SUM OF ALL RESPONSES TO PHQ QUESTIONS 1-9: 0
2. FEELING DOWN, DEPRESSED OR HOPELESS: NOT AT ALL
7. TROUBLE CONCENTRATING ON THINGS, SUCH AS READING THE NEWSPAPER OR WATCHING TELEVISION: NOT AT ALL
5. POOR APPETITE OR OVEREATING: NOT AT ALL
1. LITTLE INTEREST OR PLEASURE IN DOING THINGS: NOT AT ALL
10. IF YOU CHECKED OFF ANY PROBLEMS, HOW DIFFICULT HAVE THESE PROBLEMS MADE IT FOR YOU TO DO YOUR WORK, TAKE CARE OF THINGS AT HOME, OR GET ALONG WITH OTHER PEOPLE: NOT DIFFICULT AT ALL
SUM OF ALL RESPONSES TO PHQ QUESTIONS 1-9: 0
SUM OF ALL RESPONSES TO PHQ QUESTIONS 1-9: 0
3. TROUBLE FALLING OR STAYING ASLEEP: NOT AT ALL
SUM OF ALL RESPONSES TO PHQ QUESTIONS 1-9: 0

## 2025-05-27 NOTE — PROGRESS NOTES
Risk Factor Screenings with Interventions:                   Vision Screen:  Do you have difficulty driving, watching TV, or doing any of your daily activities because of your eyesight?: No  Have you had an eye exam within the past year?: (!) No  Interventions:   See AVS for additional education material                    Objective   Vitals:    05/27/25 0719   BP: 120/78   Pulse: 50   SpO2: 99%   Weight: 88.7 kg (195 lb 9.6 oz)   Height: 1.753 m (5' 9\")      Body mass index is 28.89 kg/m².        Gen: NAD  Eyes: no icterus, no conjunctival erythema  CV: RRR, no mrgs  Resp: CTAB  Abd: soft, NTTP  Neuro: alert, oriented, answers questions appropriately  MSK: no peripheral edema  Skin: warm, dry  Psych: Normal mood, affect              Allergies   Allergen Reactions    Lisinopril Swelling     Prior to Visit Medications    Medication Sig Taking? Authorizing Provider   allopurinol (ZYLOPRIM) 100 MG tablet TAKE 1 TABLET BY MOUTH DAILY Yes Todd Sánchez APRN - CNP   rivaroxaban (XARELTO) 20 MG TABS tablet TAKE 1 TABLET BY MOUTH DAILY WITH BREAKFAST Yes Chi Hillman PA-C   amLODIPine (NORVASC) 10 MG tablet TAKE 1 TABLET BY MOUTH DAILY Yes Todd Sánchez APRN - CNP   atorvastatin (LIPITOR) 40 MG tablet TAKE 1 TABLET BY MOUTH DAILY Yes Todd Sánchez APRN - CNP   venlafaxine (EFFEXOR) 75 MG tablet TAKE 1/2 TABLET BY MOUTH DAILY Yes Todd Sánchez APRN - CNP   atenolol (TENORMIN) 50 MG tablet TAKE 1 TABLET BY MOUTH DAILY Yes Todd Sánchez APRN - CNP   lisinopril (PRINIVIL;ZESTRIL) 40 MG tablet Take 1 tablet by mouth daily  Todd Sánchez APRN - CNP       CareTeam (Including outside providers/suppliers regularly involved in providing care):   Patient Care Team:  Todd Sánchez APRN - CNP as PCP - General (Family Nurse Practitioner)  Todd Sánchez APRN - CNP as PCP - Empaneled Provider  Darwin Fowler MD as Surgeon (General Surgery)  Bran Galicia MD as Consulting

## 2025-06-05 DIAGNOSIS — I10 ESSENTIAL HYPERTENSION: ICD-10-CM

## 2025-06-05 DIAGNOSIS — R73.01 IMPAIRED FASTING GLUCOSE: ICD-10-CM

## 2025-06-05 DIAGNOSIS — F33.1 MAJOR DEPRESSIVE DISORDER, RECURRENT, MODERATE (HCC): ICD-10-CM

## 2025-06-05 DIAGNOSIS — E78.2 MIXED HYPERLIPIDEMIA: ICD-10-CM

## 2025-06-05 DIAGNOSIS — I48.0 PAROXYSMAL ATRIAL FIBRILLATION (HCC): ICD-10-CM

## 2025-06-05 DIAGNOSIS — Z12.5 PROSTATE CANCER SCREENING: ICD-10-CM

## 2025-06-06 ENCOUNTER — RESULTS FOLLOW-UP (OUTPATIENT)
Dept: INTERNAL MEDICINE CLINIC | Age: 75
End: 2025-06-06

## 2025-06-06 LAB
ALBUMIN SERPL-MCNC: 4.4 G/DL (ref 3.4–5)
ALBUMIN/GLOB SERPL: 1.8 {RATIO} (ref 1.1–2.2)
ALP SERPL-CCNC: 91 U/L (ref 40–129)
ALT SERPL-CCNC: 28 U/L (ref 10–40)
ANION GAP SERPL CALCULATED.3IONS-SCNC: 13 MMOL/L (ref 3–16)
AST SERPL-CCNC: 26 U/L (ref 15–37)
BASOPHILS # BLD: 0 K/UL (ref 0–0.2)
BASOPHILS NFR BLD: 0.6 %
BILIRUB SERPL-MCNC: 1 MG/DL (ref 0–1)
BUN SERPL-MCNC: 15 MG/DL (ref 7–20)
CALCIUM SERPL-MCNC: 9.2 MG/DL (ref 8.3–10.6)
CHLORIDE SERPL-SCNC: 103 MMOL/L (ref 99–110)
CHOLEST SERPL-MCNC: 147 MG/DL (ref 0–199)
CO2 SERPL-SCNC: 24 MMOL/L (ref 21–32)
CREAT SERPL-MCNC: 1.2 MG/DL (ref 0.8–1.3)
DEPRECATED RDW RBC AUTO: 13.9 % (ref 12.4–15.4)
EOSINOPHIL # BLD: 0.3 K/UL (ref 0–0.6)
EOSINOPHIL NFR BLD: 5.7 %
EST. AVERAGE GLUCOSE BLD GHB EST-MCNC: 116.9 MG/DL
GFR SERPLBLD CREATININE-BSD FMLA CKD-EPI: 63 ML/MIN/{1.73_M2}
GLUCOSE SERPL-MCNC: 100 MG/DL (ref 70–99)
HBA1C MFR BLD: 5.7 %
HCT VFR BLD AUTO: 45.1 % (ref 40.5–52.5)
HDLC SERPL-MCNC: 42 MG/DL (ref 40–60)
HGB BLD-MCNC: 15.3 G/DL (ref 13.5–17.5)
LDLC SERPL CALC-MCNC: ABNORMAL MG/DL
LDLC SERPL-MCNC: 63 MG/DL
LYMPHOCYTES # BLD: 1.9 K/UL (ref 1–5.1)
LYMPHOCYTES NFR BLD: 37.4 %
MCH RBC QN AUTO: 30.8 PG (ref 26–34)
MCHC RBC AUTO-ENTMCNC: 33.8 G/DL (ref 31–36)
MCV RBC AUTO: 91 FL (ref 80–100)
MONOCYTES # BLD: 0.4 K/UL (ref 0–1.3)
MONOCYTES NFR BLD: 8.2 %
NEUTROPHILS # BLD: 2.4 K/UL (ref 1.7–7.7)
NEUTROPHILS NFR BLD: 48.1 %
PLATELET # BLD AUTO: 185 K/UL (ref 135–450)
PMV BLD AUTO: 9.7 FL (ref 5–10.5)
POTASSIUM SERPL-SCNC: 4 MMOL/L (ref 3.5–5.1)
PROT SERPL-MCNC: 6.8 G/DL (ref 6.4–8.2)
PSA SERPL DL<=0.01 NG/ML-MCNC: 2.5 NG/ML (ref 0–4)
RBC # BLD AUTO: 4.96 M/UL (ref 4.2–5.9)
SODIUM SERPL-SCNC: 140 MMOL/L (ref 136–145)
TRIGL SERPL-MCNC: 304 MG/DL (ref 0–150)
VLDLC SERPL CALC-MCNC: ABNORMAL MG/DL
WBC # BLD AUTO: 5.1 K/UL (ref 4–11)

## 2025-06-16 RX ORDER — ATENOLOL 50 MG/1
50 TABLET ORAL DAILY
Qty: 90 TABLET | Refills: 1 | Status: SHIPPED | OUTPATIENT
Start: 2025-06-16

## 2025-07-14 RX ORDER — VENLAFAXINE 75 MG/1
37.5 TABLET ORAL DAILY
Qty: 45 TABLET | Refills: 3 | Status: SHIPPED | OUTPATIENT
Start: 2025-07-14

## (undated) DEVICE — ENDOSCOPIC KIT 6X3/16 FT COLON W/ 1.1 OZ 2 GWN W/O BRSH

## (undated) DEVICE — SOLUTION IV IRRIG WATER 500ML POUR BRL ST 2F7113

## (undated) DEVICE — VALVE SUCTION AIR H2O SET ORCA POD + DISP

## (undated) DEVICE — BW-412T DISP COMBO CLEANING BRUSH: Brand: SINGLE USE COMBINATION CLEANING BRUSH

## (undated) DEVICE — TRAP SPEC RETRV CLR PLAS POLYP IN LN SUCT QUIK CTCH

## (undated) DEVICE — SNARES COLD OVAL 10MM THIN

## (undated) DEVICE — AIR/WATER CLEANING ADAPTER FOR OLYMPUS® GI ENDOSCOPE: Brand: BULLDOG®